# Patient Record
Sex: FEMALE | Race: WHITE | Employment: UNEMPLOYED | URBAN - METROPOLITAN AREA
[De-identification: names, ages, dates, MRNs, and addresses within clinical notes are randomized per-mention and may not be internally consistent; named-entity substitution may affect disease eponyms.]

---

## 2017-01-03 ENCOUNTER — HOSPITAL ENCOUNTER (OUTPATIENT)
Dept: PERINATAL CARE | Age: 22
Discharge: HOME | End: 2017-01-03
Attending: OBSTETRICS & GYNECOLOGY
Payer: MEDICAID

## 2017-01-03 PROCEDURE — 76816 OB US FOLLOW-UP PER FETUS: CPT | Performed by: OBSTETRICS & GYNECOLOGY

## 2017-01-10 ENCOUNTER — OFFICE VISIT (OUTPATIENT)
Dept: ENDOCRINOLOGY | Age: 22
End: 2017-01-10

## 2017-01-10 VITALS
OXYGEN SATURATION: 99 % | SYSTOLIC BLOOD PRESSURE: 104 MMHG | BODY MASS INDEX: 41.82 KG/M2 | WEIGHT: 213 LBS | TEMPERATURE: 97 F | HEIGHT: 60 IN | RESPIRATION RATE: 16 BRPM | DIASTOLIC BLOOD PRESSURE: 59 MMHG | HEART RATE: 108 BPM

## 2017-01-10 DIAGNOSIS — E10.65 HYPERGLYCEMIA DUE TO TYPE 1 DIABETES MELLITUS (HCC): ICD-10-CM

## 2017-01-10 DIAGNOSIS — Z79.4 ENCOUNTER FOR LONG-TERM (CURRENT) USE OF INSULIN (HCC): ICD-10-CM

## 2017-01-10 DIAGNOSIS — Z46.81 INSULIN PUMP FITTING OR ADJUSTMENT: Primary | ICD-10-CM

## 2017-01-10 DIAGNOSIS — O24.012 PRE-EXISTING TYPE 1 DIABETES MELLITUS DURING PREGNANCY IN SECOND TRIMESTER: ICD-10-CM

## 2017-01-10 NOTE — PROGRESS NOTES
[facility]               Anastacia Ponce MD        [address] 78 444 81 66 Fax [phone]           Patient Information  Date:1/10/2017  Name : Lyle Esteban 24 y.o.     YOB: 1995         Referred by: None     OB Dr Yoselyn LozanoAr[place], [place] [phone]    Chief Complaint   Patient presents with    Gestational Diabetes       History of Present Illness: Lyle Esteban is a 24 y.o. female here for follow up       Reviewed the pump download and CGM download  She reports hypoglycemia and is eating more. I reviewed the pump download as well as CGM data. She had once in the last 10 days and then in December - both of which she said she did not have food at home and did not eat much. Her fasting sugars are at reasonable goal but during the daytime blood glucose is increasing. Diet not healthy  - cannot afford healthy food as she has to feed her brother and boyfriend , brother is autistic - working with  now    She did meet with dietician     She also has DEXCOM (continues glucose monitor). She was diagnosed with type 1 diabetes mellitus at age 10. She was seen by Dr. Kyara Vickers, pediatric endocrinologist . Suki Baron took her baby  and she is here with Diabetic Service Dog. She has seen several endocrinologists . 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 Before she had the Diabetic Service Dog, she had multiple hospitalizations.               Wt Readings from Last 3 Encounters:   01/10/17 213 lb (96.6 kg)   12/05/16 204 lb (92.5 kg)   11/04/16 198 lb (89.8 kg)       BP Readings from Last 3 Encounters:   01/10/17 104/59   12/05/16 104/66   11/04/16 101/58           Past Medical History   Diagnosis Date    Asperger's syndrome     Asthma     B[facility]     Depression     Diabetes ([facility]     E-coli UTI     Endocrine disease      diabetes    FHx: allergies  Manic depression ([facility]     Vision decreased      Current Outpatient Prescriptions   Medication Sig    GLUCAGON EMERGENCY KIT, HUMAN, 1 mg injection INJECT 1 ML INTRAMUSCULARLY ONCE FOR 1 DOSE.  CHILD ASPIRIN 81 mg chewable tablet CHEW ONE TABLET BY MOUTH EVERY DAY FOR 30 DAYS    PROMETHAZINE HCL (PHENERGAN PO) Take  by mouth as needed.  folic acid (FOLVITE) 1 mg tablet Take  by mouth four (4) times daily.  glucose blood VI test strips (CONTOUR NEXT STRIPS) strip Test blood glucose 6x daily Dx Code: O24.414    Lancets misc Test blood glucose 6x daily Dx Code: O24.414    insulin aspart (NOVOLOG) 100 unit/mL injection Use with insulin pump Max units daily: 100    busPIRone (BUSPAR) 10 mg tablet Take 10 mg by mouth two (2) times a day.  Blood-Glucose Meter (CONTOUR NEXT METER) misc Test blood glucose 4x daily    insulin syringe-needle U-100 (BD INSULIN SYRINGE ULTRA-FINE) 1/2 mL 31 x 15/64\" syrg 1 Syringe by SubCUTAneous route Before breakfast, lunch, dinner and at bedtime.  omeprazole (PRILOSEC) 20 mg capsule Take 10 mg by mouth daily. Not sure of dose    loratadine (CLARITIN) 10 mg tablet Take 10 mg by mouth daily.  albuterol (PROAIR HFA) 90 mcg/Actuation inhaler Take 1 Puff by inhalation every four (4) hours as needed.  THALIA 250 mg/mL (1 mL) oil every seven (7) days.  NOVOLOG 100 unit/mL injection INJECT 8-15 UNITS BEFORE EACH MEAL W/ SSI    traZODone (DESYREL) 50 mg tablet Take 25 mg by mouth as needed for Sleep.  insulin glargine (LANTUS) 100 unit/mL injection Inject 30 units in AM    diclofenac potassium (CATAFLAM) 50 mg tablet Take 50 mg by mouth two (2) times a day.  methocarbamol (ROBAXIN) 500 mg tablet Take 500 mg by mouth every six (6) hours as needed.  MEDROXYPROGESTERONE ACETATE (DEPO-PROVERA IM) by IntraMUSCular route.  tizanidine (ZANAFLEX) 2 mg tablet Take 1 tablet by mouth three (3) times daily as needed.     sertraline (ZOLOFT) 100 mg tablet Take 1 Tab by mouth daily. Indications: DEPRESSION    pseudoephedrine (SUDAFED) 30 mg tablet Take 60 mg by mouth every morning.  DESOGESTREL-ETHINYL ESTRADIOL (APRI PO) Take  by mouth. No current facility-administered medications for this visit. Allergies   Allergen Reactions    Latex Rash    Other Food Itching     Califlower    Banana Itching and Nausea and Vomiting    Benadryl [Diphenhydramine Hcl] Rash    Children's Tylenol [Acetaminophen] Rash    Honey Itching     Itchy tounge    Kiwi Itching and Nausea and Vomiting    Lactose Nausea and Vomiting     Only milk is the issue    Peach (Prunus Persica) Itching and Nausea and Vomiting     Allergic reactions to raw peaches    Pineapple Itching and Nausea and Vomiting    Strawberry Itching and Nausea and Vomiting     Allergies to raw strawberries         Review of Systems:  -   - Eyes: no blurry vision no double vision  - Cardiovascular: no chest pain ,no palpitations  - Respiratory: no cough no shortness of breath  - Gastrointestinal: no dysphagia no  abdominal pain  - Musculoskeletal: no joint pains no  weakness  - Integumentary: no rashes  -   -     Physical Examination:   Blood pressure 104/59, pulse (!) 108, temperature 97 °F (36.1 °C), temperature source Oral, resp. rate 16, height 5' (1.524 m), weight 213 lb (96.6 kg), SpO2 99 %. Estimated body mass index is 41.6 kg/(m^2) as calculated from the following:    Height as of this encounter: 5' (1.524 m). -   Weight as of this encounter: 213 lb (96.6 kg).   - General: pleasant, no distress, good eye contact  - HEENT: no pallor, no periorbital edema, EOMI  - Neck: supple, no thyromegaly,   - Cardiovascular: regular, normal rate, normal S1 and S2  - Respiratory: clear to auscultation bilaterally  - Gastrointestinal: soft, gravid abdomen  - Musculoskeletal:  no edema  - Neurological: alert and oriented  - Psychiatric: normal mood and affect  - Skin: color, texture, turgor normal.       Data Reviewed: [] Glucose records reviewed. [] See glucose records for details (to be scanned). [] A1C  [] Reviewed labs    Lab Results   Component Value Date/Time    Hemoglobin A1c 9.1 05/03/2016 03:52 PM    Hemoglobin A1c CANCELED 01/06/2016 12:06 PM    Hemoglobin A1c 8.8 07/27/2015 12:00 AM    Glucose 160 05/03/2016 03:52 PM    Glucose 290 12/15/2013 06:00 AM    Glucose (POC) 215 01/05/2014 07:30 PM    Glucose  12/05/2016 03:10 PM    Microalb/Creat ratio (ug/mg creat.) 2.9 05/03/2016 03:52 PM    LDL,Direct 129 01/05/2015 02:18 PM    LDL, calculated 81 05/03/2016 03:52 PM    Creatinine (POC) 0.8 11/04/2014 03:12 PM    Creatinine 0.59 05/03/2016 03:52 PM      Lab Results   Component Value Date/Time    Cholesterol, total 169 05/03/2016 03:52 PM    HDL Cholesterol 74 05/03/2016 03:52 PM    LDL,Direct 129 01/05/2015 02:18 PM    LDL, calculated 81 05/03/2016 03:52 PM    Triglyceride 68 05/03/2016 03:52 PM    CHOL/HDL Ratio 2.6 12/15/2013 06:00 AM     Lab Results   Component Value Date/Time    ALT 12 05/03/2016 03:52 PM    AST 14 05/03/2016 03:52 PM    Alk. phosphatase 101 05/03/2016 03:52 PM    Bilirubin, total 0.3 05/03/2016 03:52 PM     Lab Results   Component Value Date/Time    GFR est  05/03/2016 03:52 PM    GFR est non- 05/03/2016 03:52 PM    Creatinine (POC) 0.8 11/04/2014 03:12 PM    Creatinine 0.59 05/03/2016 03:52 PM    BUN 12 05/03/2016 03:52 PM    Sodium 139 05/03/2016 03:52 PM    Potassium 4.4 05/03/2016 03:52 PM    Chloride 99 05/03/2016 03:52 PM    CO2 23 05/03/2016 03:52 PM      Lab Results   Component Value Date/Time    TSH 0.642 05/03/2016 03:52 PM    Triiodothyronine (T3), free 2.3 04/02/2013 12:00 AM    T4, Free 1.33 01/28/2014 03:46 PM        Assessment/Plan:     No diagnosis found.     1. Type 1 Diabetes Mellitus ,Uncontrolled  Lab Results   Component Value Date/Time    Hemoglobin A1c 9.1 05/03/2016 03:52 PM    Hemoglobin A1c (POC) 6.7 12/05/2016 03:10 PM        On insulin pump   CGM data reviewed   Increased basal rate at 8 AM , CHO changed   Need to input correct carbs   Eye exam in DEcember  Control is difficult and challenging   Advised to check glucose 6 times daily  FLU annually ,Pneumovax ,aspirin daily,annual eye exam,microalbumin    2 Obesity:Body mass index is 41.6 kg/(m^2). She seems to be on a high carbohydrate diet due to family situation ,[facility] and carb counting will definitely help her limit.     3 depression - stable    > 50 % time spent counseling     She gets Progesterone injections - repost BG increases then for 24 hrs - showed how to use Temp basal         There are no Patient Instructions on file for this visit. Follow-up Disposition: Not on File    Thank you for allowing me to participate in the care of this patient.     Char Burk MD

## 2017-01-10 NOTE — MR AVS SNAPSHOT
Visit Information Date & Time Provider Department Dept. Phone Encounter #  
 1/10/2017  3:30 PM Derek Clay MD [facility] Diabetes & Endocrinology [phone] [id number] Follow-up Instructions Return in about 3 weeks (around 1/31/2017). Upcoming Health Maintenance Date Due  
 FOOT EXAM Q1 5/15/2005 HPV AGE 9Y-26Y (1 of 3 - Female 3 Dose Series) 5/15/2006 Pneumococcal 19-64 Medium Risk (1 of 1 - PPSV23) 5/15/2014 DTaP/Tdap/Td series (1 - Tdap) 5/15/2016 PAP AKA CERVICAL CYTOLOGY 5/15/2016 INFLUENZA AGE 9 TO ADULT 8/1/2016 MICROALBUMIN Q1 5/3/2017 LIPID PANEL Q1 5/3/2017 HEMOGLOBIN A1C Q6M 6/5/2017 EYE EXAM RETINAL OR DILATED Q1 12/22/2017 Allergies as of 1/10/2017  Review Complete On: 1/10/2017 By: Derek Clay MD  
  
 Severity Noted Reaction Type Reactions Latex  12/20/2011    Rash Other Food  01/19/2015    Itching C[place] Roses Banana  12/20/2011   Topical Itching, Nausea and Vomiting Benadryl [Diphenhydramine Hcl]  12/20/2011    Rash Children's Tylenol [Acetaminophen]  12/20/2011    Rash Honey  12/20/2011   Topical Itching Itchy tounge Kiwi  12/20/2011   Topical Itching, Nausea and Vomiting Lactose  12/20/2011   Intolerance Nausea and Vomiting Only milk is the issue Peach (Prunus Persica)  12/20/2011   Topical Itching, Nausea and Vomiting Allergic reactions to raw peaches Pineapple  12/20/2011   Topical Itching, Nausea and Vomiting Strawberry  12/20/2011   Topical Itching, Nausea and Vomiting Allergies to raw strawberries Current Immunizations  Never Reviewed No immunizations on file. Not reviewed this visit Vitals BP Pulse Temp Resp Height(growth percentile) Weight(growth percentile) 104/59 (BP 1 Location: Left arm, BP Patient Position: Sitting) (!) 108 97 °F (36.1 °C) (Oral) 16 5' (1.524 m) 213 lb (96.6 kg) SpO2 BMI OB Status Smoking Status 99% 41.6 kg/m2 Pregnant Current Some Day Smoker Vitals History BMI and BSA Data Body Mass Index Body Surface Area  
 41.6 kg/m 2 2.02 m 2 Preferred Pharmacy Pharmacy Name Phone [facility] #[id number] Vasquez [facility] Lizeth [place] [phone] Your Updated Medication List  
  
   
This list is accurate as of: 1/10/17  5:13 PM.  Always use your most recent med list.  
  
  
  
  
 APRI PO Take  by mouth. Blood-Glucose Meter Misc Commonly known as:  CONTOUR NEXT METER Test blood glucose 4x daily  
  
 busPIRone 10 mg tablet Commonly known as:  BUSPAR Take 10 mg by mouth two (2) times a day. CHILD ASPIRIN 81 mg chewable tablet Generic drug:  aspirin CHEW ONE TABLET BY MOUTH EVERY DAY FOR 30 DAYS  
  
 DEPO-PROVERA IM  
by IntraMUSCular route. diclofenac potassium 50 mg tablet Commonly known as:  CATAFLAM  
Take 50 mg by mouth two (2) times a day. folic acid 1 mg tablet Commonly known as:  Google Take  by mouth four (4) times daily. GLUCAGON EMERGENCY KIT (HUMAN) 1 mg injection Generic drug:  glucagon INJECT 1 ML INTRAMUSCULARLY ONCE FOR 1 DOSE.  
  
 glucose blood VI test strips strip Commonly known as:  CONTOUR NEXT STRIPS Test blood glucose 6x daily Dx Code: O24.414  
  
 * insulin aspart 100 unit/mL injection Commonly known as:  Autumn Jerilyn Use with insulin pump Max units daily: 100 * NovoLOG 100 unit/mL injection Generic drug:  insulin aspart INJECT 8-15 UNITS BEFORE EACH MEAL W/ SSI  
  
 insulin glargine 100 unit/mL injection Commonly known as:  LANTUS Inject 30 units in AM  
  
 insulin syringe-needle U-100 1/2 mL 31 gauge x 15/64\" Syrg Commonly known as:  BD INSULIN SYRINGE ULTRA-FINE  
1 Syringe by SubCUTAneous route Before breakfast, lunch, dinner and at bedtime. Lancets Misc Test blood glucose 6x daily Dx Code: O24.414  
  
 loratadine 10 mg tablet Commonly known as:  Tru Draper  
 Take 10 mg by mouth daily. THALIA 250 mg/mL (1 mL) Oil Generic drug:  HYDROXYprogesterone cap(ppres)  
every seven (7) days. methocarbamol 500 mg tablet Commonly known as:  ROBAXIN Take 500 mg by mouth every six (6) hours as needed. omeprazole 20 mg capsule Commonly known as:  PRILOSEC Take 10 mg by mouth daily. Not sure of dose PHENERGAN PO Take  by mouth as needed. PROAIR HFA 90 mcg/actuation inhaler Generic drug:  albuterol Take 1 Puff by inhalation every four (4) hours as needed. sertraline 100 mg tablet Commonly known as:  ZOLOFT Take 1 Tab by mouth daily. Indications: DEPRESSION  
  
 SUDAFED 30 mg tablet Generic drug:  pseudoephedrine Take 60 mg by mouth every morning. tiZANidine 2 mg tablet Commonly known as:  Amee Reagin Take 1 tablet by mouth three (3) times daily as needed. traZODone 50 mg tablet Commonly known as:  Terence Oh Take 25 mg by mouth as needed for Sleep. * Notice: This list has 2 medication(s) that are the same as other medications prescribed for you. Read the directions carefully, and ask your doctor or other care provider to review them with you. Follow-up Instructions Return in about 3 weeks (around 1/31/2017). Introducing [facility] & HEALTH SERVICES! Dear Eulalia Dance: 
Thank you for requesting a [employer] account. Our records indicate that you already have an active [employer] account. You can access your account anytime at [email or web address] Did you know that you can access your hospital and ER discharge instructions at any time in [employer]? You can also review all of your test results from your hospital stay or ER visit. Additional Information If you have questions, please visit the Frequently Asked Questions section of the [employer] website at [email or web address] Remember, [employer] is NOT to be used for urgent needs. For medical emergencies, dial 911. Now available from your iPhone and Android! Please provide this summary of care documentation to your next provider. Your primary care clinician is listed as NONE. If you have any questions after today's visit, please call [phone].

## 2017-01-10 NOTE — Clinical Note
Please send the notes to [employer] Dr Vicki Blakely, [employer]  Office number [phone]  Add the fax number in Care providers

## 2017-01-10 NOTE — PROGRESS NOTES
Eye exam: 12/2016  Foot exam: within last year    Lab Results   Component Value Date/Time    Hemoglobin A1c 9.1 05/03/2016 03:52 PM    Hemoglobin A1c (POC) 6.7 12/05/2016 03:10 PM     Wt Readings from Last 3 Encounters:   01/10/17 213 lb (96.6 kg)   12/05/16 204 lb (92.5 kg)   11/04/16 198 lb (89.8 kg)     Temp Readings from Last 3 Encounters:   01/10/17 97 °F (36.1 °C) (Oral)   12/05/16 97.6 °F (36.4 °C) (Oral)   11/04/16 98.2 °F (36.8 °C) (Oral)     BP Readings from Last 3 Encounters:   01/10/17 104/59   12/05/16 104/66   11/04/16 101/58     Pulse Readings from Last 3 Encounters:   01/10/17 (!) 108   12/05/16 (!) 103   11/04/16 83

## 2017-02-01 ENCOUNTER — OFFICE VISIT (OUTPATIENT)
Dept: ENDOCRINOLOGY | Age: 22
End: 2017-02-01

## 2017-02-01 VITALS
DIASTOLIC BLOOD PRESSURE: 61 MMHG | BODY MASS INDEX: 42.15 KG/M2 | HEIGHT: 60 IN | HEART RATE: 114 BPM | TEMPERATURE: 97.1 F | RESPIRATION RATE: 18 BRPM | WEIGHT: 214.7 LBS | SYSTOLIC BLOOD PRESSURE: 92 MMHG

## 2017-02-01 DIAGNOSIS — Z46.81 INSULIN PUMP FITTING OR ADJUSTMENT: ICD-10-CM

## 2017-02-01 DIAGNOSIS — O24.012 PRE-EXISTING TYPE 1 DIABETES MELLITUS DURING PREGNANCY IN SECOND TRIMESTER: Primary | ICD-10-CM

## 2017-02-01 DIAGNOSIS — Z79.4 ENCOUNTER FOR LONG-TERM (CURRENT) USE OF INSULIN (HCC): ICD-10-CM

## 2017-02-01 NOTE — MR AVS SNAPSHOT
Visit Information Date & Time Provider Department Dept. Phone Encounter #  
 2/1/2017  3:30 PM Evelyn Shaw MD [facility] [phone] [id number] Follow-up Instructions Return in about 2 weeks (around 2/15/2017). Your Appointments 2/1/2017  3:30 PM  
ROUTINE CARE with Evelyn Shaw MD  
[facility]) Appt Note: 3 week fu; LM to confirm appt [address] [place] [address] [address] [facility] [address]  
[phone]  
  
   
 [address] [place] [address] Upcoming Health Maintenance Date Due  
 FOOT EXAM Q1 5/15/2005 HPV AGE 9Y-26Y (1 of 3 - Female 3 Dose Series) 5/15/2006 Pneumococcal 19-64 Medium Risk (1 of 1 - PPSV23) 5/15/2014 DTaP/Tdap/Td series (1 - Tdap) 5/15/2016 PAP AKA CERVICAL CYTOLOGY 5/15/2016 INFLUENZA AGE 9 TO ADULT 8/1/2016 MICROALBUMIN Q1 5/3/2017 LIPID PANEL Q1 5/3/2017 HEMOGLOBIN A1C Q6M 6/5/2017 EYE EXAM RETINAL OR DILATED Q1 12/22/2017 Allergies as of 2/1/2017  Review Complete On: 2/1/2017 By: Holli Page LPN Severity Noted Reaction Type Reactions Latex  12/20/2011    Rash Other Food  01/19/2015    Itching Glena Paz Banana  12/20/2011   Topical Itching, Nausea and Vomiting Benadryl [Diphenhydramine Hcl]  12/20/2011    Rash Children's Tylenol [Acetaminophen]  12/20/2011    Rash Honey  12/20/2011   Topical Itching Itchy tounge Kiwi  12/20/2011   Topical Itching, Nausea and Vomiting Lactose  12/20/2011   Intolerance Nausea and Vomiting Only milk is the issue Peach (Prunus Persica)  12/20/2011   Topical Itching, Nausea and Vomiting Allergic reactions to raw peaches Pineapple  12/20/2011   Topical Itching, Nausea and Vomiting Strawberry  12/20/2011   Topical Itching, Nausea and Vomiting Allergies to raw strawberries Current Immunizations  Never Reviewed No immunizations on file. Not reviewed this visit You Were Diagnosed With   
  
 Codes Comments Pre-existing type 1 diabetes mellitus during pregnancy in second trimester    -  Primary ICD-10-CM: O24.012 
ICD-9-CM: 648.03, 250.01 Vitals BP Pulse Temp Resp Height(growth percentile) Weight(growth percentile) 92/61 (BP 1 Location: Right arm, BP Patient Position: Sitting) (!) 114 97.1 °F (36.2 °C) (Oral) 18 5' (1.524 m) 214 lb 11.2 oz (97.4 kg) BMI OB Status Smoking Status 41.93 kg/m2 Pregnant Current Some Day Smoker BMI and BSA Data Body Mass Index Body Surface Area 41.93 kg/m 2 2.03 m 2 Preferred Pharmacy Pharmacy Name Phone [id number] [phone] Your Updated Medication List  
  
   
This list is accurate as of: 2/1/17  3:20 PM.  Always use your most recent med list.  
  
  
  
  
 APRI PO Take  by mouth. Blood-Glucose Meter Misc Commonly known as:  CONTOUR NEXT METER Test blood glucose 4x daily  
  
 busPIRone 10 mg tablet Commonly known as:  BUSPAR Take 10 mg by mouth two (2) times a day. CHILD ASPIRIN 81 mg chewable tablet Generic drug:  aspirin CHEW ONE TABLET BY MOUTH EVERY DAY FOR 30 DAYS  
  
 DEPO-PROVERA IM  
by IntraMUSCular route. diclofenac potassium 50 mg tablet Commonly known as:  CATAFLAM  
Take 50 mg by mouth two (2) times a day. folic acid 1 mg tablet Commonly known as:  Google Take  by mouth four (4) times daily. GLUCAGON EMERGENCY KIT (HUMAN) 1 mg injection Generic drug:  glucagon INJECT 1 ML INTRAMUSCULARLY ONCE FOR 1 DOSE.  
  
 glucose blood VI test strips strip Commonly known as:  CONTOUR NEXT STRIPS Test blood glucose 6x daily Dx Code: O24.414  
  
 * insulin aspart 100 unit/mL injection Commonly known as:  Chula Chavis Use with insulin pump Max units daily: 100 * NovoLOG 100 unit/mL injection Generic drug:  insulin aspart INJECT 8-15 UNITS BEFORE EACH MEAL W/ SSI  
  
 insulin glargine 100 unit/mL injection Commonly known as:  LANTUS Inject 30 units in AM  
  
 insulin syringe-needle U-100 1/2 mL 31 gauge x 15/64\" Syrg Commonly known as:  BD INSULIN SYRINGE ULTRA-FINE  
1 Syringe by SubCUTAneous route Before breakfast, lunch, dinner and at bedtime. Lancets Misc Test blood glucose 6x daily Dx Code: O24.414  
  
 loratadine 10 mg tablet Commonly known as:  Shellye Drape Take 10 mg by mouth daily. THALIA 250 mg/mL (1 mL) Oil Generic drug:  hydroxyprogest(PF)(preg presv)  
every seven (7) days. methocarbamol 500 mg tablet Commonly known as:  ROBAXIN Take 500 mg by mouth every six (6) hours as needed. omeprazole 20 mg capsule Commonly known as:  PRILOSEC Take 10 mg by mouth daily. Not sure of dose PHENERGAN PO Take  by mouth as needed. PROAIR HFA 90 mcg/actuation inhaler Generic drug:  albuterol Take 1 Puff by inhalation every four (4) hours as needed. sertraline 100 mg tablet Commonly known as:  ZOLOFT Take 1 Tab by mouth daily. Indications: DEPRESSION  
  
 SUDAFED 30 mg tablet Generic drug:  pseudoephedrine Take 60 mg by mouth every morning. tiZANidine 2 mg tablet Commonly known as:  Tootie Goldberg Take 1 tablet by mouth three (3) times daily as needed. traZODone 50 mg tablet Commonly known as:  Yannick Rios Take 25 mg by mouth as needed for Sleep. * Notice: This list has 2 medication(s) that are the same as other medications prescribed for you. Read the directions carefully, and ask your doctor or other care provider to review them with you. Follow-up Instructions Return in about 2 weeks (around 2/15/2017). To-Do List   
 02/02/2017 1:30 PM  
  Appointment with ULTRASOUND 1 [facility] at [id number] [place] [address] [phone] ([phone]) Introducing [facility] & HEALTH SERVICES! Dear Gary Frank: Thank you for requesting a [employer] account. Our records indicate that you already have an active [employer] account. You can access your account anytime at [email or web address] Did you know that you can access your hospital and ER discharge instructions at any time in [employer]? You can also review all of your test results from your hospital stay or ER visit. Additional Information If you have questions, please visit the Frequently Asked Questions section of the [employer] website at [email or web address] Remember, [employer] is NOT to be used for urgent needs. For medical emergencies, dial 911. Now available from your iPhone and Android! Please provide this summary of care documentation to your next provider. Your primary care clinician is listed as NONE. If you have any questions after today's visit, please call [phone].

## 2017-02-01 NOTE — PROGRESS NOTES
Janny Manzanares is a 24 y.o. female here for   Chief Complaint   Patient presents with    Diabetes     3 week f/u       Lab Results   Component Value Date/Time    Hemoglobin A1c 9.1 05/03/2016 03:52 PM    Hemoglobin A1c (POC) 6.7 12/05/2016 03:10 PM       Wt Readings from Last 3 Encounters:   01/10/17 213 lb (96.6 kg)   12/05/16 204 lb (92.5 kg)   11/04/16 198 lb (89.8 kg)     Temp Readings from Last 3 Encounters:   01/10/17 97 °F (36.1 °C) (Oral)   12/05/16 97.6 °F (36.4 °C) (Oral)   11/04/16 98.2 °F (36.8 °C) (Oral)     BP Readings from Last 3 Encounters:   01/10/17 104/59   12/05/16 104/66   11/04/16 101/58     Pulse Readings from Last 3 Encounters:   01/10/17 (!) 108   12/05/16 (!) 103   11/04/16 83

## 2017-02-01 NOTE — PROGRESS NOTES
[facility]               Pietro Barton MD        [address] 78 [phone] Fax [phone]           Patient Information  Date:2/1/2017  Name : Marylene Binning 24 y.o.     YOB: 1995         Referred by: None     OB Dr Chipper Dakins, [place] [phone]    Chief Complaint   Patient presents with    Diabetes     3 week f/u       History of Present Illness: Marylene Binning is a 24 y.o. female here for follow up       Reviewed the pump download and CGM download    Her fasting sugars are at reasonable goal but during the daytime blood glucose is higher than normal   Depends on food stamps       Diet not healthy  - cannot afford healthy food as she has to feed her brother and boyfriend , brother is autistic      She was diagnosed with type 1 diabetes mellitus at age 10. She was seen by Dr. Elgin Fountain, pediatric endocrinologist . Bjorn Alfredo took her baby  and she is here with Diabetic Service Dog. She has seen several endocrinologists . 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 Before she had the Diabetic Service Dog, she had multiple hospitalizations. Wt Readings from Last 3 Encounters:   02/01/17 214 lb 11.2 oz (97.4 kg)   01/10/17 213 lb (96.6 kg)   12/05/16 204 lb (92.5 kg)       BP Readings from Last 3 Encounters:   02/01/17 92/61   01/10/17 104/59   12/05/16 104/66           Past Medical History   Diagnosis Date    Asperger's syndrome     Asthma     [facility] disorder ([facility] [address] [facility]     Depression     Diabetes ([facility] [address] [facility]     E-coli UTI     Endocrine disease      diabetes    FHx: allergies     Manic depression (HCC)     Vision decreased      Current Outpatient Prescriptions   Medication Sig    THALIA 250 mg/mL (1 mL) oil every seven (7) days.     GLUCAGON EMERGENCY KIT, HUMAN, 1 mg injection INJECT 1 ML INTRAMUSCULARLY ONCE FOR 1 DOSE.    NOVOLOG 100 unit/mL injection INJECT 8-15 UNITS BEFORE EACH MEAL W/ SSI    CHILD ASPIRIN 81 mg chewable tablet CHEW ONE TABLET BY MOUTH EVERY DAY FOR 30 DAYS    PROMETHAZINE HCL (PHENERGAN PO) Take  by mouth as needed.  folic acid (FOLVITE) 1 mg tablet Take  by mouth four (4) times daily.  glucose blood VI test strips (CONTOUR NEXT STRIPS) strip Test blood glucose 6x daily Dx Code: O24.414    Lancets misc Test blood glucose 6x daily Dx Code: O24.414    insulin aspart (NOVOLOG) 100 unit/mL injection Use with insulin pump Max units daily: 100    busPIRone (BUSPAR) 10 mg tablet Take 10 mg by mouth two (2) times a day.  Blood-Glucose Meter (CONTOUR NEXT METER) misc Test blood glucose 4x daily    insulin syringe-needle U-100 (BD INSULIN SYRINGE ULTRA-FINE) 1/2 mL 31 x 15/64\" syrg 1 Syringe by SubCUTAneous route Before breakfast, lunch, dinner and at bedtime.  traZODone (DESYREL) 50 mg tablet Take 25 mg by mouth as needed for Sleep.  sertraline (ZOLOFT) 100 mg tablet Take 1 Tab by mouth daily. Indications: DEPRESSION    omeprazole (PRILOSEC) 20 mg capsule Take 10 mg by mouth daily. Not sure of dose    loratadine (CLARITIN) 10 mg tablet Take 10 mg by mouth daily.  albuterol (PROAIR HFA) 90 mcg/Actuation inhaler Take 1 Puff by inhalation every four (4) hours as needed.  insulin glargine (LANTUS) 100 unit/mL injection Inject 30 units in AM    diclofenac potassium (CATAFLAM) 50 mg tablet Take 50 mg by mouth two (2) times a day.  methocarbamol (ROBAXIN) 500 mg tablet Take 500 mg by mouth every six (6) hours as needed.  MEDROXYPROGESTERONE ACETATE (DEPO-PROVERA IM) by IntraMUSCular route.  tizanidine (ZANAFLEX) 2 mg tablet Take 1 tablet by mouth three (3) times daily as needed.  pseudoephedrine (SUDAFED) 30 mg tablet Take 60 mg by mouth every morning.  DESOGESTREL-ETHINYL ESTRADIOL (APRI PO) Take  by mouth. No current facility-administered medications for this visit.       Allergies   Allergen Reactions    Latex Rash    Other Food Itching     Califlower    Banana Itching and Nausea and Vomiting    Benadryl [Diphenhydramine Hcl] Rash    Children's Tylenol [Acetaminophen] Rash    Honey Itching     Itchy tounge    Kiwi Itching and Nausea and Vomiting    Lactose Nausea and Vomiting     Only milk is the issue    Peach (Prunus Persica) Itching and Nausea and Vomiting     Allergic reactions to raw peaches    Pineapple Itching and Nausea and Vomiting    Strawberry Itching and Nausea and Vomiting     Allergies to raw strawberries         Review of Systems:  -   - Eyes: no blurry vision no double vision  - Cardiovascular: no chest pain ,no palpitations  - Respiratory: no cough no shortness of breath  - Gastrointestinal: no dysphagia no  abdominal pain  - Musculoskeletal: no joint pains no  weakness  - Integumentary: no rashes  -   -     Physical Examination:   Blood pressure 92/61, pulse (!) 114, temperature 97.1 °F (36.2 °C), temperature source Oral, resp. rate 18, height 5' (1.524 m), weight 214 lb 11.2 oz (97.4 kg). Estimated body mass index is 41.93 kg/(m^2) as calculated from the following:    Height as of this encounter: 5' (1.524 m). -   Weight as of this encounter: 214 lb 11.2 oz (97.4 kg). - General: pleasant, no distress, good eye contact  - HEENT: no pallor, no periorbital edema, EOMI  - Neck: supple, no thyromegaly,   - Cardiovascular: regular, normal rate, normal S1 and S2  - Respiratory: clear to auscultation bilaterally  - Gastrointestinal: soft, gravid abdomen  - Musculoskeletal:  no edema  - Neurological: alert and oriented  - Psychiatric: normal mood and affect  - Skin: color, texture, turgor normal.       Data Reviewed:     [] Glucose records reviewed. [] See glucose records for details (to be scanned).   [] A1C  [] Reviewed labs    Lab Results   Component Value Date/Time    Hemoglobin A1c 9.1 05/03/2016 03:52 PM    Hemoglobin A1c CANCELED 01/06/2016 12:06 PM    Hemoglobin A1c 8.8 07/27/2015 12:00 AM    Glucose 160 05/03/2016 03:52 PM    Glucose 290 12/15/2013 06:00 AM    Glucose (POC) 215 01/05/2014 07:30 PM    Glucose  12/05/2016 03:10 PM    Microalb/Creat ratio (ug/mg creat.) 2.9 05/03/2016 03:52 PM    LDL,Direct 129 01/05/2015 02:18 PM    LDL, calculated 81 05/03/2016 03:52 PM    Creatinine (POC) 0.8 11/04/2014 03:12 PM    Creatinine 0.59 05/03/2016 03:52 PM      Lab Results   Component Value Date/Time    Cholesterol, total 169 05/03/2016 03:52 PM    HDL Cholesterol 74 05/03/2016 03:52 PM    LDL,Direct 129 01/05/2015 02:18 PM    LDL, calculated 81 05/03/2016 03:52 PM    Triglyceride 68 05/03/2016 03:52 PM    CHOL/HDL Ratio 2.6 12/15/2013 06:00 AM     Lab Results   Component Value Date/Time    ALT (SGPT) 12 05/03/2016 03:52 PM    AST (SGOT) 14 05/03/2016 03:52 PM    Alk. phosphatase 101 05/03/2016 03:52 PM    Bilirubin, total 0.3 05/03/2016 03:52 PM     Lab Results   Component Value Date/Time    GFR est  05/03/2016 03:52 PM    GFR est non- 05/03/2016 03:52 PM    Creatinine (POC) 0.8 11/04/2014 03:12 PM    Creatinine 0.59 05/03/2016 03:52 PM    BUN 12 05/03/2016 03:52 PM    Sodium 139 05/03/2016 03:52 PM    Potassium 4.4 05/03/2016 03:52 PM    Chloride 99 05/03/2016 03:52 PM    CO2 23 05/03/2016 03:52 PM      Lab Results   Component Value Date/Time    TSH 0.642 05/03/2016 03:52 PM    Triiodothyronine (T3), free 2.3 04/02/2013 12:00 AM    T4, Free 1.33 01/28/2014 03:46 PM        Assessment/Plan:     1. Pre-existing type 1 diabetes mellitus during pregnancy in second trimester        1.  Type 1 Diabetes Mellitus ,Uncontrolled  Lab Results   Component Value Date/Time    Hemoglobin A1c 9.1 05/03/2016 03:52 PM    Hemoglobin A1c (POC) 6.7 12/05/2016 03:10 PM    28 weeks      On insulin pump   CGM data reviewed   Increased basal rate at 8 AM , CHO changed   Need to input correct carbs   Eye exam in DEcember  Control is difficult and challenging   Advised to check glucose 6 times daily  FLU annually ,Pneumovax ,aspirin daily,annual eye exam,microalbumin    2 Obesity:Body mass index is 41.93 kg/(m^2). She seems to be on a high carbohydrate diet due to family situation ,getting help from [facility] and carb counting will definitely help her limit.     3 depression - stable    > 50 % time spent counseling     She gets Progesterone injections - repost BG increases then for 24 hrs - showed how to use Temp basal         There are no Patient Instructions on file for this visit. Follow-up Disposition: Not on File    Thank you for allowing me to participate in the care of this patient.     Renie Hatchet, MD

## 2017-02-02 ENCOUNTER — HOSPITAL ENCOUNTER (OUTPATIENT)
Dept: PERINATAL CARE | Age: 22
Discharge: HOME | End: 2017-02-02
Payer: MEDICAID

## 2017-02-02 LAB — TSH SERPL DL<=0.005 MIU/L-ACNC: 0.42 UIU/ML (ref 0.45–4.5)

## 2017-02-02 PROCEDURE — 76816 OB US FOLLOW-UP PER FETUS: CPT | Performed by: OBSTETRICS & GYNECOLOGY

## 2017-03-07 ENCOUNTER — OFFICE VISIT (OUTPATIENT)
Dept: ENDOCRINOLOGY | Age: 22
End: 2017-03-07

## 2017-03-07 ENCOUNTER — HOSPITAL ENCOUNTER (OUTPATIENT)
Dept: PERINATAL CARE | Age: 22
Discharge: HOME | End: 2017-03-07
Admitting: OBSTETRICS & GYNECOLOGY
Payer: MEDICAID

## 2017-03-07 VITALS
HEIGHT: 60 IN | TEMPERATURE: 96.4 F | BODY MASS INDEX: 44.92 KG/M2 | DIASTOLIC BLOOD PRESSURE: 72 MMHG | HEART RATE: 97 BPM | WEIGHT: 228.8 LBS | RESPIRATION RATE: 18 BRPM | SYSTOLIC BLOOD PRESSURE: 116 MMHG

## 2017-03-07 DIAGNOSIS — O24.313 PREEXISTING DIABETES COMPLICATING PREGNANCY IN THIRD TRIMESTER, ANTEPARTUM: Primary | ICD-10-CM

## 2017-03-07 DIAGNOSIS — Z46.81 INSULIN PUMP FITTING OR ADJUSTMENT: ICD-10-CM

## 2017-03-07 DIAGNOSIS — E10.65 HYPERGLYCEMIA DUE TO TYPE 1 DIABETES MELLITUS (HCC): ICD-10-CM

## 2017-03-07 PROCEDURE — 76818 FETAL BIOPHYS PROFILE W/NST: CPT | Performed by: OBSTETRICS & GYNECOLOGY

## 2017-03-07 PROCEDURE — 76816 OB US FOLLOW-UP PER FETUS: CPT | Performed by: OBSTETRICS & GYNECOLOGY

## 2017-03-07 NOTE — PROGRESS NOTES
Destiny Egan is a 24 y.o. female here for   Chief Complaint   Patient presents with    Diabetes     3/7/17 - A1C 6.7%    Functional glucose monitor and record keeping system? - yes    Lab Results   Component Value Date/Time    Hemoglobin A1c 9.1 05/03/2016 03:52 PM    Hemoglobin A1c (POC) 6.7 12/05/2016 03:10 PM       Wt Readings from Last 3 Encounters:   02/01/17 214 lb 11.2 oz (97.4 kg)   01/10/17 213 lb (96.6 kg)   12/05/16 204 lb (92.5 kg)     Temp Readings from Last 3 Encounters:   02/01/17 97.1 °F (36.2 °C) (Oral)   01/10/17 97 °F (36.1 °C) (Oral)   12/05/16 97.6 °F (36.4 °C) (Oral)     BP Readings from Last 3 Encounters:   02/01/17 92/61   01/10/17 104/59   12/05/16 104/66     Pulse Readings from Last 3 Encounters:   02/01/17 (!) 114   01/10/17 (!) 108   12/05/16 (!) 103

## 2017-03-07 NOTE — MR AVS SNAPSHOT
Visit Information Date & Time Provider Department Dept. Phone Encounter #  
 3/7/2017 11:15 AM Alona Chang MD [facility] [phone] [id number] Follow-up Instructions Return in about 3 weeks (around 3/28/2017). Upcoming Health Maintenance Date Due  
 FOOT EXAM Q1 5/15/2005 HPV AGE 9Y-26Y (1 of 3 - Female 3 Dose Series) 5/15/2006 Pneumococcal 19-64 Medium Risk (1 of 1 - PPSV23) 5/15/2014 DTaP/Tdap/Td series (1 - Tdap) 5/15/2016 PAP AKA CERVICAL CYTOLOGY 5/15/2016 INFLUENZA AGE 9 TO ADULT 8/1/2016 MICROALBUMIN Q1 5/3/2017 LIPID PANEL Q1 5/3/2017 HEMOGLOBIN A1C Q6M 6/5/2017 EYE EXAM RETINAL OR DILATED Q1 12/22/2017 Allergies as of 3/7/2017  Review Complete On: 3/7/2017 By: Kalpesh Wahl LPN Severity Noted Reaction Type Reactions Latex  12/20/2011    Rash Other Food  01/19/2015    Itching [place] Frizzle Banana  12/20/2011   Topical Itching, Nausea and Vomiting Benadryl [Diphenhydramine Hcl]  12/20/2011    Rash Children's Tylenol [Acetaminophen]  12/20/2011    Rash Honey  12/20/2011   Topical Itching Itchy tounge Kiwi  12/20/2011   Topical Itching, Nausea and Vomiting Lactose  12/20/2011   Intolerance Nausea and Vomiting Only milk is the issue Peach (Prunus Persica)  12/20/2011   Topical Itching, Nausea and Vomiting Allergic reactions to raw peaches Pineapple  12/20/2011   Topical Itching, Nausea and Vomiting Strawberry  12/20/2011   Topical Itching, Nausea and Vomiting Allergies to raw strawberries Current Immunizations  Never Reviewed No immunizations on file. Not reviewed this visit Vitals BP Pulse Temp Resp Height(growth percentile) Weight(growth percentile) 116/72 (BP 1 Location: Right arm, BP Patient Position: Sitting) 97 96.4 °F (35.8 °C) (Oral) 18 5' (1.524 m) 228 lb 12.8 oz (103.8 kg) BMI OB Status Smoking Status 44.68 kg/m2 Pregnant Current Some Day Smoker BMI and BSA Data Body Mass Index Body Surface Area 44.68 kg/m 2 2.1 m 2 Preferred Pharmacy Pharmacy Name Phone [id number] [phone] Your Updated Medication List  
  
   
This list is accurate as of: 3/7/17 12:03 PM.  Always use your most recent med list.  
  
  
  
  
 APRI PO Take  by mouth. Blood-Glucose Meter Misc Commonly known as:  CONTOUR NEXT METER Test blood glucose 4x daily  
  
 busPIRone 10 mg tablet Commonly known as:  BUSPAR Take 10 mg by mouth two (2) times a day. CHILD ASPIRIN 81 mg chewable tablet Generic drug:  aspirin CHEW ONE TABLET BY MOUTH EVERY DAY FOR 30 DAYS  
  
 DEPO-PROVERA IM  
by IntraMUSCular route. diclofenac potassium 50 mg tablet Commonly known as:  CATAFLAM  
Take 50 mg by mouth two (2) times a day. folic acid 1 mg tablet Commonly known as:  Google Take  by mouth four (4) times daily. GLUCAGON EMERGENCY KIT (HUMAN) 1 mg injection Generic drug:  glucagon INJECT 1 ML INTRAMUSCULARLY ONCE FOR 1 DOSE.  
  
 glucose blood VI test strips strip Commonly known as:  CONTOUR NEXT STRIPS Test blood glucose 6x daily Dx Code: O24.414  
  
 * insulin aspart 100 unit/mL injection Commonly known as:  Kwaku Minium Use with insulin pump Max units daily: 100 * NovoLOG 100 unit/mL injection Generic drug:  insulin aspart INJECT 8-15 UNITS BEFORE EACH MEAL W/ SSI  
  
 insulin glargine 100 unit/mL injection Commonly known as:  LANTUS Inject 30 units in AM  
  
 insulin syringe-needle U-100 1/2 mL 31 gauge x 15/64\" Syrg Commonly known as:  BD INSULIN SYRINGE ULTRA-FINE  
1 Syringe by SubCUTAneous route Before breakfast, lunch, dinner and at bedtime. Lancets Misc Test blood glucose 6x daily Dx Code: O24.414  
  
 loratadine 10 mg tablet Commonly known as:  [place]  
 Take 10 mg by mouth daily. THALIA 250 mg/mL (1 mL) Oil Generic drug:  hydroxyprogest(PF)(preg presv)  
every seven (7) days. methocarbamol 500 mg tablet Commonly known as:  ROBAXIN Take 500 mg by mouth every six (6) hours as needed. omeprazole 20 mg capsule Commonly known as:  PRILOSEC Take 10 mg by mouth daily. Not sure of dose PHENERGAN PO Take  by mouth as needed. PROAIR HFA 90 mcg/actuation inhaler Generic drug:  albuterol Take 1 Puff by inhalation every four (4) hours as needed. sertraline 100 mg tablet Commonly known as:  ZOLOFT Take 1 Tab by mouth daily. Indications: DEPRESSION  
  
 tiZANidine 2 mg tablet Commonly known as:  [place] Take 1 tablet by mouth three (3) times daily as needed. traZODone 50 mg tablet Commonly known as:  Tyshawn Handy Take 25 mg by mouth as needed for Sleep. * Notice: This list has 2 medication(s) that are the same as other medications prescribed for you. Read the directions carefully, and ask your doctor or other care provider to review them with you. Follow-up Instructions Return in about 3 weeks (around 3/28/2017). To-Do List   
 03/07/2017 1:30 PM  
  Appointment with ULTRASOUND 1 MOB [address] [place] [address] at [address] [place] [address] Pob [phone] ([phone]) Introducing [facility]! Dear Darryle Ingle: 
Thank you for requesting a [employer] account. Our records indicate that you already have an active [employer] account. You can access your account anytime at [email or web address] Did you know that you can access your hospital and ER discharge instructions at any time in [employer]? You can also review all of your test results from your hospital stay or ER visit. Additional Information If you have questions, please visit the Frequently Asked Questions section of the [employer] website at [email or web address] Remember, [employer] is NOT to be used for urgent needs.  For medical emergencies, dial 911. Now available from your iPhone and Android! Please provide this summary of care documentation to your next provider. Your primary care clinician is listed as NONE. If you have any questions after today's visit, please call [phone].

## 2017-03-08 NOTE — PROGRESS NOTES
[facility]               Kelby Conroy MD        [address] 78 [phone] Fax [phone]           Patient Information  Date:3/7/2017  Name : Ginger Medrano 24 y.o.     YOB: 1995         Referred by: None     OB Dr Inocente Crespo, [place] [phone]    Chief Complaint   Patient presents with    Diabetes       History of Present Illness: Ginger Medrano is a 24 y.o. female here for follow up       Reviewed the pump download and CGM download    Her fasting sugars are increasing , post dinner BG is high -  Depends on food stamps , diet not healthy     Cannot afford healthy food as she has to feed her brother and boyfriend , brother is autistic      She was diagnosed with type 1 diabetes mellitus at age 10. Prior hx    She was seen by Dr. Geo Davis, pediatric endocrinologist . Choco Jean took her baby  and she is here with Diabetic Service Dog. She has seen several endocrinologists . 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 Before she had the Diabetic Service Dog, she had multiple hospitalizations. Wt Readings from Last 3 Encounters:   03/07/17 228 lb 12.8 oz (103.8 kg)   02/01/17 214 lb 11.2 oz (97.4 kg)   01/10/17 213 lb (96.6 kg)       BP Readings from Last 3 Encounters:   03/07/17 116/72   02/01/17 92/61   01/10/17 104/59           Past Medical History:   Diagnosis Date    Asperger's syndrome     Asthma     Bipolar 1 disorder (HCC)     Depression     Diabetes ([facility] [address]     E-coli UTI     Endocrine disease     diabetes    FHx: allergies     Manic depression (HCC)     Vision decreased      Current Outpatient Prescriptions   Medication Sig    THALIA 250 mg/mL (1 mL) oil every seven (7) days.     GLUCAGON EMERGENCY KIT, HUMAN, 1 mg injection INJECT 1 ML INTRAMUSCULARLY ONCE FOR 1 DOSE.    NOVOLOG 100 unit/mL injection INJECT 8-15 UNITS BEFORE EACH MEAL W/ SSI    CHILD ASPIRIN 81 mg chewable tablet CHEW ONE TABLET BY MOUTH EVERY DAY FOR 30 DAYS    PROMETHAZINE HCL (PHENERGAN PO) Take  by mouth as needed.  folic acid (FOLVITE) 1 mg tablet Take  by mouth four (4) times daily.  glucose blood VI test strips (CONTOUR NEXT STRIPS) strip Test blood glucose 6x daily Dx Code: O24.414    Lancets misc Test blood glucose 6x daily Dx Code: O24.414    insulin aspart (NOVOLOG) 100 unit/mL injection Use with insulin pump Max units daily: 100    busPIRone (BUSPAR) 10 mg tablet Take 10 mg by mouth two (2) times a day.  Blood-Glucose Meter (CONTOUR NEXT METER) misc Test blood glucose 4x daily    insulin syringe-needle U-100 (BD INSULIN SYRINGE ULTRA-FINE) 1/2 mL 31 x 15/64\" syrg 1 Syringe by SubCUTAneous route Before breakfast, lunch, dinner and at bedtime.  traZODone (DESYREL) 50 mg tablet Take 25 mg by mouth as needed for Sleep.  sertraline (ZOLOFT) 100 mg tablet Take 1 Tab by mouth daily. Indications: DEPRESSION    omeprazole (PRILOSEC) 20 mg capsule Take 10 mg by mouth daily. Not sure of dose    loratadine (CLARITIN) 10 mg tablet Take 10 mg by mouth daily.  albuterol (PROAIR HFA) 90 mcg/Actuation inhaler Take 1 Puff by inhalation every four (4) hours as needed.  insulin glargine (LANTUS) 100 unit/mL injection Inject 30 units in AM    diclofenac potassium (CATAFLAM) 50 mg tablet Take 50 mg by mouth two (2) times a day.  methocarbamol (ROBAXIN) 500 mg tablet Take 500 mg by mouth every six (6) hours as needed.  MEDROXYPROGESTERONE ACETATE (DEPO-PROVERA IM) by IntraMUSCular route.  tizanidine (ZANAFLEX) 2 mg tablet Take 1 tablet by mouth three (3) times daily as needed.  DESOGESTREL-ETHINYL ESTRADIOL (APRI PO) Take  by mouth. No current facility-administered medications for this visit.       Allergies   Allergen Reactions    Latex Rash    Other Food Itching     Califlower    Banana Itching and Nausea and Vomiting    Benadryl [Diphenhydramine Hcl] Rash  Children's Tylenol [Acetaminophen] Rash    Honey Itching     Itchy tounge    Kiwi Itching and Nausea and Vomiting    Lactose Nausea and Vomiting     Only milk is the issue    Peach (Prunus Persica) Itching and Nausea and Vomiting     Allergic reactions to raw peaches    Pineapple Itching and Nausea and Vomiting    Strawberry Itching and Nausea and Vomiting     Allergies to raw strawberries         Review of Systems:  -   - Eyes: no blurry vision no double vision  - Cardiovascular: no chest pain ,no palpitations  - Respiratory: no cough no shortness of breath  - Gastrointestinal: no dysphagia no  abdominal pain  - Musculoskeletal: no joint pains no  weakness  - Integumentary: no rashes  -   -     Physical Examination:   Blood pressure 116/72, pulse 97, temperature 96.4 °F (35.8 °C), temperature source Oral, resp. rate 18, height 5' (1.524 m), weight 228 lb 12.8 oz (103.8 kg). Estimated body mass index is 44.68 kg/(m^2) as calculated from the following:    Height as of this encounter: 5' (1.524 m). -   Weight as of this encounter: 228 lb 12.8 oz (103.8 kg). - General: pleasant, no distress, good eye contact  - HEENT: no pallor, no periorbital edema, EOMI  - Neck: supple, no thyromegaly,   - Cardiovascular: regular, normal rate, normal S1 and S2  - Respiratory: clear to auscultation bilaterally  - Gastrointestinal: soft, gravid abdomen  - Musculoskeletal:  + edema  - Neurological: alert and oriented  - Psychiatric: normal mood and affect  - Skin: color, texture, turgor normal.       Data Reviewed:     [] Glucose records reviewed. [] See glucose records for details (to be scanned).   [] A1C  [] Reviewed labs    Lab Results   Component Value Date/Time    Hemoglobin A1c 9.1 05/03/2016 03:52 PM    Hemoglobin A1c CANCELED 01/06/2016 12:06 PM    Hemoglobin A1c 8.8 07/27/2015 12:00 AM    Glucose 160 05/03/2016 03:52 PM    Glucose 290 12/15/2013 06:00 AM    Glucose (POC) 215 01/05/2014 07:30 PM    Glucose  12/05/2016 03:10 PM    Microalb/Creat ratio (ug/mg creat.) 2.9 05/03/2016 03:52 PM    LDL,Direct 129 01/05/2015 02:18 PM    LDL, calculated 81 05/03/2016 03:52 PM    Creatinine (POC) 0.8 11/04/2014 03:12 PM    Creatinine 0.59 05/03/2016 03:52 PM      Lab Results   Component Value Date/Time    Cholesterol, total 169 05/03/2016 03:52 PM    HDL Cholesterol 74 05/03/2016 03:52 PM    LDL,Direct 129 01/05/2015 02:18 PM    LDL, calculated 81 05/03/2016 03:52 PM    Triglyceride 68 05/03/2016 03:52 PM    CHOL/HDL Ratio 2.6 12/15/2013 06:00 AM     Lab Results   Component Value Date/Time    ALT (SGPT) 12 05/03/2016 03:52 PM    AST (SGOT) 14 05/03/2016 03:52 PM    Alk. phosphatase 101 05/03/2016 03:52 PM    Bilirubin, total 0.3 05/03/2016 03:52 PM     Lab Results   Component Value Date/Time    GFR est  05/03/2016 03:52 PM    GFR est non- 05/03/2016 03:52 PM    Creatinine (POC) 0.8 11/04/2014 03:12 PM    Creatinine 0.59 05/03/2016 03:52 PM    BUN 12 05/03/2016 03:52 PM    Sodium 139 05/03/2016 03:52 PM    Potassium 4.4 05/03/2016 03:52 PM    Chloride 99 05/03/2016 03:52 PM    CO2 23 05/03/2016 03:52 PM      Lab Results   Component Value Date/Time    TSH 0.423 02/01/2017 03:23 PM    Triiodothyronine (T3), free 2.3 04/02/2013 12:00 AM    T4, Free 1.33 01/28/2014 03:46 PM        Assessment/Plan:     No diagnosis found. 1. Type 1 Diabetes Mellitus ,Uncontrolled  Lab Results   Component Value Date/Time    Hemoglobin A1c 9.1 05/03/2016 03:52 PM    Hemoglobin A1c (POC) 6.7 12/05/2016 03:10 PM   33 weeks      On Tandem insulin pump   CGM data reviewed   Increased basal rate at 3 AM , CHO changed   Need to input correct carbs   Eye exam in DEcember 2016  Compared to prepregnancy she is doing lot better - need to put in more effort - food choices are her main issue   Advised to check glucose 6 times daily      2 Obesity:Body mass index is 44.68 kg/(m^2).   She seems to be on a high carbohydrate diet due to family situation ,getting help from county and carb counting will definitely help her limit.     3 depression - stable    She gets Progesterone injections - BG increases then for 24 hrs - using Temp basal        There are no Patient Instructions on file for this visit. Follow-up Disposition:  Return in about 3 weeks (around 3/28/2017). Thank you for allowing me to participate in the care of this patient.     Raysa Padron MD

## 2017-05-03 ENCOUNTER — OFFICE VISIT (OUTPATIENT)
Dept: ENDOCRINOLOGY | Age: 22
End: 2017-05-03

## 2017-05-03 DIAGNOSIS — Z79.4 ENCOUNTER FOR LONG-TERM (CURRENT) USE OF INSULIN (HCC): ICD-10-CM

## 2017-05-03 DIAGNOSIS — Z46.81 INSULIN PUMP FITTING OR ADJUSTMENT: Primary | ICD-10-CM

## 2017-05-03 DIAGNOSIS — E66.9 OBESITY (BMI 30-39.9): ICD-10-CM

## 2017-05-03 DIAGNOSIS — E10.65 HYPERGLYCEMIA DUE TO TYPE 1 DIABETES MELLITUS (HCC): ICD-10-CM

## 2017-05-03 NOTE — PROGRESS NOTES
[facility]               Ting Bowman MD        [address] 78 [phone] Fax [phone]           Patient Information  Date:5/3/2017  Name : Jeferson Finley 24 y.o.     YOB: 1995         Referred by: None     OB Dr Zayda Lizarraga, [facility] [phone]    Chief Complaint   Patient presents with    Diabetes       History of Present Illness: Jeferson Finley is a 24 y.o. female here for follow up       Reviewed the pump download and CGM download  She has delivered baby 2 months ago , 9 lbs , BGis higher now late evenings and some in AM   Depends on food stamps , diet not healthy     Cannot afford healthy food as she has to feed her brother and boyfriend , brother is autistic      She was diagnosed with type 1 diabetes mellitus at age 10. Prior hx    She was seen by Dr. Aaron Torres, pediatric endocrinologist . Catalina Crespo took her baby  and she is here with Diabetic Service Dog. She has seen several endocrinologists . 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 Before she had the Diabetic Service Dog, she had multiple hospitalizations. Wt Readings from Last 3 Encounters:   03/07/17 228 lb 12.8 oz (103.8 kg)   02/01/17 214 lb 11.2 oz (97.4 kg)   01/10/17 213 lb (96.6 kg)       BP Readings from Last 3 Encounters:   03/07/17 116/72   02/01/17 92/61   01/10/17 104/59           Past Medical History:   Diagnosis Date    Asperger's syndrome     Asthma     Bipolar 1 disorder (HCC)     Depression     Diabetes ([facility] [address]     E-coli UTI     Endocrine disease     diabetes    FHx: allergies     Manic depression (HCC)     Vision decreased      Current Outpatient Prescriptions   Medication Sig    THALIA 250 mg/mL (1 mL) oil every seven (7) days.     GLUCAGON EMERGENCY KIT, HUMAN, 1 mg injection INJECT 1 ML INTRAMUSCULARLY ONCE FOR 1 DOSE.    NOVOLOG 100 unit/mL injection INJECT 8-15 UNITS BEFORE EACH MEAL W/ SSI    CHILD ASPIRIN 81 mg chewable tablet CHEW ONE TABLET BY MOUTH EVERY DAY FOR 30 DAYS    PROMETHAZINE HCL (PHENERGAN PO) Take  by mouth as needed.  folic acid (FOLVITE) 1 mg tablet Take  by mouth four (4) times daily.  glucose blood VI test strips (CONTOUR NEXT STRIPS) strip Test blood glucose 6x daily Dx Code: O24.414    Lancets misc Test blood glucose 6x daily Dx Code: O24.414    insulin aspart (NOVOLOG) 100 unit/mL injection Use with insulin pump Max units daily: 100    busPIRone (BUSPAR) 10 mg tablet Take 10 mg by mouth two (2) times a day.  Blood-Glucose Meter (CONTOUR NEXT METER) misc Test blood glucose 4x daily    insulin syringe-needle U-100 (BD INSULIN SYRINGE ULTRA-FINE) 1/2 mL 31 x 15/64\" syrg 1 Syringe by SubCUTAneous route Before breakfast, lunch, dinner and at bedtime.  traZODone (DESYREL) 50 mg tablet Take 25 mg by mouth as needed for Sleep.  insulin glargine (LANTUS) 100 unit/mL injection Inject 30 units in AM    diclofenac potassium (CATAFLAM) 50 mg tablet Take 50 mg by mouth two (2) times a day.  methocarbamol (ROBAXIN) 500 mg tablet Take 500 mg by mouth every six (6) hours as needed.  MEDROXYPROGESTERONE ACETATE (DEPO-PROVERA IM) by IntraMUSCular route.  tizanidine (ZANAFLEX) 2 mg tablet Take 1 tablet by mouth three (3) times daily as needed.  sertraline (ZOLOFT) 100 mg tablet Take 1 Tab by mouth daily. Indications: DEPRESSION    omeprazole (PRILOSEC) 20 mg capsule Take 10 mg by mouth daily. Not sure of dose    DESOGESTREL-ETHINYL ESTRADIOL (APRI PO) Take  by mouth.  loratadine (CLARITIN) 10 mg tablet Take 10 mg by mouth daily.  albuterol (PROAIR HFA) 90 mcg/Actuation inhaler Take 1 Puff by inhalation every four (4) hours as needed. No current facility-administered medications for this visit.       Allergies   Allergen Reactions    Latex Rash    Other Food Itching     Califlower    Banana Itching and Nausea and Vomiting    Benadryl [Diphenhydramine Hcl] Rash    Children's Tylenol [Acetaminophen] Rash    Honey Itching     Itchy tounge    Kiwi Itching and Nausea and Vomiting    Lactose Nausea and Vomiting     Only milk is the issue    Peach (Prunus Persica) Itching and Nausea and Vomiting     Allergic reactions to raw peaches    Pineapple Itching and Nausea and Vomiting    Strawberry Itching and Nausea and Vomiting     Allergies to raw strawberries         Review of Systems:  -   - Eyes: no blurry vision no double vision  - Cardiovascular: no chest pain ,no palpitations  - Respiratory: no cough no shortness of breath  - Gastrointestinal: no dysphagia no  abdominal pain  - Musculoskeletal: no joint pains no  weakness  - Integumentary: no rashes  -   -     Physical Examination:   There were no vitals taken for this visit. Estimated body mass index is 44.68 kg/(m^2) as calculated from the following:    Height as of 3/7/17: 5' (1.524 m). -   Weight as of 3/7/17: 228 lb 12.8 oz (103.8 kg). - General: pleasant, no distress, good eye contact  - HEENT: no pallor, no periorbital edema, EOMI  - Neck: supple, no thyromegaly,   - Cardiovascular: regular, normal rate, normal S1 and S2  - Respiratory: clear to auscultation bilaterally  - Gastrointestinal: soft, NT   - Musculoskeletal:  + edema  - Neurological: alert and oriented  - Psychiatric: normal mood and affect  - Skin: color, texture, turgor normal.       Data Reviewed:     [] Glucose records reviewed. [] See glucose records for details (to be scanned).   [] A1C  [] Reviewed labs    Lab Results   Component Value Date/Time    Hemoglobin A1c 9.1 05/03/2016 03:52 PM    Hemoglobin A1c CANCELED 01/06/2016 12:06 PM    Hemoglobin A1c 8.8 07/27/2015 12:00 AM    Glucose 160 05/03/2016 03:52 PM    Glucose 290 12/15/2013 06:00 AM    Glucose (POC) 215 01/05/2014 07:30 PM    Glucose  12/05/2016 03:10 PM    Microalb/Creat ratio (ug/mg creat.) 2.9 05/03/2016 03:52 PM    LDL,Direct 129 01/05/2015 02:18 PM    LDL, calculated 81 05/03/2016 03:52 PM    Creatinine (POC) 0.8 11/04/2014 03:12 PM    Creatinine 0.59 05/03/2016 03:52 PM      Lab Results   Component Value Date/Time    Cholesterol, total 169 05/03/2016 03:52 PM    HDL Cholesterol 74 05/03/2016 03:52 PM    LDL,Direct 129 01/05/2015 02:18 PM    LDL, calculated 81 05/03/2016 03:52 PM    Triglyceride 68 05/03/2016 03:52 PM    CHOL/HDL Ratio 2.6 12/15/2013 06:00 AM     Lab Results   Component Value Date/Time    ALT (SGPT) 12 05/03/2016 03:52 PM    AST (SGOT) 14 05/03/2016 03:52 PM    Alk. phosphatase 101 05/03/2016 03:52 PM    Bilirubin, total 0.3 05/03/2016 03:52 PM     Lab Results   Component Value Date/Time    GFR est  05/03/2016 03:52 PM    GFR est non- 05/03/2016 03:52 PM    Creatinine (POC) 0.8 11/04/2014 03:12 PM    Creatinine 0.59 05/03/2016 03:52 PM    BUN 12 05/03/2016 03:52 PM    Sodium 139 05/03/2016 03:52 PM    Potassium 4.4 05/03/2016 03:52 PM    Chloride 99 05/03/2016 03:52 PM    CO2 23 05/03/2016 03:52 PM      Lab Results   Component Value Date/Time    TSH 0.423 02/01/2017 03:23 PM    Triiodothyronine (T3), free 2.3 04/02/2013 12:00 AM    T4, Free 1.33 01/28/2014 03:46 PM        Assessment/Plan:     No diagnosis found. 1. Type 1 Diabetes Mellitus ,  Lab Results   Component Value Date/Time    Hemoglobin A1c 9.1 05/03/2016 03:52 PM    Hemoglobin A1c (POC) 6.7 12/05/2016 03:10 PM    Control has been challenging    On Tandem insulin pump   CGM data reviewed   Increased basal rate at 3 AM , CHO changed   Need to input correct carbs   Eye exam in DEcember 2016  Advised to check glucose 4 times daily      2 Obesity:There is no height or weight on file to calculate BMI. She seems to be on a high carbohydrate diet due to family situation ,getting help from [facility] and carb counting will definitely help her limit.     3 depression - stable        There are no Patient Instructions on file for this visit.   Follow-up Disposition:  Return in about 3 months (around 8/3/2017). Thank you for allowing me to participate in the care of this patient.     Nasrin Tracy MD

## 2017-05-03 NOTE — MR AVS SNAPSHOT
Visit Information Date & Time Provider Department Dept. Phone Encounter #  
 5/3/2017 10:30 AM May العلي MD [facility] [phone] [id number] Upcoming Health Maintenance Date Due  
 FOOT EXAM Q1 5/15/2005 HPV AGE 9Y-26Y (1 of 3 - Female 3 Dose Series) 5/15/2006 Pneumococcal 19-64 Medium Risk (1 of 1 - PPSV23) 5/15/2014 DTaP/Tdap/Td series (1 - Tdap) 5/15/2016 PAP AKA CERVICAL CYTOLOGY 5/15/2016 MICROALBUMIN Q1 5/3/2017 LIPID PANEL Q1 5/3/2017 HEMOGLOBIN A1C Q6M 6/5/2017 INFLUENZA AGE 9 TO ADULT 8/1/2017 EYE EXAM RETINAL OR DILATED Q1 12/22/2017 Allergies as of 5/3/2017  Review Complete On: 5/3/2017 By: May العلي MD  
  
 Severity Noted Reaction Type Reactions Latex  12/20/2011    Rash Other Food  01/19/2015    Itching Rhys Sieving Banana  12/20/2011   Topical Itching, Nausea and Vomiting Benadryl [Diphenhydramine Hcl]  12/20/2011    Rash Children's Tylenol [Acetaminophen]  12/20/2011    Rash Honey  12/20/2011   Topical Itching Itchy tounge Kiwi  12/20/2011   Topical Itching, Nausea and Vomiting Lactose  12/20/2011   Intolerance Nausea and Vomiting Only milk is the issue Peach (Prunus Persica)  12/20/2011   Topical Itching, Nausea and Vomiting Allergic reactions to raw peaches Pineapple  12/20/2011   Topical Itching, Nausea and Vomiting Strawberry  12/20/2011   Topical Itching, Nausea and Vomiting Allergies to raw strawberries Current Immunizations  Never Reviewed No immunizations on file. Not reviewed this visit Vitals OB Status Smoking Status Pregnant Current Some Day Smoker Preferred Pharmacy Pharmacy Name Phone [id number] [phone] Your Updated Medication List  
  
   
This list is accurate as of: 5/3/17 11:16 AM.  Always use your most recent med list.  
  
  
  
 APRI PO Take  by mouth. Blood-Glucose Meter Misc Commonly known as:  CONTOUR NEXT METER Test blood glucose 4x daily  
  
 busPIRone 10 mg tablet Commonly known as:  BUSPAR Take 10 mg by mouth two (2) times a day. CHILD ASPIRIN 81 mg chewable tablet Generic drug:  aspirin CHEW ONE TABLET BY MOUTH EVERY DAY FOR 30 DAYS  
  
 DEPO-PROVERA IM  
by IntraMUSCular route. diclofenac potassium 50 mg tablet Commonly known as:  CATAFLAM  
Take 50 mg by mouth two (2) times a day. folic acid 1 mg tablet Commonly known as:  Google Take  by mouth four (4) times daily. GLUCAGON EMERGENCY KIT (HUMAN) 1 mg injection Generic drug:  glucagon INJECT 1 ML INTRAMUSCULARLY ONCE FOR 1 DOSE.  
  
 glucose blood VI test strips strip Commonly known as:  CONTOUR NEXT STRIPS Test blood glucose 6x daily Dx Code: O24.414  
  
 * insulin aspart 100 unit/mL injection Commonly known as:  [place] Peoples Use with insulin pump Max units daily: 100 * NovoLOG 100 unit/mL injection Generic drug:  insulin aspart INJECT 8-15 UNITS BEFORE EACH MEAL W/ SSI  
  
 insulin glargine 100 unit/mL injection Commonly known as:  LANTUS Inject 30 units in AM  
  
 insulin syringe-needle U-100 1/2 mL 31 gauge x 15/64\" Syrg Commonly known as:  BD INSULIN SYRINGE ULTRA-FINE  
1 Syringe by SubCUTAneous route Before breakfast, lunch, dinner and at bedtime. Lancets Misc Test blood glucose 6x daily Dx Code: O24.414  
  
 loratadine 10 mg tablet Commonly known as:  Jearl Bud Take 10 mg by mouth daily. THALIA 250 mg/mL (1 mL) Oil injection Generic drug:  HYDROXYprogesterone (PF)  
every seven (7) days. methocarbamol 500 mg tablet Commonly known as:  ROBAXIN Take 500 mg by mouth every six (6) hours as needed. omeprazole 20 mg capsule Commonly known as:  PRILOSEC Take 10 mg by mouth daily. Not sure of dose PHENERGAN PO Take  by mouth as needed. PROAIR HFA 90 mcg/actuation inhaler Generic drug:  albuterol Take 1 Puff by inhalation every four (4) hours as needed. sertraline 100 mg tablet Commonly known as:  ZOLOFT Take 1 Tab by mouth daily. Indications: DEPRESSION  
  
 tiZANidine 2 mg tablet Commonly known as:  Mao Schwalbe Take 1 tablet by mouth three (3) times daily as needed. traZODone 50 mg tablet Commonly known as:  Randye Agsu Take 25 mg by mouth as needed for Sleep. * Notice: This list has 2 medication(s) that are the same as other medications prescribed for you. Read the directions carefully, and ask your doctor or other care provider to review them with you. Introducing [facility]! Dear Molli Councilman: 
Thank you for requesting a [employer] account. Our records indicate that you already have an active [employer] account. You can access your account anytime at [email or web address] Did you know that you can access your hospital and ER discharge instructions at any time in [employer]? You can also review all of your test results from your hospital stay or ER visit. Additional Information If you have questions, please visit the Frequently Asked Questions section of the [employer] website at [email or web address] Remember, [employer] is NOT to be used for urgent needs. For medical emergencies, dial 911. Now available from your iPhone and Android! Please provide this summary of care documentation to your next provider. Your primary care clinician is listed as NONE. If you have any questions after today's visit, please call [phone].

## 2017-07-20 ENCOUNTER — TELEPHONE (OUTPATIENT)
Dept: ENDOCRINOLOGY | Age: 22
End: 2017-07-20

## 2017-07-20 NOTE — TELEPHONE ENCOUNTER
Received fax for omnipod pump. Attempted to call. Unsuccessful. Left msg for Suyapa Omalley to give us a call back at the office. A callback number was left. Per Dr. Jose Maria Braxton pt and ask why is she switching to omnipod. Would want her to wait until her next visit to discuss.

## 2017-09-05 ENCOUNTER — OFFICE VISIT (OUTPATIENT)
Dept: ENDOCRINOLOGY | Age: 22
End: 2017-09-05

## 2017-09-05 VITALS
HEART RATE: 90 BPM | WEIGHT: 190.8 LBS | RESPIRATION RATE: 18 BRPM | HEIGHT: 60 IN | BODY MASS INDEX: 37.46 KG/M2 | SYSTOLIC BLOOD PRESSURE: 115 MMHG | OXYGEN SATURATION: 98 % | DIASTOLIC BLOOD PRESSURE: 75 MMHG | TEMPERATURE: 97.2 F

## 2017-09-05 DIAGNOSIS — E10.65 HYPERGLYCEMIA DUE TO TYPE 1 DIABETES MELLITUS (HCC): ICD-10-CM

## 2017-09-05 DIAGNOSIS — E10.65 HYPERGLYCEMIA DUE TO TYPE 1 DIABETES MELLITUS (HCC): Primary | ICD-10-CM

## 2017-09-05 DIAGNOSIS — Z79.4 ENCOUNTER FOR LONG-TERM (CURRENT) USE OF INSULIN (HCC): ICD-10-CM

## 2017-09-05 DIAGNOSIS — E10.65 UNCONTROLLED TYPE 1 DIABETES MELLITUS WITH HYPERGLYCEMIA (HCC): ICD-10-CM

## 2017-09-05 DIAGNOSIS — O24.414 INSULIN CONTROLLED GESTATIONAL DIABETES MELLITUS (GDM) IN FIRST TRIMESTER: ICD-10-CM

## 2017-09-05 DIAGNOSIS — Z46.81 INSULIN PUMP FITTING OR ADJUSTMENT: ICD-10-CM

## 2017-09-05 RX ORDER — INSULIN GLARGINE 100 [IU]/ML
INJECTION, SOLUTION SUBCUTANEOUS
Qty: 20 ML | Refills: 3 | Status: SHIPPED | OUTPATIENT
Start: 2017-09-05 | End: 2020-09-02

## 2017-09-05 RX ORDER — INSULIN ASPART 100 [IU]/ML
INJECTION, SOLUTION INTRAVENOUS; SUBCUTANEOUS
Qty: 40 ML | Refills: 11 | Status: SHIPPED | OUTPATIENT
Start: 2017-09-05 | End: 2018-10-10 | Stop reason: SDUPTHER

## 2017-09-05 RX ORDER — FERROUS SULFATE 325(65) MG
TABLET, DELAYED RELEASE (ENTERIC COATED) ORAL
COMMUNITY
Start: 2014-07-28 | End: 2021-09-15

## 2017-09-05 NOTE — PROGRESS NOTES
[facility]               Serge Gabriel MD        [address] 78 [phone] Fax [phone]           Patient Information  Date:9/6/2017  Name : Mihir Ramsey 25 y.o.     YOB: 1995         Referred by: None     OB Dr Griselda Kim, [facility] [phone]    Chief Complaint   Patient presents with    Diabetes       History of Present Illness: Mihir Ramsey is a 25 y.o. female here for follow up       Reviewed the pump download and CGM download    Ran out of supplies today and still waiting for supplies from [facility]   Wants to switch to [address] - discussed capacity hold is 200 units   BG is high     Cannot afford healthy food as she has to feed her brother and boyfriend , brother is autistic      She was diagnosed with type 1 diabetes mellitus at age 10. Prior hx    She was seen by Dr. Scar Brown, pediatric endocrinologist . Tresa Jeff took her baby  and she is here with Diabetic Service Dog. She has seen several endocrinologists . 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 Before she had the Diabetic Service Dog, she had multiple hospitalizations. Wt Readings from Last 3 Encounters:   09/05/17 190 lb 12.8 oz (86.5 kg)   03/07/17 228 lb 12.8 oz (103.8 kg)   02/01/17 214 lb 11.2 oz (97.4 kg)       BP Readings from Last 3 Encounters:   09/05/17 115/75   03/07/17 116/72   02/01/17 92/61           Past Medical History:   Diagnosis Date    Asperger's syndrome     Asthma     Bipolar 1 disorder (HCC)     Depression     Diabetes ([facility] [address]     E-coli UTI     Endocrine disease     diabetes    FHx: allergies     Manic depression (HCC)     Vision decreased      Current Outpatient Prescriptions   Medication Sig    ferrous sulfate (IRON) 325 mg (65 mg iron) EC tablet Take 1 tablet by mouth 3 times daily (with meals),    THALIA 250 mg/mL (1 mL) oil every seven (7) days.     GLUCAGON EMERGENCY KIT, HUMAN, 1 mg injection INJECT 1 ML INTRAMUSCULARLY ONCE FOR 1 DOSE.  glucose blood VI test strips (CONTOUR NEXT STRIPS) strip Test blood glucose 6x daily Dx Code: O24.414    Lancets misc Test blood glucose 6x daily Dx Code: O24.414    insulin aspart (NOVOLOG) 100 unit/mL injection Use with insulin pump Max units daily: 100    busPIRone (BUSPAR) 10 mg tablet Take 10 mg by mouth two (2) times a day.  Blood-Glucose Meter (CONTOUR NEXT METER) misc Test blood glucose 4x daily    insulin syringe-needle U-100 (BD INSULIN SYRINGE ULTRA-FINE) 1/2 mL 31 x 15/64\" syrg 1 Syringe by SubCUTAneous route Before breakfast, lunch, dinner and at bedtime.  traZODone (DESYREL) 50 mg tablet Take 25 mg by mouth as needed for Sleep.  diclofenac potassium (CATAFLAM) 50 mg tablet Take 50 mg by mouth two (2) times a day.  tizanidine (ZANAFLEX) 2 mg tablet Take 1 tablet by mouth three (3) times daily as needed.  sertraline (ZOLOFT) 100 mg tablet Take 1 Tab by mouth daily. Indications: DEPRESSION    omeprazole (PRILOSEC) 20 mg capsule Take 10 mg by mouth daily. Not sure of dose    loratadine (CLARITIN) 10 mg tablet Take 10 mg by mouth daily.  albuterol (PROAIR HFA) 90 mcg/Actuation inhaler Take 1 Puff by inhalation every four (4) hours as needed.  insulin aspart (NOVOLOG) 100 unit/mL injection Inject 1 unit per 10 grams of carbs w/ correction scale max daily dose of 100 units    insulin glargine (LANTUS) 100 unit/mL injection Inject 18 units in QHS    CHILD ASPIRIN 81 mg chewable tablet CHEW ONE TABLET BY MOUTH EVERY DAY FOR 30 DAYS    PROMETHAZINE HCL (PHENERGAN PO) Take  by mouth as needed.  folic acid (FOLVITE) 1 mg tablet Take  by mouth four (4) times daily.  methocarbamol (ROBAXIN) 500 mg tablet Take 500 mg by mouth every six (6) hours as needed.  MEDROXYPROGESTERONE ACETATE (DEPO-PROVERA IM) by IntraMUSCular route.  DESOGESTREL-ETHINYL ESTRADIOL (APRI PO) Take  by mouth.      No current facility-administered medications for this visit. Allergies   Allergen Reactions    Latex Rash    Other Food Itching     Califlower    Banana Itching and Nausea and Vomiting    Benadryl [Diphenhydramine Hcl] Rash    Children's Tylenol [Acetaminophen] Rash    Honey Itching     Itchy tounge    Kiwi Itching and Nausea and Vomiting    Lactose Nausea and Vomiting     Only milk is the issue    Peach (Prunus Persica) Itching and Nausea and Vomiting     Allergic reactions to raw peaches    Pineapple Itching and Nausea and Vomiting    Strawberry Itching and Nausea and Vomiting     Allergies to raw strawberries         Review of Systems:  -   - Eyes: no blurry vision no double vision  - Cardiovascular: no chest pain ,no palpitations  - Respiratory: no cough no shortness of breath  - Gastrointestinal: no dysphagia no  abdominal pain  - Musculoskeletal: no joint pains no  weakness  - Integumentary: no rashes  -   -     Physical Examination:   Blood pressure 115/75, pulse 90, temperature 97.2 °F (36.2 °C), temperature source Oral, resp. rate 18, height 5' (1.524 m), weight 190 lb 12.8 oz (86.5 kg), SpO2 98 %. Estimated body mass index is 37.26 kg/(m^2) as calculated from the following:    Height as of this encounter: 5' (1.524 m). -   Weight as of this encounter: 190 lb 12.8 oz (86.5 kg). - General: pleasant, no distress, good eye contact  - HEENT: no pallor, no periorbital edema, EOMI  - Neck: supple, no thyromegaly,   - Cardiovascular: regular, normal rate, normal S1 and S2  - Respiratory: clear to auscultation bilaterally  - Gastrointestinal: soft, gravid abdomen  - Musculoskeletal:  + edema  - Neurological: alert and oriented  - Psychiatric: normal mood and affect  - Skin: color, texture, turgor normal.       Data Reviewed:     [] Glucose records reviewed. [] See glucose records for details (to be scanned).   [] A1C  [] Reviewed labs    Lab Results   Component Value Date/Time    Hemoglobin A1c 9.1 05/03/2016 03:52 PM    Hemoglobin A1c CANCELED 01/06/2016 12:06 PM    Hemoglobin A1c 8.8 07/27/2015 12:00 AM    Glucose 160 05/03/2016 03:52 PM    Glucose 290 12/15/2013 06:00 AM    Glucose (POC) 215 01/05/2014 07:30 PM    Glucose  12/05/2016 03:10 PM    Microalb/Creat ratio (ug/mg creat.) 2.9 05/03/2016 03:52 PM    LDL,Direct 129 01/05/2015 02:18 PM    LDL, calculated 81 05/03/2016 03:52 PM    Creatinine (POC) 0.8 11/04/2014 03:12 PM    Creatinine 0.59 05/03/2016 03:52 PM      Lab Results   Component Value Date/Time    Cholesterol, total 169 05/03/2016 03:52 PM    HDL Cholesterol 74 05/03/2016 03:52 PM    LDL,Direct 129 01/05/2015 02:18 PM    LDL, calculated 81 05/03/2016 03:52 PM    Triglyceride 68 05/03/2016 03:52 PM    CHOL/HDL Ratio 2.6 12/15/2013 06:00 AM     Lab Results   Component Value Date/Time    ALT (SGPT) 12 05/03/2016 03:52 PM    AST (SGOT) 14 05/03/2016 03:52 PM    Alk. phosphatase 101 05/03/2016 03:52 PM    Bilirubin, total 0.3 05/03/2016 03:52 PM     Lab Results   Component Value Date/Time    GFR est  05/03/2016 03:52 PM    GFR est non- 05/03/2016 03:52 PM    Creatinine (POC) 0.8 11/04/2014 03:12 PM    Creatinine 0.59 05/03/2016 03:52 PM    BUN 12 05/03/2016 03:52 PM    Sodium 139 05/03/2016 03:52 PM    Potassium 4.4 05/03/2016 03:52 PM    Chloride 99 05/03/2016 03:52 PM    CO2 23 05/03/2016 03:52 PM      Lab Results   Component Value Date/Time    TSH 0.423 02/01/2017 03:23 PM    Triiodothyronine (T3), free 2.3 04/02/2013 12:00 AM    T4, Free 1.33 01/28/2014 03:46 PM        Assessment/Plan:     1. Hyperglycemia due to type 1 diabetes mellitus ([address]    2. Insulin pump fitting or adjustment    3. Encounter for long-term (current) use of insulin ([facility] [address]    4. BMI 33.0-33.9,adult        1.  Type 1 Diabetes Mellitus ,Uncontrolled  Lab Results   Component Value Date/Time    Hemoglobin A1c 9.1 05/03/2016 03:52 PM    Hemoglobin A1c (POC) 6.7 12/05/2016 03:10 PM      On Tandem insulin pump CGM data reviewed   Need to input correct carbs   Eye exam in DEcember 2016   - food choices are her main issue   Wishes to switch to Omnipod      2 Obesity:Body mass index is 37.26 kg/(m^2). She seems to be on a high carbohydrate diet due to family situation ,getting help from [facility] and carb counting will definitely help her limit.     3 depression - stable          Patient Instructions       Lantus 18 units at night     Novolog carb ratio 1 for every 10 grams of carbs    Correction 1 unit of Novolog for every 20 points of glucose  over 120 of glucose       Follow-up Disposition:  Return in about 2 months (around 11/5/2017). Thank you for allowing me to participate in the care of this patient.     Dave Bunch MD

## 2017-09-05 NOTE — MR AVS SNAPSHOT
Visit Information Date & Time Provider Department Dept. Phone Encounter #  
 9/5/2017  2:30 PM Anny Chen MD [facility] & Endocrinology [phone] [id number] Follow-up Instructions Return in about 2 months (around 11/5/2017). Upcoming Health Maintenance Date Due  
 FOOT EXAM Q1 5/15/2005 HPV AGE 9Y-26Y (1 of 3 - Female 3 Dose Series) 5/15/2006 Pneumococcal 19-64 Medium Risk (1 of 1 - PPSV23) 5/15/2014 DTaP/Tdap/Td series (1 - Tdap) 5/15/2016 PAP AKA CERVICAL CYTOLOGY 5/15/2016 OB 3RD TRIMESTER TDAP 1/5/2017 MICROALBUMIN Q1 5/3/2017 LIPID PANEL Q1 5/3/2017 HEMOGLOBIN A1C Q6M 6/5/2017 INFLUENZA AGE 9 TO ADULT 8/1/2017 EYE EXAM RETINAL OR DILATED Q1 12/22/2017 Allergies as of 9/5/2017  Review Complete On: 9/5/2017 By: Anny Chen MD  
  
 Severity Noted Reaction Type Reactions Latex  12/20/2011    Rash Other Food  01/19/2015    Itching Nesha Greener Banana  12/20/2011   Topical Itching, Nausea and Vomiting Benadryl [Diphenhydramine Hcl]  12/20/2011    Rash Children's Tylenol [Acetaminophen]  12/20/2011    Rash Honey  12/20/2011   Topical Itching Itchy tounge Kiwi  12/20/2011   Topical Itching, Nausea and Vomiting Lactose  12/20/2011   Intolerance Nausea and Vomiting Only milk is the issue Peach (Prunus Persica)  12/20/2011   Topical Itching, Nausea and Vomiting Allergic reactions to raw peaches Pineapple  12/20/2011   Topical Itching, Nausea and Vomiting Strawberry  12/20/2011   Topical Itching, Nausea and Vomiting Allergies to raw strawberries Current Immunizations  Never Reviewed No immunizations on file. Not reviewed this visit You Were Diagnosed With   
  
 Codes Comments Hyperglycemia due to type 1 diabetes mellitus ([facility] [address]    -  Primary ICD-10-CM: E10.65 ICD-9-CM: 250.01 Vitals BP Pulse Temp Resp Height(growth percentile) Weight(growth percentile) 115/75 (BP 1 Location: Right arm, BP Patient Position: Sitting) 90 97.2 °F (36.2 °C) (Oral) 18 5' (1.524 m) 190 lb 12.8 oz (86.5 kg) SpO2 BMI OB Status Smoking Status 98% 37.26 kg/m2 Pregnant Current Some Day Smoker BMI and BSA Data Body Mass Index Body Surface Area  
 37.26 kg/m 2 1.91 m 2 Preferred Pharmacy Pharmacy Name Phone [phone] Your Updated Medication List  
  
   
This list is accurate as of: 9/5/17  4:00 PM.  Always use your most recent med list.  
  
  
  
  
 APRI PO Take  by mouth. Blood-Glucose Meter Misc Commonly known as:  CONTOUR NEXT METER Test blood glucose 4x daily  
  
 busPIRone 10 mg tablet Commonly known as:  BUSPAR Take 10 mg by mouth two (2) times a day. CHILD ASPIRIN 81 mg chewable tablet Generic drug:  aspirin CHEW ONE TABLET BY MOUTH EVERY DAY FOR 30 DAYS  
  
 DEPO-PROVERA IM  
by IntraMUSCular route. diclofenac potassium 50 mg tablet Commonly known as:  CATAFLAM  
Take 50 mg by mouth two (2) times a day. ferrous sulfate 325 mg (65 mg iron) EC tablet Commonly known as:  IRON Take 1 tablet by mouth 3 times daily (with meals),  
  
 folic acid 1 mg tablet Commonly known as:  Google Take  by mouth four (4) times daily. GLUCAGON EMERGENCY KIT (HUMAN) 1 mg injection Generic drug:  glucagon INJECT 1 ML INTRAMUSCULARLY ONCE FOR 1 DOSE.  
  
 glucose blood VI test strips strip Commonly known as:  CONTOUR NEXT STRIPS Test blood glucose 6x daily Dx Code: O24.414  
  
 * insulin aspart 100 unit/mL injection Commonly known as:  Kang Vick Use with insulin pump Max units daily: 100 * NovoLOG 100 unit/mL injection Generic drug:  insulin aspart INJECT 8-15 UNITS BEFORE EACH MEAL W/ SSI  
  
 insulin glargine 100 unit/mL injection Commonly known as:  LANTUS Inject 30 units in AM  
  
 insulin syringe-needle U-100 1/2 mL 31 gauge x 15/64\" Syrg Commonly known as:  BD INSULIN SYRINGE ULTRA-FINE  
1 Syringe by SubCUTAneous route Before breakfast, lunch, dinner and at bedtime. Lancets Misc Test blood glucose 6x daily Dx Code: O24.414  
  
 loratadine 10 mg tablet Commonly known as:  Katalina Muss Take 10 mg by mouth daily. THALIA 250 mg/mL (1 mL) Oil injection Generic drug:  HYDROXYprogesterone (PF)  
every seven (7) days. methocarbamol 500 mg tablet Commonly known as:  ROBAXIN Take 500 mg by mouth every six (6) hours as needed. omeprazole 20 mg capsule Commonly known as:  PRILOSEC Take 10 mg by mouth daily. Not sure of dose PHENERGAN PO Take  by mouth as needed. PROAIR HFA 90 mcg/actuation inhaler Generic drug:  albuterol Take 1 Puff by inhalation every four (4) hours as needed. sertraline 100 mg tablet Commonly known as:  ZOLOFT Take 1 Tab by mouth daily. Indications: DEPRESSION  
  
 tiZANidine 2 mg tablet Commonly known as:  Rock Mcduffie Take 1 tablet by mouth three (3) times daily as needed. traZODone 50 mg tablet Commonly known as:  Dandre Casa Take 25 mg by mouth as needed for Sleep. * Notice: This list has 2 medication(s) that are the same as other medications prescribed for you. Read the directions carefully, and ask your doctor or other care provider to review them with you. Follow-up Instructions Return in about 2 months (around 11/5/2017). Patient Instructions Lantus 18 units at night Novolog carb ratio 1 for every 10 grams of carbs Correction 1 unit of Novolog for every 20 points of glucose  over 120 of glucose Introducing [facility]! Dear Nell Mayer: 
Thank you for requesting a ROKT account. Our records indicate that you already have an active ROKT account. You can access your account anytime at [email or web address] Did you know that you can access your hospital and ER discharge instructions at any time in DAQRI? You can also review all of your test results from your hospital stay or ER visit. Additional Information If you have questions, please visit the Frequently Asked Questions section of the [employer] website at [email or web address] Remember, [employer]QRI is NOT to be used for urgent needs. For medical emergencies, dial 911. Now available from your iPhone and Android! Please provide this summary of care documentation to your next provider. Your primary care clinician is listed as NONE. If you have any questions after today's visit, please call [phone].

## 2017-09-05 NOTE — PROGRESS NOTES
Wt Readings from Last 3 Encounters:   09/05/17 190 lb 12.8 oz (86.5 kg)   03/07/17 228 lb 12.8 oz (103.8 kg)   02/01/17 214 lb 11.2 oz (97.4 kg)     Temp Readings from Last 3 Encounters:   09/05/17 97.2 °F (36.2 °C) (Oral)   03/07/17 96.4 °F (35.8 °C) (Oral)   02/01/17 97.1 °F (36.2 °C) (Oral)     BP Readings from Last 3 Encounters:   09/05/17 115/75   03/07/17 116/72   02/01/17 92/61     Pulse Readings from Last 3 Encounters:   09/05/17 90   03/07/17 97   02/01/17 (!) 114     Lab Results   Component Value Date/Time    Hemoglobin A1c 9.1 05/03/2016 03:52 PM    Hemoglobin A1c (POC) 6.7 12/05/2016 03:10 PM   Foot exam: No  Eye exam: Jan 2017  1. Have you been to the ER, urgent care clinic since your last visit? Hospitalized since your last visit? No    2. Have you seen or consulted any other health care providers outside of the [facility] since your last visit? Include any pap smears or colon screening. [address] and psychiatrist..

## 2017-09-05 NOTE — PATIENT INSTRUCTIONS
Lantus 18 units at night     Novolog carb ratio 1 for every 10 grams of carbs    Correction 1 unit of Novolog for every 20 points of glucose  over 120 of glucose

## 2017-11-07 ENCOUNTER — OFFICE VISIT (OUTPATIENT)
Dept: ENDOCRINOLOGY | Age: 22
End: 2017-11-07

## 2017-11-07 VITALS
TEMPERATURE: 97.7 F | HEIGHT: 60 IN | OXYGEN SATURATION: 98 % | RESPIRATION RATE: 16 BRPM | HEART RATE: 83 BPM | WEIGHT: 188.9 LBS | BODY MASS INDEX: 37.08 KG/M2 | DIASTOLIC BLOOD PRESSURE: 73 MMHG | SYSTOLIC BLOOD PRESSURE: 108 MMHG

## 2017-11-07 DIAGNOSIS — E10.65 HYPERGLYCEMIA DUE TO TYPE 1 DIABETES MELLITUS (HCC): Primary | ICD-10-CM

## 2017-11-07 DIAGNOSIS — Z46.81 INSULIN PUMP FITTING OR ADJUSTMENT: ICD-10-CM

## 2017-11-07 NOTE — PROGRESS NOTES
[facility]               Mira Kaufman MD        [address] 78 [phone] Fax [phone]           Patient Information  Date:11/8/2017  Name : Ezequiel Rivera 25 y.o.     YOB: 1995         Referred by: None       Chief Complaint   Patient presents with    Diabetes       History of Present Illness: Ezequiel Rivera is a 25 y.o. female here for follow up       Reviewed the pump download , out of supplies and waiting to get supplies form [facility] [place] , [employer] has sent a temporary supply     Wants to switch to [address] afford healthy food as she has to feed her brother and boyfriend , brother is autistic      She was diagnosed with type 1 diabetes mellitus at age 10. Prior hx    She was seen by Dr. Sarita Oswald, pediatric endocrinologist . Jace Palacios took her baby  and she is here with Diabetic Service Dog. She has seen several endocrinologists . 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 Before she had the Diabetic Service Dog, she had multiple hospitalizations. Wt Readings from Last 3 Encounters:   11/07/17 188 lb 14.4 oz (85.7 kg)   09/05/17 190 lb 12.8 oz (86.5 kg)   03/07/17 228 lb 12.8 oz (103.8 kg)       BP Readings from Last 3 Encounters:   11/07/17 108/73   09/05/17 115/75   03/07/17 116/72           Past Medical History:   Diagnosis Date    Asperger's syndrome     Asthma     Bipolar 1 disorder (HCC)     Depression     Diabetes ([facility] [address]     E-coli UTI     Endocrine disease     diabetes    FHx: allergies     Manic depression (HCC)     Vision decreased      Current Outpatient Prescriptions   Medication Sig    zinc 50 mg tab tablet Take  by mouth daily.     CONTOUR NEXT STRIPS strip TEST BLOOD GLUCOSE 6 TIMES DAILY DX CODE: O24.414    ferrous sulfate (IRON) 325 mg (65 mg iron) EC tablet Take 1 tablet by mouthdaily (with meals),    insulin aspart (NOVOLOG) 100 unit/mL injection Inject 1 unit per 10 grams of carbs w/ correction scale max daily dose of 100 units    insulin glargine (LANTUS) 100 unit/mL injection Inject 18 units in QHS    GLUCAGON EMERGENCY KIT, HUMAN, 1 mg injection INJECT 1 ML INTRAMUSCULARLY ONCE FOR 1 DOSE.  Lancets misc Test blood glucose 6x daily Dx Code: O24.414    insulin aspart (NOVOLOG) 100 unit/mL injection Use with insulin pump Max units daily: 100    busPIRone (BUSPAR) 10 mg tablet Take 10 mg by mouth two (2) times a day.  Blood-Glucose Meter (CONTOUR NEXT METER) misc Test blood glucose 4x daily    insulin syringe-needle U-100 (BD INSULIN SYRINGE ULTRA-FINE) 1/2 mL 31 x 15/64\" syrg 1 Syringe by SubCUTAneous route Before breakfast, lunch, dinner and at bedtime.  traZODone (DESYREL) 50 mg tablet Take 25 mg by mouth as needed for Sleep.  sertraline (ZOLOFT) 100 mg tablet Take 1 Tab by mouth daily. Indications: DEPRESSION    omeprazole (PRILOSEC) 20 mg capsule Take 10 mg by mouth daily. Not sure of dose    loratadine (CLARITIN) 10 mg tablet Take 10 mg by mouth daily.  albuterol (PROAIR HFA) 90 mcg/Actuation inhaler Take 1 Puff by inhalation every four (4) hours as needed.  THALIA 250 mg/mL (1 mL) oil every seven (7) days.  CHILD ASPIRIN 81 mg chewable tablet CHEW ONE TABLET BY MOUTH EVERY DAY FOR 30 DAYS    PROMETHAZINE HCL (PHENERGAN PO) Take  by mouth as needed.  folic acid (FOLVITE) 1 mg tablet Take  by mouth four (4) times daily.  diclofenac potassium (CATAFLAM) 50 mg tablet Take 50 mg by mouth two (2) times a day.  methocarbamol (ROBAXIN) 500 mg tablet Take 500 mg by mouth every six (6) hours as needed.  MEDROXYPROGESTERONE ACETATE (DEPO-PROVERA IM) by IntraMUSCular route.  tizanidine (ZANAFLEX) 2 mg tablet Take 1 tablet by mouth three (3) times daily as needed.  DESOGESTREL-ETHINYL ESTRADIOL (APRI PO) Take  by mouth. No current facility-administered medications for this visit. Allergies   Allergen Reactions    Latex Rash    Other Food Itching     Califlower    Banana Itching and Nausea and Vomiting    Benadryl [Diphenhydramine Hcl] Rash    Children's Tylenol [Acetaminophen] Rash    Honey Itching     Itchy tounge    Kiwi Itching and Nausea and Vomiting    Lactose Nausea and Vomiting     Only milk is the issue    Peach (Prunus Persica) Itching and Nausea and Vomiting     Allergic reactions to raw peaches    Pineapple Itching and Nausea and Vomiting    Strawberry Itching and Nausea and Vomiting     Allergies to raw strawberries         Review of Systems:  -   - Eyes: no blurry vision no double vision  - Cardiovascular: no chest pain ,no palpitations  - Respiratory: no cough no shortness of breath  - Gastrointestinal: no dysphagia no  abdominal pain  - Musculoskeletal: no joint pains no  weakness  - Integumentary: no rashes  -   -     Physical Examination:   Blood pressure 108/73, pulse 83, temperature 97.7 °F (36.5 °C), temperature source Oral, resp. rate 16, height 5' (1.524 m), weight 188 lb 14.4 oz (85.7 kg), SpO2 98 %. Estimated body mass index is 36.89 kg/(m^2) as calculated from the following:    Height as of this encounter: 5' (1.524 m). -   Weight as of this encounter: 188 lb 14.4 oz (85.7 kg). - General: pleasant, no distress, good eye contact  - HEENT: no pallor, no periorbital edema, EOMI  - Neck: supple, no thyromegaly,   - Cardiovascular: regular, normal rate, normal S1 and S2  - Respiratory: clear to auscultation bilaterally  - Gastrointestinal: soft, gravid abdomen  - Musculoskeletal:  + edema  - Neurological: alert and oriented  - Psychiatric: normal mood and affect  - Skin: color, texture, turgor normal.       Data Reviewed:     [] Glucose records reviewed. [] See glucose records for details (to be scanned).   [] A1C  [] Reviewed labs    Lab Results   Component Value Date/Time    Hemoglobin A1c 8.0 11/07/2017 02:32 PM    Hemoglobin A1c 9.1 05/03/2016 03:52 PM    Hemoglobin A1c CANCELED 01/06/2016 12:06 PM    Glucose 363 11/07/2017 02:32 PM    Glucose 290 12/15/2013 06:00 AM    Glucose (POC) 215 01/05/2014 07:30 PM    Glucose  12/05/2016 03:10 PM    Microalb/Creat ratio (ug/mg creat.) 2.9 05/03/2016 03:52 PM    LDL,Direct 129 01/05/2015 02:18 PM    LDL, calculated 81 05/03/2016 03:52 PM    Creatinine (POC) 0.8 11/04/2014 03:12 PM    Creatinine 0.68 11/07/2017 02:32 PM      Lab Results   Component Value Date/Time    Cholesterol, total 169 05/03/2016 03:52 PM    HDL Cholesterol 74 05/03/2016 03:52 PM    LDL,Direct 129 01/05/2015 02:18 PM    LDL, calculated 81 05/03/2016 03:52 PM    Triglyceride 68 05/03/2016 03:52 PM    CHOL/HDL Ratio 2.6 12/15/2013 06:00 AM     Lab Results   Component Value Date/Time    ALT (SGPT) 9 11/07/2017 02:32 PM    AST (SGOT) 12 11/07/2017 02:32 PM    Alk. phosphatase 125 11/07/2017 02:32 PM    Bilirubin, total 0.3 11/07/2017 02:32 PM     Lab Results   Component Value Date/Time    GFR est  11/07/2017 02:32 PM    GFR est non- 11/07/2017 02:32 PM    Creatinine (POC) 0.8 11/04/2014 03:12 PM    Creatinine 0.68 11/07/2017 02:32 PM    BUN 10 11/07/2017 02:32 PM    Sodium 139 11/07/2017 02:32 PM    Potassium 4.8 11/07/2017 02:32 PM    Chloride 98 11/07/2017 02:32 PM    CO2 24 11/07/2017 02:32 PM      Lab Results   Component Value Date/Time    TSH 0.815 11/07/2017 02:32 PM    Triiodothyronine (T3), free 2.3 04/02/2013 12:00 AM    T4, Free 1.33 01/28/2014 03:46 PM        Assessment/Plan:     1. Hyperglycemia due to type 1 diabetes mellitus ([facility] [address]    2. Insulin pump fitting or adjustment        1.  Type 1 Diabetes Mellitus   Lab Results   Component Value Date/Time    Hemoglobin A1c 8.0 11/07/2017 02:32 PM    Hemoglobin A1c (POC) 6.7 12/05/2016 03:10 PM      On [employer] insulin pump   Need to put in correct carbs   Eye exam in DEcember 2016   - food choices are her main issue   Wishes to switch to Omnipod      2 Obesity:Body mass index is 36.89 kg/(m^2). She seems to be on a high carbohydrate diet due to family situation ,getting help from [facility] and carb counting will definitely help her limit.     3 depression - stable          Patient Instructions       Lantus 20 units at night     Novolog carb ratio 1 for every 10 grams of carbs    Correction 1 unit of Novolog for every 20 points of glucose  over 120 of glucose       Follow-up Disposition:  Return in about 3 months (around 2/7/2018). Thank you for allowing me to participate in the care of this patient.     Elisabeth Chen MD

## 2017-11-07 NOTE — MR AVS SNAPSHOT
Visit Information Date & Time Provider Department Dept. Phone Encounter #  
 11/7/2017  1:15 PM Desean Bynum MD [facility] [phone] [id number] Follow-up Instructions Return in about 3 months (around 2/7/2018). Upcoming Health Maintenance Date Due  
 FOOT EXAM Q1 5/15/2005 HPV AGE 9Y-26Y (1 of 3 - Female 3 Dose Series) 5/15/2006 Pneumococcal 19-64 Medium Risk (1 of 1 - PPSV23) 5/15/2014 DTaP/Tdap/Td series (1 - Tdap) 5/15/2016 PAP AKA CERVICAL CYTOLOGY 5/15/2016 OB 3RD TRIMESTER TDAP 1/5/2017 MICROALBUMIN Q1 5/3/2017 LIPID PANEL Q1 5/3/2017 HEMOGLOBIN A1C Q6M 6/5/2017 Influenza Age 5 to Adult 8/1/2017 EYE EXAM RETINAL OR DILATED Q1 12/22/2017 Allergies as of 11/7/2017  Review Complete On: 11/7/2017 By: Schuyler Mena LPN Severity Noted Reaction Type Reactions Latex  12/20/2011    Rash Other Food  01/19/2015    Itching Lonita Groom Banana  12/20/2011   Topical Itching, Nausea and Vomiting Benadryl [Diphenhydramine Hcl]  12/20/2011    Rash Children's Tylenol [Acetaminophen]  12/20/2011    Rash Honey  12/20/2011   Topical Itching Itchy tounge Kiwi  12/20/2011   Topical Itching, Nausea and Vomiting Lactose  12/20/2011   Intolerance Nausea and Vomiting Only milk is the issue Peach (Prunus Persica)  12/20/2011   Topical Itching, Nausea and Vomiting Allergic reactions to raw peaches Pineapple  12/20/2011   Topical Itching, Nausea and Vomiting Strawberry  12/20/2011   Topical Itching, Nausea and Vomiting Allergies to raw strawberries Current Immunizations  Never Reviewed No immunizations on file. Not reviewed this visit You Were Diagnosed With   
  
 Codes Comments Pre-existing type 1 diabetes mellitus during pregnancy in second trimester    -  Primary ICD-10-CM: O24.012 
ICD-9-CM: 648.03, 250.01 Vitals BP Pulse Temp Resp Height(growth percentile) Weight(growth percentile) 108/73 (BP 1 Location: Left arm, BP Patient Position: Sitting) 83 97.7 °F (36.5 °C) (Oral) 16 5' (1.524 m) 188 lb 14.4 oz (85.7 kg) SpO2 BMI OB Status Smoking Status 98% 36.89 kg/m2 Pregnant Current Some Day Smoker Vitals History BMI and BSA Data Body Mass Index Body Surface Area  
 36.89 kg/m 2 1.9 m 2 [facility] Pharm[id number] [phone] Your Updated Medication List  
  
   
This list is accurate as of: 11/7/17  2:11 PM.  Always use your most recent med list.  
  
  
  
  
 APRI PO Take  by mouth. Blood-Glucose Meter Misc Commonly known as:  CONTOUR NEXT METER Test blood glucose 4x daily  
  
 busPIRone 10 mg tablet Commonly known as:  BUSPAR Take 10 mg by mouth two (2) times a day. CHILD ASPIRIN 81 mg chewable tablet Generic drug:  aspirin CHEW ONE TABLET BY MOUTH EVERY DAY FOR 30 DAYS CONTOUR NEXT STRIPS strip Generic drug:  glucose blood VI test strips TEST BLOOD GLUCOSE 6 TIMES DAILY DX CODE: O24.414 DEPO-PROVERA IM  
by IntraMUSCular route. diclofenac potassium 50 mg tablet Commonly known as:  CATAFLAM  
Take 50 mg by mouth two (2) times a day. ferrous sulfate 325 mg (65 mg iron) EC tablet Commonly known as:  IRON Take 1 tablet by mouthdaily (with meals),  
  
 folic acid 1 mg tablet Commonly known as:  Google Take  by mouth four (4) times daily. GLUCAGON EMERGENCY KIT (HUMAN) 1 mg injection Generic drug:  glucagon INJECT 1 ML INTRAMUSCULARLY ONCE FOR 1 DOSE. * insulin aspart 100 unit/mL injection Commonly known as:  Autumn Left Use with insulin pump Max units daily: 100  
  
 * insulin aspart 100 unit/mL injection Commonly known as:  Autumn Left Inject 1 unit per 10 grams of carbs w/ correction scale max daily dose of 100 units insulin glargine 100 unit/mL injection Commonly known as:  LANTUS Inject 18 units in QHS  
  
 insulin syringe-needle U-100 1/2 mL 31 gauge x 15/64\" Syrg Commonly known as:  BD INSULIN SYRINGE ULTRA-FINE  
1 Syringe by SubCUTAneous route Before breakfast, lunch, dinner and at bedtime. Lancets Misc Test blood glucose 6x daily Dx Code: O24.414  
  
 loratadine 10 mg tablet Commonly known as:  Michaell Organ Take 10 mg by mouth daily. THALIA 250 mg/mL (1 mL) Oil injection Generic drug:  HYDROXYprogesterone (PF)  
every seven (7) days. methocarbamol 500 mg tablet Commonly known as:  ROBAXIN Take 500 mg by mouth every six (6) hours as needed. omeprazole 20 mg capsule Commonly known as:  PRILOSEC Take 10 mg by mouth daily. Not sure of dose PHENERGAN PO Take  by mouth as needed. PROAIR HFA 90 mcg/actuation inhaler Generic drug:  albuterol Take 1 Puff by inhalation every four (4) hours as needed. sertraline 100 mg tablet Commonly known as:  ZOLOFT Take 1 Tab by mouth daily. Indications: DEPRESSION  
  
 tiZANidine 2 mg tablet Commonly known as:  Rosalva Flattery Take 1 tablet by mouth three (3) times daily as needed. traZODone 50 mg tablet Commonly known as:  Arturo Aviston Take 25 mg by mouth as needed for Sleep.  
  
 zinc 50 mg Tab tablet Take  by mouth daily. * Notice: This list has 2 medication(s) that are the same as other medications prescribed for you. Read the directions carefully, and ask your doctor or other care provider to review them with you. We Performed the Following HEMOGLOBIN A1C WITH EAG [47854 CPT(R)] METABOLIC PANEL, COMPREHENSIVE [19071 CPT(R)] TSH 3RD GENERATION [35987 CPT(R)] Follow-up Instructions Return in about 3 months (around 2/7/2018). Patient Instructions Lantus 20 units at night Novolog carb ratio 1 for every 10 grams of carbs Correction 1 unit of Novolog for every 20 points of glucose  over 120 of glucose Introducing [facility] & HEALTH SERVICES! Dear Rosalva Neff: 
Thank you for requesting a [employer] account. Our records indicate that you already have an active [employer] account. You can access your account anytime at [email or web address] Did you know that you can access your hospital and ER discharge instructions at any time in [employer]? You can also review all of your test results from your hospital stay or ER visit. Additional Information If you have questions, please visit the Frequently Asked Questions section of the [employer] website at [email or web address] Remember, [employer] is NOT to be used for urgent needs. For medical emergencies, dial 911. Now available from your iPhone and Android! Please provide this summary of care documentation to your next provider. Your primary care clinician is listed as NONE. If you have any questions after today's visit, please call [phone].

## 2017-11-07 NOTE — PROGRESS NOTES
Tommy Sheriff is a 25 y.o. female here for   Chief Complaint   Patient presents with    Diabetes       Functional glucose monitor and record keeping system? - yes      1. Have you been to the ER, urgent care clinic since your last visit? Hospitalized since your last visit? -no    2. Have you seen or consulted any other health care providers outside of the [facility] since your last visit?   Include any pap smears or colon screening.-no      Lab Results   Component Value Date/Time    Hemoglobin A1c 9.1 05/03/2016 03:52 PM    Hemoglobin A1c (POC) 6.7 12/05/2016 03:10 PM       Wt Readings from Last 3 Encounters:   09/05/17 190 lb 12.8 oz (86.5 kg)   03/07/17 228 lb 12.8 oz (103.8 kg)   02/01/17 214 lb 11.2 oz (97.4 kg)     Temp Readings from Last 3 Encounters:   09/05/17 97.2 °F (36.2 °C) (Oral)   03/07/17 96.4 °F (35.8 °C) (Oral)   02/01/17 97.1 °F (36.2 °C) (Oral)     BP Readings from Last 3 Encounters:   09/05/17 115/75   03/07/17 116/72   02/01/17 92/61     Pulse Readings from Last 3 Encounters:   09/05/17 90   03/07/17 97   02/01/17 (!) 114

## 2017-11-07 NOTE — PATIENT INSTRUCTIONS
Lantus 20 units at night     Novolog carb ratio 1 for every 10 grams of carbs    Correction 1 unit of Novolog for every 20 points of glucose  over 120 of glucose

## 2017-11-08 PROBLEM — O24.012 PRE-EXISTING TYPE 1 DIABETES MELLITUS DURING PREGNANCY IN SECOND TRIMESTER: Status: RESOLVED | Noted: 2017-01-10 | Resolved: 2017-11-08

## 2017-11-08 LAB
ALBUMIN SERPL-MCNC: 4.4 G/DL (ref 3.5–5.5)
ALBUMIN/GLOB SERPL: 1.7 {RATIO} (ref 1.2–2.2)
ALP SERPL-CCNC: 125 IU/L (ref 39–117)
ALT SERPL-CCNC: 9 IU/L (ref 0–32)
AST SERPL-CCNC: 12 IU/L (ref 0–40)
BILIRUB SERPL-MCNC: 0.3 MG/DL (ref 0–1.2)
BUN SERPL-MCNC: 10 MG/DL (ref 6–20)
BUN/CREAT SERPL: 15 (ref 9–23)
CALCIUM SERPL-MCNC: 8.9 MG/DL (ref 8.7–10.2)
CHLORIDE SERPL-SCNC: 98 MMOL/L (ref 96–106)
CO2 SERPL-SCNC: 24 MMOL/L (ref 18–29)
CREAT SERPL-MCNC: 0.68 MG/DL (ref 0.57–1)
EST. AVERAGE GLUCOSE BLD GHB EST-MCNC: 183 MG/DL
GFR SERPLBLD CREATININE-BSD FMLA CKD-EPI: 125 ML/MIN/1.73
GFR SERPLBLD CREATININE-BSD FMLA CKD-EPI: 144 ML/MIN/1.73
GLOBULIN SER CALC-MCNC: 2.6 G/DL (ref 1.5–4.5)
GLUCOSE SERPL-MCNC: 363 MG/DL (ref 65–99)
HBA1C MFR BLD: 8 % (ref 4.8–5.6)
POTASSIUM SERPL-SCNC: 4.8 MMOL/L (ref 3.5–5.2)
PROT SERPL-MCNC: 7 G/DL (ref 6–8.5)
SODIUM SERPL-SCNC: 139 MMOL/L (ref 134–144)
TSH SERPL DL<=0.005 MIU/L-ACNC: 0.81 UIU/ML (ref 0.45–4.5)

## 2017-11-13 RX ORDER — GLUCAGON 1 MG
VIAL (EA) INJECTION
Qty: 1 KIT | Refills: 2 | Status: SHIPPED | OUTPATIENT
Start: 2017-11-13 | End: 2018-03-17 | Stop reason: SDUPTHER

## 2018-02-19 ENCOUNTER — OFFICE VISIT (OUTPATIENT)
Dept: ENDOCRINOLOGY | Age: 23
End: 2018-02-19

## 2018-02-19 VITALS
HEIGHT: 60 IN | OXYGEN SATURATION: 99 % | BODY MASS INDEX: 36.63 KG/M2 | DIASTOLIC BLOOD PRESSURE: 62 MMHG | RESPIRATION RATE: 16 BRPM | WEIGHT: 186.6 LBS | HEART RATE: 88 BPM | TEMPERATURE: 98.4 F | SYSTOLIC BLOOD PRESSURE: 100 MMHG

## 2018-02-19 DIAGNOSIS — E66.9 OBESITY (BMI 30-39.9): ICD-10-CM

## 2018-02-19 DIAGNOSIS — E10.65 HYPERGLYCEMIA DUE TO TYPE 1 DIABETES MELLITUS (HCC): Primary | ICD-10-CM

## 2018-02-19 DIAGNOSIS — Z46.81 INSULIN PUMP FITTING OR ADJUSTMENT: ICD-10-CM

## 2018-02-19 RX ORDER — ALBUTEROL SULFATE 90 UG/1
AEROSOL, METERED RESPIRATORY (INHALATION)
COMMUNITY
End: 2021-09-15

## 2018-02-19 NOTE — PROGRESS NOTES
[facility]               Gabi Montoya MD        [address] 78 [phone] Fax [phone]           Patient Information  Date:2/19/2018  Name : Author Ferrell 25 y.o.     YOB: 1995         Referred by: None       Chief Complaint   Patient presents with    Diabetes       History of Present Illness: Author Ferrell is a 25 y.o. female here for follow up       Reviewed the pump download -not putting the carbohydrates for the meals as she is afraid of hypoglycemia. she recently started working at [employer] reports hypoglycemia while at work. She is very active during work hours. Schedule fluctuates and there is no particular work schedule    No hospitalization with DKA recently      Cannot afford healthy food as she has to feed her brother and boyfriend , brother is autistic      She was diagnosed with type 1 diabetes mellitus at age 10. Prior hx    She was seen by Dr. Sadi Merino, pediatric endocrinologist . Krish Halls took her baby  and she is here with Diabetic Service Dog. She has seen several endocrinologists . 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 Before she had the Diabetic Service Dog, she had multiple hospitalizations. Wt Readings from Last 3 Encounters:   02/19/18 186 lb 9.6 oz (84.6 kg)   11/07/17 188 lb 14.4 oz (85.7 kg)   09/05/17 190 lb 12.8 oz (86.5 kg)       BP Readings from Last 3 Encounters:   02/19/18 100/62   11/07/17 108/73   09/05/17 115/75           Past Medical History:   Diagnosis Date    Asperger's syndrome     Asthma     Bipolar 1 disorder (HCC)     Depression     Diabetes ([facility]     E-coli UTI     Endocrine disease     diabetes    FHx: allergies     Manic depression ([facility]     Vision decreased      Current Outpatient Prescriptions   Medication Sig    albuterol (VENTOLIN HFA) 90 mcg/actuation inhaler Take  by inhalation.     GLUCAGON EMERGENCY KIT, HUMAN, 1 mg injection INJECT 1 ML INTRAMUSCULARLY ONCE FOR 1 DOSE.  zinc 50 mg tab tablet Take  by mouth daily.  ferrous sulfate (IRON) 325 mg (65 mg iron) EC tablet Take 1 tablet by mouthdaily (with meals),    insulin aspart (NOVOLOG) 100 unit/mL injection Use with insulin pump Max units daily: 100    busPIRone (BUSPAR) 10 mg tablet Take 10 mg by mouth two (2) times a day.  Blood-Glucose Meter (CONTOUR NEXT METER) misc Test blood glucose 4x daily    sertraline (ZOLOFT) 100 mg tablet Take 1 Tab by mouth daily. Indications: DEPRESSION    omeprazole (PRILOSEC) 20 mg capsule Take 10 mg by mouth daily. Not sure of dose    loratadine (CLARITIN) 10 mg tablet Take 10 mg by mouth daily.  CONTOUR NEXT STRIPS strip TEST BLOOD GLUCOSE 6 TIMES DAILY DX CODE: O24.414    insulin aspart (NOVOLOG) 100 unit/mL injection Inject 1 unit per 10 grams of carbs w/ correction scale max daily dose of 100 units    insulin glargine (LANTUS) 100 unit/mL injection Inject 18 units in QHS    THALIA 250 mg/mL (1 mL) oil every seven (7) days.  CHILD ASPIRIN 81 mg chewable tablet CHEW ONE TABLET BY MOUTH EVERY DAY FOR 30 DAYS    PROMETHAZINE HCL (PHENERGAN PO) Take  by mouth as needed.  folic acid (FOLVITE) 1 mg tablet Take  by mouth four (4) times daily.  Lancets misc Test blood glucose 6x daily Dx Code: O24.414    insulin syringe-needle U-100 (BD INSULIN SYRINGE ULTRA-FINE) 1/2 mL 31 x 15/64\" syrg 1 Syringe by SubCUTAneous route Before breakfast, lunch, dinner and at bedtime.  traZODone (DESYREL) 50 mg tablet Take 25 mg by mouth as needed for Sleep.  diclofenac potassium (CATAFLAM) 50 mg tablet Take 50 mg by mouth two (2) times a day.  methocarbamol (ROBAXIN) 500 mg tablet Take 500 mg by mouth every six (6) hours as needed.  MEDROXYPROGESTERONE ACETATE (DEPO-PROVERA IM) by IntraMUSCular route.     tizanidine (ZANAFLEX) 2 mg tablet Take 1 tablet by mouth three (3) times daily as needed.  DESOGESTREL-ETHINYL ESTRADIOL (APRI PO) Take  by mouth.  albuterol (PROAIR HFA) 90 mcg/Actuation inhaler Take 1 Puff by inhalation every four (4) hours as needed. No current facility-administered medications for this visit. Allergies   Allergen Reactions    Latex Rash    Other Food Itching     Califlower    Banana Itching and Nausea and Vomiting    Benadryl [Diphenhydramine Hcl] Rash    Children's Tylenol [Acetaminophen] Rash    Honey Itching     Itchy tounge    Kiwi Itching and Nausea and Vomiting    Lactose Nausea and Vomiting     Only milk is the issue    Peach (Prunus Persica) Itching and Nausea and Vomiting     Allergic reactions to raw peaches    Pineapple Itching and Nausea and Vomiting    Strawberry Itching and Nausea and Vomiting     Allergies to raw strawberries         Review of Systems:  -   - Eyes: no blurry vision no double vision  - Cardiovascular: no chest pain ,no palpitations  - Respiratory: no cough no shortness of breath  - Gastrointestinal: no dysphagia no  abdominal pain  - Musculoskeletal: no joint pains no  weakness  - Integumentary: no rashes  -   -     Physical Examination:   Blood pressure 100/62, pulse 88, temperature 98.4 °F (36.9 °C), temperature source Oral, resp. rate 16, height 5' (1.524 m), weight 186 lb 9.6 oz (84.6 kg), last menstrual period 01/22/2018, SpO2 99 %, unknown if currently breastfeeding. Estimated body mass index is 36.44 kg/(m^2) as calculated from the following:    Height as of this encounter: 5' (1.524 m). -   Weight as of this encounter: 186 lb 9.6 oz (84.6 kg).   - General: pleasant, no distress, good eye contact  - HEENT: no pallor, no periorbital edema, EOMI  - Neck: supple, no thyromegaly,   - Cardiovascular: regular, normal rate, normal S1 and S2  - Respiratory: clear to auscultation bilaterally  - Gastrointestinal: soft, gravid abdomen  - Musculoskeletal:  + edema  - Neurological: alert and oriented  - Psychiatric: normal mood and affect  - Skin: color, texture, turgor normal.     Diabetic foot exam: February 2018    Left:     Vibratory sensation normal    Filament test normal sensation with micro filament   Pulse DP: 1+    Deformities: None  Right:    Vibratory sensation normal   Filament test normal sensation with micro filament   Pulse DP: 1+   Deformities: None    Data Reviewed:     [] Glucose records reviewed. [] See glucose records for details (to be scanned). [] A1C  [] Reviewed labs    Lab Results   Component Value Date/Time    Hemoglobin A1c 8.0 (H) 11/07/2017 02:32 PM    Hemoglobin A1c 9.1 (H) 05/03/2016 03:52 PM    Hemoglobin A1c CANCELED 01/06/2016 12:06 PM    Glucose 363 (H) 11/07/2017 02:32 PM    Glucose 290 (H) 12/15/2013 06:00 AM    Glucose (POC) 215 (H) 01/05/2014 07:30 PM    Glucose  12/05/2016 03:10 PM    Microalb/Creat ratio (ug/mg creat.) 2.9 05/03/2016 03:52 PM    LDL,Direct 129 (H) 01/05/2015 02:18 PM    LDL, calculated 81 05/03/2016 03:52 PM    Creatinine (POC) 0.8 11/04/2014 03:12 PM    Creatinine 0.68 11/07/2017 02:32 PM      Lab Results   Component Value Date/Time    Cholesterol, total 169 05/03/2016 03:52 PM    HDL Cholesterol 74 05/03/2016 03:52 PM    LDL,Direct 129 (H) 01/05/2015 02:18 PM    LDL, calculated 81 05/03/2016 03:52 PM    Triglyceride 68 05/03/2016 03:52 PM    CHOL/HDL Ratio 2.6 12/15/2013 06:00 AM     Lab Results   Component Value Date/Time    ALT (SGPT) 9 11/07/2017 02:32 PM    AST (SGOT) 12 11/07/2017 02:32 PM    Alk.  phosphatase 125 (H) 11/07/2017 02:32 PM    Bilirubin, total 0.3 11/07/2017 02:32 PM     Lab Results   Component Value Date/Time    GFR est  11/07/2017 02:32 PM    GFR est non- 11/07/2017 02:32 PM    Creatinine (POC) 0.8 11/04/2014 03:12 PM    Creatinine 0.68 11/07/2017 02:32 PM    BUN 10 11/07/2017 02:32 PM    Sodium 139 11/07/2017 02:32 PM    Potassium 4.8 11/07/2017 02:32 PM    Chloride 98 11/07/2017 02:32 PM    CO2 24 11/07/2017 02:32 PM      Lab Results   Component Value Date/Time    TSH 0.815 11/07/2017 02:32 PM    Triiodothyronine (T3), free 2.3 04/02/2013 12:00 AM    T4, Free 1.33 01/28/2014 03:46 PM        Assessment/Plan:     No diagnosis found. 1. Type 1 Diabetes Mellitus   Lab Results   Component Value Date/Time    Hemoglobin A1c 8.0 (H) 11/07/2017 02:32 PM    Hemoglobin A1c (POC) 6.7 12/05/2016 03:10 PM   Not at goal  Discussed the basis for putting the carbs and taking insulin for the meals. Decrease basal rate to 50% while at work   On [employer] insulin pump   Could not get the continuous glucose monitor  Eye exam needed   - food choices are her main issue   May have to start her on metformin for insulin resistance given obesity       2 Obesity:Body mass index is 36.44 kg/(m^2). She seems to be on a high carbohydrate diet due to family situation ,[facility] and carb counting will definitely help her limit.     3 depression - stable          There are no Patient Instructions on file for this visit. Follow-up Disposition: Not on File    Thank you for allowing me to participate in the care of this patient.     Madelin Weeks MD

## 2018-02-19 NOTE — MR AVS SNAPSHOT
[address] [facility] [address] 
[phone] Patient: Yaima Vallecillo MRN: [id number] VCP:2/33/[id number] Visit Information Date & Time Provider Department Dept. Phone Encounter #  
 2/19/2018  2:45 PM Nena Alicia MD [facility] [phone] [id number] Follow-up Instructions Return in about 4 months (around 6/19/2018). Upcoming Health Maintenance Date Due  
 FOOT EXAM Q1 5/15/2005 HPV AGE 9Y-26Y (1 of 3 - Female 3 Dose Series) 5/15/2006 Pneumococcal 19-64 Medium Risk (1 of 1 - PPSV23) 5/15/2014 DTaP/Tdap/Td series (1 - Tdap) 5/15/2016 PAP AKA CERVICAL CYTOLOGY 5/15/2016 MICROALBUMIN Q1 5/3/2017 LIPID PANEL Q1 5/3/2017 Influenza Age 5 to Adult 8/1/2017 EYE EXAM RETINAL OR DILATED Q1 12/22/2017 HEMOGLOBIN A1C Q6M 5/7/2018 Allergies as of 2/19/2018  Review Complete On: 2/19/2018 By: Nena Alicia MD  
  
 Severity Noted Reaction Type Reactions Latex  12/20/2011    Rash Other Food  01/19/2015    Itching Shira Pretzel Banana  12/20/2011   Topical Itching, Nausea and Vomiting Benadryl [Diphenhydramine Hcl]  12/20/2011    Rash Children's Tylenol [Acetaminophen]  12/20/2011    Rash Honey  12/20/2011   Topical Itching Itchy tounge Kiwi  12/20/2011   Topical Itching, Nausea and Vomiting Lactose  12/20/2011   Intolerance Nausea and Vomiting Only milk is the issue Peach (Prunus Persica)  12/20/2011   Topical Itching, Nausea and Vomiting Allergic reactions to raw peaches Pineapple  12/20/2011   Topical Itching, Nausea and Vomiting Strawberry  12/20/2011   Topical Itching, Nausea and Vomiting Allergies to raw strawberries Current Immunizations  Never Reviewed No immunizations on file. Not reviewed this visit You Were Diagnosed With   
  
 Codes Comments Hyperglycemia due to type 1 diabetes mellitus ([facility]    -  Primary ICD-10-CM: E10.65 ICD-9-CM: 250.01 Vitals BP Pulse Temp Resp Height(growth percentile) Weight(growth percentile) 100/62 (BP 1 Location: Left arm, BP Patient Position: Sitting) 88 98.4 °F (36.9 °C) (Oral) 16 5' (1.524 m) 186 lb 9.6 oz (84.6 kg) LMP SpO2 Breastfeeding? BMI OB Status Smoking Status 01/22/2018 99% Unknown 36.44 kg/m2 Having regular periods Current Some Day Smoker Vitals History BMI and BSA Data Body Mass Index Body Surface Area  
 36.44 kg/m 2 1.89 m 2 Preferred Pharmacy Pharmacy Name Phone [id number] [phone] Your Updated Medication List  
  
   
This list is accurate as of: 2/19/18  3:36 PM.  Always use your most recent med list.  
  
  
  
  
 APRI PO Take  by mouth. Blood-Glucose Meter Misc Commonly known as:  CONTOUR NEXT METER Test blood glucose 4x daily  
  
 busPIRone 10 mg tablet Commonly known as:  BUSPAR Take 10 mg by mouth two (2) times a day. CHILD ASPIRIN 81 mg chewable tablet Generic drug:  aspirin CHEW ONE TABLET BY MOUTH EVERY DAY FOR 30 DAYS CONTOUR NEXT STRIPS strip Generic drug:  glucose blood VI test strips TEST BLOOD GLUCOSE 6 TIMES DAILY DX CODE: O24.414 DEPO-PROVERA IM  
by IntraMUSCular route. diclofenac potassium 50 mg tablet Commonly known as:  CATAFLAM  
Take 50 mg by mouth two (2) times a day. ferrous sulfate 325 mg (65 mg iron) EC tablet Commonly known as:  IRON Take 1 tablet by mouthdaily (with meals),  
  
 folic acid 1 mg tablet Commonly known as:  Google Take  by mouth four (4) times daily. GLUCAGON EMERGENCY KIT (HUMAN) 1 mg injection Generic drug:  glucagon INJECT 1 ML INTRAMUSCULARLY ONCE FOR 1 DOSE. * insulin aspart U-100 100 unit/mL injection Commonly known as:  NovoLOG U-100 Insulin aspart Use with insulin pump Max units daily: 100  
  
 * insulin aspart U-100 100 unit/mL injection Commonly known as:  NovoLOG U-100 Insulin aspart Inject 1 unit per 10 grams of carbs w/ correction scale max daily dose of 100 units  
  
 insulin glargine 100 unit/mL injection Commonly known as:  LANTUS U-100 INSULIN Inject 18 units in QHS  
  
 insulin syringe-needle U-100 1/2 mL 31 gauge x 15/64\" Syrg Commonly known as:  BD INSULIN SYRINGE ULTRA-FINE  
1 Syringe by SubCUTAneous route Before breakfast, lunch, dinner and at bedtime. Lancets Misc Test blood glucose 6x daily Dx Code: O24.414  
  
 loratadine 10 mg tablet Commonly known as:  Rosalva Bones Take 10 mg by mouth daily. THALIA 250 mg/mL (1 mL) Oil injection Generic drug:  HYDROXYprogesterone (PF)  
every seven (7) days. methocarbamol 500 mg tablet Commonly known as:  ROBAXIN Take 500 mg by mouth every six (6) hours as needed. omeprazole 20 mg capsule Commonly known as:  PRILOSEC Take 10 mg by mouth daily. Not sure of dose PHENERGAN PO Take  by mouth as needed. * VENTOLIN HFA 90 mcg/actuation inhaler Generic drug:  albuterol Take  by inhalation. * PROAIR HFA 90 mcg/actuation inhaler Generic drug:  albuterol Take 1 Puff by inhalation every four (4) hours as needed. sertraline 100 mg tablet Commonly known as:  ZOLOFT Take 1 Tab by mouth daily. Indications: DEPRESSION  
  
 tiZANidine 2 mg tablet Commonly known as:  Salo Hargrove Take 1 tablet by mouth three (3) times daily as needed. traZODone 50 mg tablet Commonly known as:  Belia Gandhiman Take 25 mg by mouth as needed for Sleep.  
  
 zinc 50 mg Tab tablet Take  by mouth daily. * Notice: This list has 4 medication(s) that are the same as other medications prescribed for you. Read the directions carefully, and ask your doctor or other care provider to review them with you. We Performed the Following MICROALBUMIN, UR, RAND W/ MICROALBUMIN/CREA RATIO [id number] CPT(R)] Follow-up Instructions Return in about 4 months (around 6/19/2018). Introducing [facility]! Dear Svetlana Parson: 
Thank you for requesting a [employer] account. Our records indicate that you already have an active Snapdeal account. You can access your account anytime at [email or web address] Did you know that you can access your hospital and ER discharge instructions at any time in Snapdeal? You can also review all of your test results from your hospital stay or ER visit. Additional Information If you have questions, please visit the Frequently Asked Questions section of the Snapdeal website at [email or web address] Remember, Snapdeal is NOT to be used for urgent needs. For medical emergencies, dial 911. Now available from your iPhone and Android! Please provide this summary of care documentation to your next provider. Your primary care clinician is listed as NONE. If you have any questions after today's visit, please call [phone].

## 2018-02-19 NOTE — PROGRESS NOTES
Chelsea Olivera is a 25 y.o. female here for   Chief Complaint   Patient presents with    Diabetes       Functional glucose monitor and record keeping system? -   Eye exam within last year? -   Foot exam within last year? -     1. Have you been to the ER, urgent care clinic since your last visit? Hospitalized since your last visit? -    2. Have you seen or consulted any other health care providers outside of the [facility] since your last visit?   Include any pap smears or colon screening.-      Lab Results   Component Value Date/Time    Hemoglobin A1c 8.0 (H) 11/07/2017 02:32 PM    Hemoglobin A1c (POC) 6.7 12/05/2016 03:10 PM       Wt Readings from Last 3 Encounters:   02/19/18 186 lb 9.6 oz (84.6 kg)   11/07/17 188 lb 14.4 oz (85.7 kg)   09/05/17 190 lb 12.8 oz (86.5 kg)     Temp Readings from Last 3 Encounters:   02/19/18 98.4 °F (36.9 °C) (Oral)   11/07/17 97.7 °F (36.5 °C) (Oral)   09/05/17 97.2 °F (36.2 °C) (Oral)     BP Readings from Last 3 Encounters:   02/19/18 100/62   11/07/17 108/73   09/05/17 115/75     Pulse Readings from Last 3 Encounters:   02/19/18 88   11/07/17 83   09/05/17 90     Order placed for pt,  per Verbal Order with read back from Dr Jamal Suarez 2/19/2018

## 2018-02-20 LAB
ALBUMIN/CREAT UR: <4.4 (ref 0–30)
CREAT UR-MCNC: 68 MG/DL
MICROALBUMIN UR-MCNC: <3 UG/ML

## 2018-03-18 RX ORDER — GLUCAGON 1 MG
VIAL (EA) INJECTION
Qty: 1 KIT | Refills: 2 | Status: SHIPPED | OUTPATIENT
Start: 2018-03-18 | End: 2018-09-08 | Stop reason: SDUPTHER

## 2018-10-10 DIAGNOSIS — E10.65 HYPERGLYCEMIA DUE TO TYPE 1 DIABETES MELLITUS (HCC): ICD-10-CM

## 2018-10-10 RX ORDER — INSULIN ASPART 100 [IU]/ML
INJECTION, SOLUTION INTRAVENOUS; SUBCUTANEOUS
Qty: 40 ML | Refills: 6 | Status: SHIPPED | OUTPATIENT
Start: 2018-10-10 | End: 2019-07-15 | Stop reason: SDUPTHER

## 2018-12-28 RX ORDER — BLOOD SUGAR DIAGNOSTIC
STRIP MISCELLANEOUS
Qty: 200 STRIP | Refills: 9 | Status: SHIPPED | OUTPATIENT
Start: 2018-12-28 | End: 2019-02-19 | Stop reason: SDUPTHER

## 2019-02-19 ENCOUNTER — TELEPHONE (OUTPATIENT)
Dept: ENDOCRINOLOGY | Age: 24
End: 2019-02-19

## 2019-02-19 DIAGNOSIS — E10.65 HYPERGLYCEMIA DUE TO TYPE 1 DIABETES MELLITUS (HCC): Primary | ICD-10-CM

## 2019-02-19 NOTE — TELEPHONE ENCOUNTER
----- Message from Soniya Hutchinson sent at 2/19/2019  9:41 AM EST -----  Regarding: Dr Grajeda/rx refill  Pt (p) [phone], requesting rx refill for her test strips, for her [facility] pharmacy ,[phone]., pt said she does not have any more left.

## 2019-03-29 ENCOUNTER — OFFICE VISIT (OUTPATIENT)
Dept: ENDOCRINOLOGY | Age: 24
End: 2019-03-29

## 2019-03-29 ENCOUNTER — OFFICE VISIT (OUTPATIENT)
Dept: OBGYN CLINIC | Age: 24
End: 2019-03-29

## 2019-03-29 VITALS
HEIGHT: 60 IN | DIASTOLIC BLOOD PRESSURE: 60 MMHG | WEIGHT: 187.4 LBS | BODY MASS INDEX: 36.79 KG/M2 | SYSTOLIC BLOOD PRESSURE: 100 MMHG

## 2019-03-29 VITALS
DIASTOLIC BLOOD PRESSURE: 64 MMHG | RESPIRATION RATE: 14 BRPM | HEIGHT: 60 IN | WEIGHT: 184 LBS | BODY MASS INDEX: 36.12 KG/M2 | OXYGEN SATURATION: 100 % | SYSTOLIC BLOOD PRESSURE: 89 MMHG | HEART RATE: 90 BPM | TEMPERATURE: 97.7 F

## 2019-03-29 DIAGNOSIS — Z87.51 HISTORY OF PRETERM DELIVERY: ICD-10-CM

## 2019-03-29 DIAGNOSIS — O24.911 DIABETES MELLITUS COMPLICATING PREGNANCY, ANTEPARTUM, FIRST TRIMESTER: Primary | ICD-10-CM

## 2019-03-29 DIAGNOSIS — O09.91 SUPERVISION OF HIGH RISK PREGNANCY IN FIRST TRIMESTER: Primary | ICD-10-CM

## 2019-03-29 DIAGNOSIS — Z3A.09 9 WEEKS GESTATION OF PREGNANCY: ICD-10-CM

## 2019-03-29 PROBLEM — E66.01 SEVERE OBESITY (HCC): Status: ACTIVE | Noted: 2019-03-29

## 2019-03-29 LAB
ANTIBODY SCREEN, EXTERNAL: NORMAL
CHLAMYDIA, EXTERNAL: NORMAL
GLUCOSE POC: 180 MG/DL
HBA1C MFR BLD HPLC: 8 %
HBSAG, EXTERNAL: NORMAL
HCT, EXTERNAL: 39.4
HGB, EXTERNAL: 12.8
HIV, EXTERNAL: NORMAL
N. GONORRHEA, EXTERNAL: NORMAL
PAP SMEAR, EXTERNAL: NORMAL
PLATELET CNT,   EXTERNAL: 229
RUBELLA, EXTERNAL: NORMAL
T. PALLIDUM, EXTERNAL: NORMAL
TSH SERPL-ACNC: 0.19 M[IU]/L
TYPE, ABO & RH, EXTERNAL: NORMAL
URINALYSIS, EXTERNAL: NORMAL

## 2019-03-29 NOTE — PATIENT INSTRUCTIONS

## 2019-03-29 NOTE — PROGRESS NOTES
Current pregnancy history:    Danie Tobias is a 21 y.o. female who presents for the evaluation of pregnancy. Patient's last menstrual period was 2019 (approximate). First child adopted away. Son lives with his father    LMP history:  The date of her LMP is uncertain. Her last menstrual period was normal and lasted for 4 to 5 days. A urine pregnancy test was positive weeks ago. She was not on the pill at conception. Based on her LMP, her EDC is 19 and her EGA is 8 weeks,0 days. Her menstrual cycles are regular and occur approximately every 28 days  and range from 3 to 5 days. The last menses lasted the usual number of days. Ultrasound data:  She had an ultrasound done by the ultrasound tech today which revealed a viable flor pregnancy with a gestational age of 10 weeks and 3 days giving an [address] of 10/29/189. Pregnancy symptoms:    Since her LMP she has experienced  urinary frequency, breast tenderness, and nausea. She has not been vomiting over the last few weeks. Associated signs and symptoms which she denies: dysuria, discharge, vaginal bleeding. She states she has gained weight:  Approximately 5 pounds over the last few weeks. Relevant past pregnancy history:   She has the followiing pregnancy history: Her last pregnancy was complicated by delivering 6 wks early and per patient delivered 9 weeks early with G1. She has no history of  delivery. Relevant past medical history:(relevant to this pregnancy): Manic depression, Asbergers, Bipolar, Type 1 DM---sees Dr. Lilibeth Pedroza   Pap/Occupational history:  Last pap smear:  Patient does not remember Results: Normal      Her occupation is: retail work    Substance history: negative for alcohol and street drugs. Positive for tobacco.  Exposure history: There is/are no indoor cat/s in the home. The patient was instructed to not change the cat litter.    She admits close contact with children on a regular basis.   She has had chicken pox or the vaccine in the past.   Patient denies issues with domestic violence. Genetic Screening/Teratology Counseling: (Includes patient, baby's father, or anyone in either family with:)  3.  Patient's age >/= [address] at [facility]?-- no  .   2. Thalassemia ([facility], [place], [address], or  background): MCV<80?--no.     3.  Neural tube defect (meningomyelocele, spina bifida, anencephaly)?--no.   4.  Congenital heart defect?--no.  5.  Down syndrome?--no.   6.  Demond-Sachs ([place], [place])?--no.   7.  Canavan's Disease?--no.   8.  Familial Dysautonomia?--no.   9.  Sickle cell disease or trait ()? --no   The patient has not been tested for sickle trait  10. Hemophilia or other blood disorders?--no. 11.  Muscular dystrophy?--no. 12.  Cystic fibrosis?--no. 13.  Hoxie's Chorea?--no. 14.  Mental retardation/autism (if yes was person tested for Fragile X)?--no. 15.  Other inherited genetic or chromosomal disorder?--no. 12.  Maternal metabolic disorder (DM, PKU, etc)?--no. 17.  Patient or FOB with a child with a birth defect not listed above?--no.  17a. Patient or FOB with a birth defect themselves?--no. 18.  Recurrent pregnancy loss, or stillbirth?--no. 19.  Any medications since LMP other than prenatal vitamins (include vitamins, supplements, OTC meds, drugs, alcohol)? --Buspar, Sertraline, Novolog. 20.  Any other genetic/environmental exposure to discuss?--no. Infection History:  1. Lives with someone with TB or TB exposed?--no.   2.  Patient or partner has history of genital herpes?--no.  3.  Rash or viral illness since LMP?--no.    4.  History of STD (GC, CT, HPV, syphilis, HIV)? --no   5.  Other: OTHER?    OB History    Para Term  AB Living   3 2 0 2 0 1   SAB TAB Ectopic Molar Multiple Live Births   0 0 0 0 0 1      # Outcome Date GA Lbr Bryan/2nd Weight Sex Delivery Anes PTL Lv   3 Current            2  17 34w0d  9 lb (4.082 kg) M CS-Unspec  N    1  14 31w0d  6 lb (2.722 kg) F Vag-Spont   ERIC      Birth Comments: System Generated. Please review and update pregnancy details. Past Medical History:   Diagnosis Date    Asperger's syndrome     Asthma     Bipolar 1 disorder (HCC)     Depression     Diabetes (HCC)     E-coli UTI     Endocrine disease     diabetes    FHx: allergies     Manic depression (HCC)     Vision decreased      Past Surgical History:   Procedure Laterality Date    HX ANKLE FRACTURE TX Right 2018     Social History     Occupational History    Not on file   Tobacco Use    Smoking status: Current Some Day Smoker    Smokeless tobacco: Never Used   Substance and Sexual Activity    Alcohol use: No    Drug use: No    Sexual activity: Yes     Partners: Male     Birth control/protection: None     Family History   Problem Relation Age of Onset    Asthma Mother     Diabetes Mother     Asthma Brother     Other Brother         autistic    Hypertension Maternal Grandmother     Hypertension Maternal Grandfather        Allergies   Allergen Reactions    Latex Rash    Other Food Itching     Califlower    Banana Itching and Nausea and Vomiting    Benadryl [Diphenhydramine Hcl] Rash    Children's Tylenol [Acetaminophen] Rash    Honey Itching     Itchy tounge    Kiwi Itching and Nausea and Vomiting    Lactose Nausea and Vomiting     Only milk is the issue    Peach (Prunus Persica) Itching and Nausea and Vomiting     Allergic reactions to raw peaches    Pineapple Itching and Nausea and Vomiting    Strawberry Itching and Nausea and Vomiting     Allergies to raw strawberries     Prior to Admission medications    Medication Sig Start Date End Date Taking? Authorizing Provider   PNV No12-Iron-FA-DSS-OM-3 29 mg iron-1 mg -50 mg CPKD Take  by mouth.    Yes Provider, Historical   glucose blood VI test strips (CONTOUR NEXT TEST STRIPS) strip TEST BLOOD GLUCOSE 6 TIMES DAILY DX CODE: E10.65 2/19/19  Yes León Michael MD   NOVOLOG U-100 INSULIN ASPART 100 unit/mL injection INJECT 1 UNIT PER 10 GRAMS OF CARBS W/ CORRECTION SCALE MAX DAILY DOSE  UNITS 10/10/18  Yes León Michael MD   GLUCAGON EMERGENCY KIT, HUMAN, 1 mg injection INJECT 1 ML INTRAMUSCULARLY ONCE FOR 1 DOSE. 9/9/18  Yes Justin Hays MD   albuterol (VENTOLIN HFA) 90 mcg/actuation inhaler Take  by inhalation. Yes Provider, Historical   ferrous sulfate (IRON) 325 mg (65 mg iron) EC tablet Take 1 tablet by mouthdaily (with meals), 7/28/14  Yes Provider, Historical   insulin glargine (LANTUS) 100 unit/mL injection Inject 18 units in QHS 9/5/17  Yes León Michael MD   insulin aspart (NOVOLOG) 100 unit/mL injection Use with insulin pump Max units daily: 100 9/6/16  Yes León Michael MD   busPIRone (BUSPAR) 10 mg tablet Take 10 mg by mouth two (2) times a day. Yes Provider, Historical   Blood-Glucose Meter (CONTOUR NEXT METER) misc Test blood glucose 4x daily 3/10/16  Yes León Michael MD   sertraline (ZOLOFT) 100 mg tablet Take 1 Tab by mouth daily. Indications: DEPRESSION 12/16/13  Yes Stacy Patrick, NP   loratadine (CLARITIN) 10 mg tablet Take 10 mg by mouth daily. Yes Provider, Historical   zinc 50 mg tab tablet Take  by mouth daily. Provider, Historical   omeprazole (PRILOSEC) 20 mg capsule Take 10 mg by mouth daily.  Not sure of dose    Provider, Historical        Review of Systems: History obtained from the patient  Constitutional: negative for weight loss, fever, night sweats  HEENT: negative for hearing loss, earache, congestion, snoring, sorethroat  CV: negative for chest pain, palpitations, edema  Resp: negative for cough, shortness of breath, wheezing  Breast: negative for breast lumps, nipple discharge, galactorrhea  GI: negative for change in bowel habits, abdominal pain, black or bloody stools  : negative for frequency, dysuria, hematuria, vaginal discharge  MSK: negative for back pain, joint pain, muscle pain  Skin: negative for itching, rash, hives  Neuro: negative for dizziness, headache, confusion, weakness  Psych: negative for anxiety, depression, change in mood  Heme/lymph: negative for bleeding, bruising, pallor    Objective:  Visit Vitals  /60 (BP 1 Location: Right arm, BP Patient Position: Sitting)   Ht 5' (1.524 m)   Wt 187 lb 6.4 oz (85 kg)   LMP 02/01/2019 (Approximate)   Breastfeeding?  No   BMI 36.60 kg/m²       Physical Exam:   PHYSICAL EXAMINATION    Constitutional  · Appearance: well-nourished, well developed, alert, in no acute distress    HENT  · Head  · Face: appears normal  · Eyes: appear normal  · Ears: normal  · Mouth: normal  · Lips: no lesions    Neck  · Inspection/Palpation: normal appearance, no masses or tenderness  · Lymph Nodes: no lymphadenopathy present  · Thyroid: gland size normal, nontender, no nodules or masses present on palpation    Chest  · Respiratory Effort: breathing unlabored  · Auscultation: normal breath sounds    Cardiovascular  · Heart:  · Auscultation: regular rate and rhythm without murmur    Breasts  · Inspection of Breasts: breasts symmetrical, no skin changes, no discharge present, nipple appearance normal, no skin retraction present  · Palpation of Breasts and Axillae: no masses present on palpation, no breast tenderness  · Axillary Lymph Nodes: no lymphadenopathy present    Gastrointestinal  · Abdominal Examination: abdomen non-tender to palpation, normal bowel sounds, no masses present  · Liver and spleen: no hepatomegaly present, spleen not palpable  · Hernias: no hernias identified    Genitourinary  · External Genitalia: normal appearance for age, no discharge present, no tenderness present, no inflammatory lesions present, no masses present, no atrophy present  · Vagina: normal vaginal vault without central or paravaginal defects, no discharge present, no inflammatory lesions present, no masses present  · Bladder: non-tender to palpation  · Urethra: appears normal  · Cervix: normal   · Uterus: enlarged, normal shape, soft  · Adnexa: no adnexal tenderness present, no adnexal masses present  · Perineum: perineum within normal limits, no evidence of trauma, no rashes or skin lesions present  · Anus: anus within normal limits, no hemorrhoids present  · Inguinal Lymph Nodes: no lymphadenopathy present    Skin  · General Inspection: no rash, no lesions identified    Neurologic/Psychiatric  · Mental Status:  · Orientation: grossly oriented to person, place and time  · Mood and Affect: mood normal, affect appropriate    Assessment:   Intrauterine pregnancy with the following problems identified:   10/29/2019 by [address] today - unsure LMP  Hx of LTCS - need records  G1  32 weeks - [facility] PPROM 2014 - induced at 34 weeks  G2 34 weeks - 9# PTL/distress -  [facility] [place]  Last DKA age 16  Asbergers  Declines flu vaccine - because gets DKA  Bipolar - doctor in [place]  [place] candidate - did last pregnancy  Baby ASA at 12 weeks  24 hr urine  NIPTS/[facility] next visit  MFM  [facility]        Plan:     Offered CF testing, CVS, Nuchal Translucency, MSAFP, amnio, and discussed NIPT  Course of pregnancy discussed including visit schedule, routine U/S, glucola testing, etc.  Avoid alcoholic beverages and illicit/recreational drugs use  Take prenatal vitamins or folic acid daily. Hospital and practice style discussed with coverage system. Discussed nutrition, toxoplasmosis precautions, sexual activity, exercise, need for influenza vaccine, environmental and work hazards, travel advice, screen for domestic violence, need for seat belts. Discussed seafood, unpasteurized dairy products, deli meat, artificial sweeteners, and caffeine. Information on prenatal classes/breastfeeding given. Information on circumcision given  Patient encouraged not to smoke. Discussed current prescription drug use.  Given medication list.  Discussed the use of over the counter medications and chemicals. Route of delivery discussed, including risks, benefits, and alternatives of  versus repeat LTCS. Pt understands risk of hemorrhage during pregnancy and post delivery and would accept blood products if necessary in life-threatening emergencies      Handouts given to pt.

## 2019-03-29 NOTE — PROGRESS NOTES
Krishan Herrera is a 21 y.o. female here for   Chief Complaint   Patient presents with    Diabetes    Diabetic Foot Exam       Functional glucose monitor and record keeping system? - yes  Eye exam within last year? - on file   Foot exam within last year? - due    1. Have you been to the ER, urgent care clinic since your last visit? Hospitalized since your last visit? -8/8/18 for [facility]    2. Have you seen or consulted any other health care providers outside of the [address] [place] [address] since your last visit? Include any pap smears or colon screening. -PCP

## 2019-03-29 NOTE — PROGRESS NOTES
[facility]               Juan Miguel Fenton MD        [address] 78 [phone] Fax [phone]           Patient Information  Date:4/1/2019  Name : Lacy Chapman 21 y.o.     YOB: 1995         Referred by: None       Chief Complaint   Patient presents with    Diabetes    Diabetic Foot Exam       History of Present Illness: Lacy Chapman is a 21 y.o. female here for follow up         She is  8 weeks pregnant, she has 2 children, the first child was taken away by , second child is with the baby's father  She lives with her boyfriend now  No hospitalization with DKA recently  She works at [employer]  She has changed the pump settings due to hyperglycemia as a result of pregnancy, reports hypoglycemia early in the morning, I do not have documentation of that      Cannot afford healthy food as she has to feed her brother and boyfriend , brother is autistic      She was diagnosed with type 1 diabetes mellitus at age 10. Prior hx    She was seen by Dr. Madelaine Arzola, pediatric endocrinologist . Meg Spann took her baby  and she is here with Diabetic Service Dog. She has seen several endocrinologists . 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 Before she had the Diabetic Service Dog, she had multiple hospitalizations.               Wt Readings from Last 3 Encounters:   03/29/19 187 lb 6.4 oz (85 kg)   03/29/19 184 lb (83.5 kg)   02/19/18 186 lb 9.6 oz (84.6 kg)       BP Readings from Last 3 Encounters:   03/29/19 100/60   03/29/19 (!) 89/64   02/19/18 100/62           Past Medical History:   Diagnosis Date    Asperger's syndrome     Asthma     Bipolar 1 disorder ([address]     Depression     Diabetes [facility]     E-coli UTI     Endocrine disease     diabetes    FHx: allergies     Manic depression ([address]     Vision decreased      Current Outpatient Medications Medication Sig    PNV No12-Iron-FA-DSS-OM-3 29 mg iron-1 mg -50 mg CPKD Take  by mouth.  glucose blood VI test strips (CONTOUR NEXT TEST STRIPS) strip TEST BLOOD GLUCOSE 6 TIMES DAILY DX CODE: E10.65    GLUCAGON EMERGENCY KIT, HUMAN, 1 mg injection INJECT 1 ML INTRAMUSCULARLY ONCE FOR 1 DOSE.  albuterol (VENTOLIN HFA) 90 mcg/actuation inhaler Take  by inhalation.  ferrous sulfate (IRON) 325 mg (65 mg iron) EC tablet Take 1 tablet by mouthdaily (with meals),    insulin aspart (NOVOLOG) 100 unit/mL injection Use with insulin pump Max units daily: 100    busPIRone (BUSPAR) 10 mg tablet Take 10 mg by mouth two (2) times a day.  Blood-Glucose Meter (CONTOUR NEXT METER) misc Test blood glucose 4x daily    sertraline (ZOLOFT) 100 mg tablet Take 1 Tab by mouth daily. Indications: DEPRESSION    omeprazole (PRILOSEC) 20 mg capsule Take 10 mg by mouth daily. Not sure of dose    loratadine (CLARITIN) 10 mg tablet Take 10 mg by mouth daily.  NOVOLOG U-100 INSULIN ASPART 100 unit/mL injection INJECT 1 UNIT PER 10 GRAMS OF CARBS W/ CORRECTION SCALE MAX DAILY DOSE  UNITS    zinc 50 mg tab tablet Take  by mouth daily.  insulin glargine (LANTUS) 100 unit/mL injection Inject 18 units in QHS     No current facility-administered medications for this visit.       Allergies   Allergen Reactions    Latex Rash    Other Food Itching     Califlower    Banana Itching and Nausea and Vomiting    Benadryl [Diphenhydramine Hcl] Rash    Children's Tylenol [Acetaminophen] Rash    Honey Itching     Itchy tounge    Kiwi Itching and Nausea and Vomiting    Lactose Nausea and Vomiting     Only milk is the issue    Peach (Prunus Persica) Itching and Nausea and Vomiting     Allergic reactions to raw peaches    Pineapple Itching and Nausea and Vomiting    Strawberry Itching and Nausea and Vomiting     Allergies to raw strawberries         Review of Systems:  -   - Eyes: no blurry vision no double vision  - Cardiovascular: no chest pain ,no palpitations  - Respiratory: no cough no shortness of breath  - Gastrointestinal: no dysphagia no  abdominal pain  - Musculoskeletal: no joint pains no  weakness  - Integumentary: no rashes  -   -     Physical Examination:   Blood pressure (!) 89/64, pulse 90, temperature 97.7 °F (36.5 °C), temperature source Oral, resp. rate 14, height 5' (1.524 m), weight 184 lb (83.5 kg), last menstrual period 02/01/2019, SpO2 100 %, not currently breastfeeding. Estimated body mass index is 35.94 kg/m² as calculated from the following:    Height as of this encounter: 5' (1.524 m). -   Weight as of this encounter: 184 lb (83.5 kg).   - General: pleasant, no distress, good eye contact  - HEENT: no pallor, no periorbital edema, EOMI  - Neck: supple, no thyromegaly,   - Cardiovascular: regular, normal rate, normal S1 and S2  - Respiratory: clear to auscultation bilaterally  - Gastrointestinal: soft, gravid abdomen  - Musculoskeletal:  + edema  - Neurological: alert and oriented  - Psychiatric: normal mood and affect  - Skin: color, texture, turgor normal.     Diabetic foot exam: March 2019    Left:     Vibratory sensation normal    Filament test normal sensation with micro filament   Pulse DP: 1+    Deformities: None  Right:    Vibratory sensation normal   Filament test normal sensation with micro filament   Pulse DP: 1+   Deformities: None    Data Reviewed:       Lab Results   Component Value Date/Time    Hemoglobin A1c 8.1 (H) 03/29/2019 04:23 PM    Hemoglobin A1c 8.0 (H) 11/07/2017 02:32 PM    Hemoglobin A1c 9.1 (H) 05/03/2016 03:52 PM    Glucose 363 (H) 11/07/2017 02:32 PM    Glucose 290 (H) 12/15/2013 06:00 AM    Glucose (POC) 215 (H) 01/05/2014 07:30 PM    Glucose  03/29/2019 11:45 AM    Microalb/Creat ratio (ug/mg creat.) <4.4 02/19/2018 04:11 PM    LDL,Direct 129 (H) 01/05/2015 02:18 PM    LDL, calculated 81 05/03/2016 03:52 PM    Creatinine (POC) 0.8 11/04/2014 03:12 PM Creatinine 0.68 11/07/2017 02:32 PM      Lab Results   Component Value Date/Time    Cholesterol, total 169 05/03/2016 03:52 PM    HDL Cholesterol 74 05/03/2016 03:52 PM    LDL,Direct 129 (H) 01/05/2015 02:18 PM    LDL, calculated 81 05/03/2016 03:52 PM    Triglyceride 68 05/03/2016 03:52 PM    CHOL/HDL Ratio 2.6 12/15/2013 06:00 AM     Lab Results   Component Value Date/Time    ALT (SGPT) 9 11/07/2017 02:32 PM    AST (SGOT) 12 11/07/2017 02:32 PM    Alk. phosphatase 125 (H) 11/07/2017 02:32 PM    Bilirubin, total 0.3 11/07/2017 02:32 PM     Lab Results   Component Value Date/Time    GFR est  11/07/2017 02:32 PM    GFR est non- 11/07/2017 02:32 PM    Creatinine (POC) 0.8 11/04/2014 03:12 PM    Creatinine 0.68 11/07/2017 02:32 PM    BUN 10 11/07/2017 02:32 PM    Sodium 139 11/07/2017 02:32 PM    Potassium 4.8 11/07/2017 02:32 PM    Chloride 98 11/07/2017 02:32 PM    CO2 24 11/07/2017 02:32 PM      Lab Results   Component Value Date/Time    TSH 0.187 (L) 03/29/2019 04:23 PM    Triiodothyronine (T3), free 2.3 04/02/2013 12:00 AM    T4, Free 1.33 01/28/2014 03:46 PM        Assessment/Plan:     1. Diabetes mellitus complicating pregnancy, antepartum, first trimester        1. Type 1 Diabetes Mellitus   Lab Results   Component Value Date/Time    Hemoglobin A1c 8.1 (H) 03/29/2019 04:23 PM    Hemoglobin A1c (POC) 8 03/29/2019 11:45 AM     8 weeks pregnant  Discussed the basis for putting the carbs and taking insulin for the meals. Decrease the basal rate at 3 AM based on her history, I do not see a hypoglycemia on the meter download   On Tandem insulin pump   Could not get the continuous glucose monitor  Eye exam needed   - food choices are her main issue   To meet with the dietitian, check blood glucose before and 2 hours after meal.  Carbohydrates less than 40 g per meal which is the hardest part       2 Obesity:Body mass index is 35.94 kg/m².   She seems to be on a high carbohydrate diet due to family situation ,getting help from county and carb counting will definitely help her limit.     3 depression - stable    Blood pressure low, improved with hydration      There are no Patient Instructions on file for this visit. Follow-up and Dispositions    · Return in about 2 weeks (around 4/12/2019). Thank you for allowing me to participate in the care of this patient. Miriam Penn MD    Patient verbalized understanding    Spent more than 40 minutes face-to-face with patient of which greater than 50% of the visit was spent in counseling, importance of the diet, activity, long-term complications and the importance of checking the blood glucose to see the progress.

## 2019-03-29 NOTE — Clinical Note
4/1/19 Patient: Elisabeth Trimble YOB: 1995 Date of Visit: 3/29/2019 None None ([phone]) Patient Stated That They Have No Pcp 
VIA Dear None, Thank you for referring Ms. Diane Rivas to [address] [place] [address] for evaluation. My notes for this consultation are attached. If you have questions, please do not hesitate to call me. I look forward to following your patient along with you. Sincerely, Gerhard Mata MD

## 2019-03-31 LAB
ABO GROUP BLD: NORMAL
BLD GP AB SCN SERPL QL: NEGATIVE
ERYTHROCYTE [DISTWIDTH] IN BLOOD BY AUTOMATED COUNT: 13.4 % (ref 12.3–15.4)
HBA1C MFR BLD: 8.1 % (ref 4.8–5.6)
HBV SURFACE AG SERPL QL IA: NEGATIVE
HCT VFR BLD AUTO: 39.4 % (ref 34–46.6)
HGB BLD-MCNC: 12.8 G/DL (ref 11.1–15.9)
HIV 1+2 AB+HIV1 P24 AG SERPL QL IA: NON REACTIVE
MCH RBC QN AUTO: 29.9 PG (ref 26.6–33)
MCHC RBC AUTO-ENTMCNC: 32.5 G/DL (ref 31.5–35.7)
MCV RBC AUTO: 92 FL (ref 79–97)
PLATELET # BLD AUTO: 229 X10E3/UL (ref 150–379)
RBC # BLD AUTO: 4.28 X10E6/UL (ref 3.77–5.28)
RH BLD: POSITIVE
RUBV IGG SERPL IA-ACNC: 1.2 INDEX
T PALLIDUM AB SER QL IA: NEGATIVE
TSH SERPL DL<=0.005 MIU/L-ACNC: 0.19 UIU/ML (ref 0.45–4.5)
WBC # BLD AUTO: 6.1 X10E3/UL (ref 3.4–10.8)

## 2019-04-02 LAB
BACTERIA UR CULT: NORMAL
C TRACH RRNA CVX QL NAA+PROBE: NEGATIVE
CYTOLOGIST CVX/VAG CYTO: NORMAL
CYTOLOGY CVX/VAG DOC THIN PREP: NORMAL
DX ICD CODE: NORMAL
LABCORP, 190119: NORMAL
Lab: NORMAL
N GONORRHOEA RRNA CVX QL NAA+PROBE: NEGATIVE
OTHER STN SPEC: NORMAL
PATH REPORT.FINAL DX SPEC: NORMAL
STAT OF ADQ CVX/VAG CYTO-IMP: NORMAL

## 2019-04-08 DIAGNOSIS — Z34.80 SUPERVISION OF OTHER NORMAL PREGNANCY: ICD-10-CM

## 2019-04-08 NOTE — PROGRESS NOTES
Created OB episode, recorded PN labs, added to prob list.  Pt seeing Dr. Hart Favors 4/26-sent mychart to see if she can come for just labs for [facility]. Then she is scheduled to see M 5/1.

## 2019-04-09 ENCOUNTER — TELEPHONE (OUTPATIENT)
Dept: OBGYN CLINIC | Age: 24
End: 2019-04-09

## 2019-04-09 NOTE — TELEPHONE ENCOUNTER
Patient is calling to clarify about her  appointment. She was told at the last visit it would be 3 weeks from that date. Then she got word that the visit with  would be coordinated with our appointment too. She is confused because the appointment was told to her 19 at 3 pm and then she got a mychart message for 9:30 am      She cannot do any morning appointments, she lives 2 hours out.       Please call her before 3 pm to clarify

## 2019-04-11 PROBLEM — K21.9 GERD (GASTROESOPHAGEAL REFLUX DISEASE): Status: ACTIVE | Noted: 2019-04-11

## 2019-04-11 PROBLEM — F84.0 AUTISM SPECTRUM DISORDER: Status: ACTIVE | Noted: 2019-04-11

## 2019-04-11 PROBLEM — R56.9 SEIZURES (HCC): Status: ACTIVE | Noted: 2019-04-11

## 2019-04-11 PROBLEM — F41.9 ANXIETY: Status: ACTIVE | Noted: 2019-04-11

## 2019-05-02 NOTE — PROGRESS NOTES
Ratna Henry is a 21 y.o. female here for   Chief Complaint   Patient presents with    Diabetes     pregnant       Functional glucose monitor and record keeping system? - yes  Eye exam within last year? - on file  Foot exam within last year? - on file    1. Have you been to the ER, urgent care clinic since your last visit? Hospitalized since your last visit? -no    2. Have you seen or consulted any other health care providers outside of the [address] [place] [address] since your last visit?   Include any pap smears or colon screening.-no

## 2019-05-03 ENCOUNTER — OFFICE VISIT (OUTPATIENT)
Dept: ENDOCRINOLOGY | Age: 24
End: 2019-05-03

## 2019-05-03 ENCOUNTER — ROUTINE PRENATAL (OUTPATIENT)
Dept: OBGYN CLINIC | Age: 24
End: 2019-05-03

## 2019-05-03 ENCOUNTER — HOSPITAL ENCOUNTER (OUTPATIENT)
Dept: PERINATAL CARE | Age: 24
Discharge: HOME | End: 2019-05-03
Attending: OBSTETRICS & GYNECOLOGY
Payer: MEDICAID

## 2019-05-03 VITALS
OXYGEN SATURATION: 99 % | HEIGHT: 60 IN | WEIGHT: 188 LBS | HEART RATE: 85 BPM | BODY MASS INDEX: 36.91 KG/M2 | TEMPERATURE: 97.8 F | RESPIRATION RATE: 16 BRPM | SYSTOLIC BLOOD PRESSURE: 106 MMHG | DIASTOLIC BLOOD PRESSURE: 65 MMHG

## 2019-05-03 VITALS — SYSTOLIC BLOOD PRESSURE: 118 MMHG | DIASTOLIC BLOOD PRESSURE: 70 MMHG | BODY MASS INDEX: 36.72 KG/M2 | WEIGHT: 188 LBS

## 2019-05-03 DIAGNOSIS — O24.019 TYPE 1 DIABETES MELLITUS COMPLICATING PREGNANCY, ANTEPARTUM: Primary | ICD-10-CM

## 2019-05-03 DIAGNOSIS — O09.90 SUPERVISION OF HIGH RISK PREGNANCY, ANTEPARTUM: Primary | ICD-10-CM

## 2019-05-03 DIAGNOSIS — Z46.81 INSULIN PUMP FITTING OR ADJUSTMENT: ICD-10-CM

## 2019-05-03 DIAGNOSIS — Z86.39 HISTORY OF THYROID DISEASE: ICD-10-CM

## 2019-05-03 DIAGNOSIS — Z3A.14 14 WEEKS GESTATION OF PREGNANCY: ICD-10-CM

## 2019-05-03 LAB
GLUCOSE POC: 232 MG/DL
HBA1C MFR BLD HPLC: 7.1 %

## 2019-05-03 PROCEDURE — 76815 OB US LIMITED FETUS(S): CPT | Performed by: OBSTETRICS & GYNECOLOGY

## 2019-05-03 PROCEDURE — 76817 TRANSVAGINAL US OBSTETRIC: CPT | Performed by: OBSTETRICS & GYNECOLOGY

## 2019-05-03 NOTE — PROGRESS NOTES
Saw MFM - nuchal mass possibly encephalocele  Will see MFM again in 2 weeks - plan to start Floweree  Has not been checking BS except fasting which are low.    Will see MFM in 2 weeks  NIPTS/[facility]/TSH today

## 2019-05-03 NOTE — PROGRESS NOTES
Problem List  Date Reviewed: 5/3/2019          Codes Class Noted    Anxiety ICD-10-CM: F41.9  ICD-9-CM: 300.00  4/11/2019        Autism spectrum disorder ICD-10-CM: F84.0  ICD-9-CM: 299.00  4/11/2019        GERD (gastroesophageal reflux disease) ICD-10-CM: K21.9  ICD-9-CM: 530.81  4/11/2019        Seizures ([facility] ICD-10-CM: R56.9  ICD-9-CM: 780.39  4/11/2019        Supervision of other normal pregnancy ICD-10-CM: Z34.80  ICD-9-CM: V22.1  4/8/2019    Overview Addendum 4/25/2019 10:37 AM by Mary Jo Madsen, April     Intrauterine pregnancy with the following problems identified:   10/29/2019 by [address] today - unsure LMP  Hx of LTCS - need records  G1  32 weeks - [facility] PPROM 2014 - induced at 34 weeks  G2 34 weeks - 9# PTL/distress -  [address]  Last DKA age 16  [place]  Declines flu vaccine - because gets DKA  Bipolar - doctor in [place]  [place] candidate - did last pregnancy  Baby ASA at 12 weeks  24 hr urine  NIPTS/[facility] next visit  MFM  [facility] from [facility] and [facility]---4/25/19 records rcv'd from [place] scanned in under media tab. [address] 87 of records shows pt was 5cm/60% effaced on 7/25/14 @ 31w4d, PPROM  7/14/14 and delivered 7/26/14--Pg[address] 270 of records  TSH 0.187 3/29/19 repeat at next visit  HgA1C 8.1 3/29/19             Severe obesity ([facility] ICD-10-CM: E66.01  ICD-9-CM: 278.01  3/29/2019        Insulin pump fitting or adjustment ICD-10-CM: Z46.81  ICD-9-CM: V53.91  12/5/2016        Encounter for long-term (current) use of insulin ([facility] ICD-10-CM: Z79.4  ICD-9-CM: V58.67  9/6/2016        BMI 33.0-33.9,adult ICD-10-CM: I00.28  ICD-9-CM: V85.33  10/29/2015        Obesity (BMI 30-39. 9) ICD-10-CM: E66.9  ICD-9-CM: 278.00  8/1/2015        Asperger's disorder ICD-10-CM: F84.5  ICD-9-CM: 299.80  4/17/2014        Asthma ICD-10-CM: J61.834  ICD-9-CM: 493.90  4/17/2014        Type 1 diabetes mellitus complicating pregnancy, antepartum ICD-10-CM: O24.019  ICD-9-CM: 648.03, 250.01  4/17/2014    Overview Signed 4/11/2019  3:11 PM by Helio Coburn LPN     Overview:    Insulin regimen 7-3-14  Insulin glargine: 33 units daily (no change)   Insulin aspart: 6-10 units with breakfast and lunch (no change); 15-20 units with dinner (increased)             Hyperglycemia due to type 1 diabetes mellitus ([facility] ICD-10-CM: E10.65  ICD-9-CM: 250.01  12/29/2012        Dehydration ICD-10-CM: E86.0  ICD-9-CM: 276.51  12/29/2012        DKA (diabetic ketoacidoses) ([facility] ICD-10-CM: E13.10  ICD-9-CM: 250.10  12/4/2012        DKA (diabetic ketoacidoses) ([facility] ICD-10-CM: E13.10  ICD-9-CM: 250.10  12/21/2011        PCOS (polycystic ovarian syndrome) ICD-10-CM: E28.2  ICD-9-CM: 256.4  12/21/2011        Depression ICD-10-CM: F32.9  ICD-9-CM: 737  12/21/2011

## 2019-05-03 NOTE — PROGRESS NOTES
[facility]               Scarlet Mckenna MD        [address] 78 [phone] Fax [phone]           Patient Information  Date:5/3/2019  Name : Natasha Del Cid 21 y.o.     YOB: 1995         Referred by: None       Chief Complaint   Patient presents with    Diabetes     pregnant       History of Present Illness: Natasha Del Cid is a 21 y.o. female here for follow up         She is currently pregnant, she has 2 children, the first child was taken away by , second child is with the baby's father  She lives with her boyfriend now  She is having hypoglycemia, no syncope  She works at [employer]  She has not met with the dietitian for reeducation      Cannot afford healthy food as she has to feed her brother and boyfriend , brother is autistic      She was diagnosed with type 1 diabetes mellitus at age 10. Prior hx    She was seen by Dr. Ramy Harrison, pediatric endocrinologist . Opal Norton took her baby  and she is here with Diabetic Service Dog. She has seen several endocrinologists . 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 Before she had the Diabetic Service Dog, she had multiple hospitalizations. Wt Readings from Last 3 Encounters:   05/03/19 188 lb (85.3 kg)   03/29/19 187 lb 6.4 oz (85 kg)   03/29/19 184 lb (83.5 kg)       BP Readings from Last 3 Encounters:   05/03/19 106/65   03/29/19 100/60   03/29/19 (!) 89/64           Past Medical History:   Diagnosis Date    Asperger's syndrome     Asthma     Bipolar 1 disorder ([facility]     Depression     Diabetes ([facility]     E-coli UTI     Endocrine disease     diabetes    FHx: allergies     Manic depression ([facility]     Vision decreased      Current Outpatient Medications   Medication Sig    PNV No12-Iron-FA-DSS-OM-3 29 mg iron-1 mg -50 mg CPKD Take  by mouth.     glucose blood VI test strips (CONTOUR NEXT TEST STRIPS) strip TEST BLOOD GLUCOSE 6 TIMES DAILY DX CODE: E10.65    GLUCAGON EMERGENCY KIT, HUMAN, 1 mg injection INJECT 1 ML INTRAMUSCULARLY ONCE FOR 1 DOSE.  albuterol (VENTOLIN HFA) 90 mcg/actuation inhaler Take  by inhalation.  ferrous sulfate (IRON) 325 mg (65 mg iron) EC tablet Take 1 tablet by mouthdaily (with meals),    insulin aspart (NOVOLOG) 100 unit/mL injection Use with insulin pump Max units daily: 100    busPIRone (BUSPAR) 10 mg tablet Take 10 mg by mouth two (2) times a day.  Blood-Glucose Meter (CONTOUR NEXT METER) misc Test blood glucose 4x daily    sertraline (ZOLOFT) 100 mg tablet Take 1 Tab by mouth daily. Indications: DEPRESSION    omeprazole (PRILOSEC) 20 mg capsule Take 10 mg by mouth daily. Not sure of dose    loratadine (CLARITIN) 10 mg tablet Take 10 mg by mouth daily.  NOVOLOG U-100 INSULIN ASPART 100 unit/mL injection INJECT 1 UNIT PER 10 GRAMS OF CARBS W/ CORRECTION SCALE MAX DAILY DOSE  UNITS    zinc 50 mg tab tablet Take  by mouth daily.  insulin glargine (LANTUS) 100 unit/mL injection Inject 18 units in QHS     No current facility-administered medications for this visit.       Allergies   Allergen Reactions    Latex Rash    Other Food Itching     Califlower    Banana Itching and Nausea and Vomiting    Benadryl [Diphenhydramine Hcl] Rash    Children's Tylenol [Acetaminophen] Rash    Honey Itching     Itchy tounge    Kiwi Itching and Nausea and Vomiting    Lactose Nausea and Vomiting     Only milk is the issue    Peach (Prunus Persica) Itching and Nausea and Vomiting     Allergic reactions to raw peaches    Pineapple Itching and Nausea and Vomiting    Strawberry Itching and Nausea and Vomiting     Allergies to raw strawberries         Review of Systems:  -   - Eyes: no blurry vision no double vision  - Cardiovascular: no chest pain ,no palpitations  - Respiratory: no cough no shortness of breath  - Gastrointestinal: no dysphagia no  abdominal pain  - Musculoskeletal: no joint pains no  weakness  - Integumentary: no rashes  -   -     Physical Examination:   Blood pressure 106/65, pulse 85, temperature 97.8 °F (36.6 °C), temperature source Oral, resp. rate 16, height 5' (1.524 m), weight 188 lb (85.3 kg), last menstrual period 02/01/2019, SpO2 99 %, not currently breastfeeding. Estimated body mass index is 36.72 kg/m² as calculated from the following:    Height as of this encounter: 5' (1.524 m). -   Weight as of this encounter: 188 lb (85.3 kg).   - General: pleasant, no distress, good eye contact  - HEENT: no pallor, no periorbital edema, EOMI  - Neck: supple, no thyromegaly,   - Cardiovascular: regular, normal rate, normal S1 and S2  - Respiratory: clear to auscultation bilaterally  - Gastrointestinal: soft, gravid abdomen  - Musculoskeletal:  + edema  - Neurological: alert and oriented  - Psychiatric: normal mood and affect  - Skin: color, texture, turgor normal.     Diabetic foot exam: March 2019    Left:     Vibratory sensation normal    Filament test normal sensation with micro filament   Pulse DP: 1+    Deformities: None  Right:    Vibratory sensation normal   Filament test normal sensation with micro filament   Pulse DP: 1+   Deformities: None    Data Reviewed:       Lab Results   Component Value Date/Time    Hemoglobin A1c 8.1 (H) 03/29/2019 04:23 PM    Hemoglobin A1c 8.0 (H) 11/07/2017 02:32 PM    Hemoglobin A1c 9.1 (H) 05/03/2016 03:52 PM    Glucose 363 (H) 11/07/2017 02:32 PM    Glucose 290 (H) 12/15/2013 06:00 AM    Glucose (POC) 215 (H) 01/05/2014 07:30 PM    Glucose  05/03/2019 12:06 PM    Microalb/Creat ratio (ug/mg creat.) <4.4 02/19/2018 04:11 PM    LDL,Direct 129 (H) 01/05/2015 02:18 PM    LDL, calculated 81 05/03/2016 03:52 PM    Creatinine (POC) 0.8 11/04/2014 03:12 PM    Creatinine 0.68 11/07/2017 02:32 PM      Lab Results   Component Value Date/Time    Cholesterol, total 169 05/03/2016 03:52 PM    HDL Cholesterol 74 05/03/2016 03:52 PM    LDL,Direct 129 (H) 01/05/2015 02:18 PM    LDL, calculated 81 05/03/2016 03:52 PM    Triglyceride 68 05/03/2016 03:52 PM    CHOL/HDL Ratio 2.6 12/15/2013 06:00 AM     Lab Results   Component Value Date/Time    ALT (SGPT) 9 11/07/2017 02:32 PM    AST (SGOT) 12 11/07/2017 02:32 PM    Alk. phosphatase 125 (H) 11/07/2017 02:32 PM    Bilirubin, total 0.3 11/07/2017 02:32 PM     Lab Results   Component Value Date/Time    GFR est  11/07/2017 02:32 PM    GFR est non- 11/07/2017 02:32 PM    Creatinine (POC) 0.8 11/04/2014 03:12 PM    Creatinine 0.68 11/07/2017 02:32 PM    BUN 10 11/07/2017 02:32 PM    Sodium 139 11/07/2017 02:32 PM    Potassium 4.8 11/07/2017 02:32 PM    Chloride 98 11/07/2017 02:32 PM    CO2 24 11/07/2017 02:32 PM      Lab Results   Component Value Date/Time    TSH 0.187 (L) 03/29/2019 04:23 PM    Triiodothyronine (T3), free 2.3 04/02/2013 12:00 AM    T4, Free 1.33 01/28/2014 03:46 PM    TSH, External 0.187 03/29/2019        Assessment/Plan:     1. Type 1 diabetes mellitus complicating pregnancy, antepartum        1. Type 1 Diabetes Mellitus   Lab Results   Component Value Date/Time    Hemoglobin A1c 8.1 (H) 03/29/2019 04:23 PM    Hemoglobin A1c (POC) 7.1 05/03/2019 12:06 PM     OSCAR 29 Oct 2019   Avoid skipping meals  Decrease the basal rate at 12 AM as well as 3, AM    On Tandem insulin pump   Could not get the continuous glucose monitor  Discussed about the eye exam  Pump download in a week and weekly pump download discussed with the patient so we can titrate the insulin   - food choices are her main issue   To meet with the dietitian, check blood glucose before and 2 hours after meal.  Carbohydrates less than 40 g per meal which is the hardest part       2 Obesity:Body mass index is 36.72 kg/m².   She seems to be on a high carbohydrate diet due to family situation ,getting help from [facility] and carb counting will definitely help her limit.     3 depression - stable          There are no Patient Instructions on file for this visit. Thank you for allowing me to participate in the care of this patient.     Jeryl Buerger, MD    Patient verbalized understanding

## 2019-05-03 NOTE — PATIENT INSTRUCTIONS

## 2019-05-04 LAB — TSH SERPL DL<=0.005 MIU/L-ACNC: 0.38 UIU/ML (ref 0.45–4.5)

## 2019-05-09 NOTE — PROGRESS NOTES
Dr Danisha Ramos,  I think TSH changes are still related to hCG surge and expect to improve over the next few weeks. I would monitor at this point.     Thank you

## 2019-05-17 DIAGNOSIS — O09.90 SUPERVISION OF HIGH RISK PREGNANCY, ANTEPARTUM: Primary | ICD-10-CM

## 2019-05-17 DIAGNOSIS — Z13.79 ENCOUNTER FOR OTHER SCREENING FOR GENETIC AND CHROMOSOMAL ANOMALIES: ICD-10-CM

## 2019-05-22 ENCOUNTER — ROUTINE PRENATAL (OUTPATIENT)
Dept: OBGYN CLINIC | Age: 24
End: 2019-05-22

## 2019-05-22 ENCOUNTER — HOSPITAL ENCOUNTER (OUTPATIENT)
Dept: PERINATAL CARE | Age: 24
Discharge: HOME | End: 2019-05-22
Attending: OBSTETRICS & GYNECOLOGY
Payer: MEDICAID

## 2019-05-22 VITALS — BODY MASS INDEX: 37.34 KG/M2 | WEIGHT: 191.2 LBS | SYSTOLIC BLOOD PRESSURE: 108 MMHG | DIASTOLIC BLOOD PRESSURE: 64 MMHG

## 2019-05-22 DIAGNOSIS — O09.90 SUPERVISION OF HIGH RISK PREGNANCY, ANTEPARTUM: Primary | ICD-10-CM

## 2019-05-22 DIAGNOSIS — N89.8 VAGINAL DISCHARGE: ICD-10-CM

## 2019-05-22 DIAGNOSIS — N89.8 VAGINAL ITCHING: ICD-10-CM

## 2019-05-22 DIAGNOSIS — Z3A.17 17 WEEKS GESTATION OF PREGNANCY: ICD-10-CM

## 2019-05-22 LAB — AFP, MATERNAL, EXTERNAL: POSITIVE

## 2019-05-22 PROCEDURE — 76815 OB US LIMITED FETUS(S): CPT | Performed by: OBSTETRICS & GYNECOLOGY

## 2019-05-22 NOTE — PROGRESS NOTES
Problem List  Date Reviewed: 5/4/2019          Codes Class Noted    Anxiety ICD-10-CM: F41.9  ICD-9-CM: 300.00  4/11/2019        Autism spectrum disorder ICD-10-CM: F84.0  ICD-9-CM: 299.00  4/11/2019        GERD (gastroesophageal reflux disease) ICD-10-CM: K21.9  ICD-9-CM: 530.81  4/11/2019        Seizures ([facility] ICD-10-CM: R56.9  ICD-9-CM: 780.39  4/11/2019        Supervision of other normal pregnancy ICD-10-CM: Z34.80  ICD-9-CM: V22.1  4/8/2019    Overview Addendum 5/20/2019 10:44 AM by Mirna Akins LPN     Intrauterine pregnancy with the following problems identified:   10/29/2019 by [address] today - unsure LMP  Hx of LTCS - need records  G1  32 weeks - [facility] P[facility] 2014 - induced at 34 weeks  G2 34 weeks - 9# PTL/distress -  [address]  Last DKA age 16  [place]  Declines flu vaccine - because gets DKA  Bipolar - doctor in [place]  [place] candidate - did last pregnancy  Baby ASA at 12 weeks  24 hr urine  NIPTS-normal female- Left message for pt to call office  MFM  [facility]---4/25/19 records rcv'd from [email or web address] scanned in under media tab. [address] of records shows pt was 5cm/60% effaced on 7/25/14 @ 31w4d, PPROM  7/14/14 and delivered 7/26/14--Pg[address] 270 of records  TSH 0.187 3/29/19-repeated 5/22/19  HgA1C 8.1 3/29/19               Severe obesity ([facility] ICD-10-CM: E66.01  ICD-9-CM: 278.01  3/29/2019        Insulin pump fitting or adjustment ICD-10-CM: Z46.81  ICD-9-CM: V53.91  12/5/2016        Encounter for long-term (current) use of insulin ([facility] ICD-10-CM: Z79.4  ICD-9-CM: V58.67  9/6/2016        BMI 33.0-33.9,adult ICD-10-CM: M59.65  ICD-9-CM: V85.33  10/29/2015        Obesity (BMI 30-39. 9) ICD-10-CM: E66.9  ICD-9-CM: 278.00  8/1/2015        Asperger's disorder ICD-10-CM: F84.5  ICD-9-CM: 299.80  4/17/2014        Asthma ICD-10-CM: T52.479  ICD-9-CM: 493.90  4/17/2014        Type 1 diabetes mellitus complicating pregnancy, antepartum ICD-10-CM: O24.019  ICD-9-CM: 648.03, 250.01  4/17/2014 Overview Signed 4/11/2019  3:11 PM by Quita Templeton LPN     Overview:    Insulin regimen 7-3-14  Insulin glargine: 33 units daily (no change)   Insulin aspart: 6-10 units with breakfast and lunch (no change); 15-20 units with dinner (increased)             Hyperglycemia due to type 1 diabetes mellitus ([facility] ICD-10-CM: E10.65  ICD-9-CM: 250.01  12/29/2012        Dehydration ICD-10-CM: E86.0  ICD-9-CM: 276.51  12/29/2012        DKA (diabetic ketoacidoses) ([facility] ICD-10-CM: E13.10  ICD-9-CM: 250.10  12/4/2012        DKA (diabetic ketoacidoses) ([facility] ICD-10-CM: E13.10  ICD-9-CM: 250.10  12/21/2011        PCOS (polycystic ovarian syndrome) ICD-10-CM: E28.2  ICD-9-CM: 256.4  12/21/2011        Depression ICD-10-CM: F32.9  ICD-9-CM: 321  12/21/2011

## 2019-05-22 NOTE — PROGRESS NOTES
AFP today. Sees MFM today  FBS today was 130!! NIPTS normal    Call from Dr. Ambika Montgomery. Looks like fetus has encephalocele. Pt obviously noncompliant with DM. Have offered and will rec Dysart. Pt requests to switch physicians. Will have her schedule to see someone closer to her home since she will need weekly injections. HAs MFM fu. She did not stay to see the genetic counselor today as requested. Per Dr. Ambika Montgomery, outcome for anomaly very poor.

## 2019-05-22 NOTE — PATIENT INSTRUCTIONS

## 2019-05-24 DIAGNOSIS — Z34.80 SUPERVISION OF OTHER NORMAL PREGNANCY: ICD-10-CM

## 2019-05-24 LAB
AFP ADJ MOM SERPL: 3.09
AFP INTERP SERPL-IMP: ABNORMAL
AFP INTERP SERPL-IMP: ABNORMAL
AFP SERPL-MCNC: 79.6 NG/ML
AGE AT DELIVERY: 24.4 YR
COMMENT, 018013: ABNORMAL
GA METHOD: ABNORMAL
GA: 17.1 WEEKS
IDDM PATIENT QL: YES
MULTIPLE PREGNANCY: NO
NEURAL TUBE DEFECT RISK FETUS: 30 %
RESULTS, 017004: ABNORMAL

## 2019-05-28 LAB
A VAGINAE DNA VAG QL NAA+PROBE: ABNORMAL SCORE
BVAB2 DNA VAG QL NAA+PROBE: ABNORMAL SCORE
C ALBICANS DNA VAG QL NAA+PROBE: NEGATIVE
C GLABRATA DNA VAG QL NAA+PROBE: POSITIVE
MEGA1 DNA VAG QL NAA+PROBE: ABNORMAL SCORE
T VAGINALIS RRNA SPEC QL NAA+PROBE: NEGATIVE

## 2019-05-28 RX ORDER — TERCONAZOLE 4 MG/G
1 CREAM VAGINAL
Qty: 45 G | Refills: 0 | Status: SHIPPED | OUTPATIENT
Start: 2019-05-28 | End: 2020-09-02

## 2019-06-11 ENCOUNTER — OFFICE VISIT (OUTPATIENT)
Dept: ENDOCRINOLOGY | Age: 24
End: 2019-06-11

## 2019-06-11 ENCOUNTER — HOSPITAL ENCOUNTER (OUTPATIENT)
Dept: PERINATAL CARE | Age: 24
Discharge: HOME | End: 2019-06-11
Attending: OBSTETRICS & GYNECOLOGY
Payer: MEDICAID

## 2019-06-11 VITALS
OXYGEN SATURATION: 98 % | HEIGHT: 60 IN | DIASTOLIC BLOOD PRESSURE: 62 MMHG | WEIGHT: 191 LBS | RESPIRATION RATE: 16 BRPM | SYSTOLIC BLOOD PRESSURE: 99 MMHG | TEMPERATURE: 96.9 F | HEART RATE: 79 BPM | BODY MASS INDEX: 37.5 KG/M2

## 2019-06-11 DIAGNOSIS — O24.019 TYPE 1 DIABETES MELLITUS COMPLICATING PREGNANCY, ANTEPARTUM: Primary | ICD-10-CM

## 2019-06-11 DIAGNOSIS — Z46.81 INSULIN PUMP FITTING OR ADJUSTMENT: ICD-10-CM

## 2019-06-11 LAB — HBA1C MFR BLD HPLC: 7 %

## 2019-06-11 PROCEDURE — 99204 OFFICE O/P NEW MOD 45 MIN: CPT | Performed by: OBSTETRICS & GYNECOLOGY

## 2019-06-11 PROCEDURE — 76811 OB US DETAILED SNGL FETUS: CPT | Performed by: OBSTETRICS & GYNECOLOGY

## 2019-06-11 NOTE — Clinical Note
6/11/19 Patient: Odessa Jimenez YOB: 1995 Date of Visit: 6/11/2019 None None (395) Patient Stated That They Have No Pcp 
VIA Dear None, Thank you for referring Ms. Heidi Burden to [address] [place] [address] for evaluation. My notes for this consultation are attached. If you have questions, please do not hesitate to call me. I look forward to following your patient along with you. Sincerely, Juan Moran MD

## 2019-06-11 NOTE — PROGRESS NOTES
Meeta Olivarez is a 25 y.o. female here for   Chief Complaint   Patient presents with    Diabetes       Functional glucose monitor and record keeping system? - yes  Eye exam within last year? - on file  Foot exam within last year? - on file    1. Have you been to the ER, urgent care clinic since your last visit? Hospitalized since your last visit? -no    2. Have you seen or consulted any other health care providers outside of the [address] [place] [address] since your last visit? Include any pap smears or colon screening. -OBGYN

## 2019-06-11 NOTE — PROGRESS NOTES
[facility] END[facility]               Leticia Laguerre MD        [address] [phone] Fax [phone]           Patient Information  Date:6/11/2019  Name : Lauryn Irwin 25 y. o.     YOB: 1995         Referred by: None       Chief Complaint   Patient presents with    Diabetes       History of Present Illness: Lauryn Irwin is a 25 y.o. female here for follow up     She is currently pregnant, she has 2 children, the first child was taken away by , second child is with the baby's father  She lives with her boyfriend now  25 weeks , she is checking only twice daily, not as recommended, taking bolus insulin for only one meal  Hypoglycemia has decreased after changing the basal last visit  She is not able to get the continuous glucose monitor   she works at [employer]  She has not met with the dietitian for reeducation      Cannot afford healthy food as she has to feed her brother and boyfriend , brother is autistic      She was diagnosed with type 1 diabetes mellitus at age 10. Prior hx    She was seen by Dr. Valdemar Rneee, pediatric endocrinologist . Trinaelydat Cuellar took her baby  and she is here with Diabetic Service Dog. She has seen several endocrinologists . 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 Before she had the Diabetic Service Dog, she had multiple hospitalizations.               Wt Readings from Last 3 Encounters:   06/11/19 191 lb (86.6 kg)   05/22/19 191 lb 3.2 oz (86.7 kg)   05/03/19 188 lb (85.3 kg)       BP Readings from Last 3 Encounters:   06/11/19 99/62   05/22/19 108/64   05/03/19 118/70           Past Medical History:   Diagnosis Date    Asperger's syndrome     Asthma     Bipolar 1 disorder ([address]     Depression     Diabetes ([facility]     E-coli UTI     Endocrine disease     diabetes    FHx: allergies     Manic depression ([facility]     Vision decreased      Current Outpatient Medications   Medication Sig    terconazole (TERAZOL 7) 0.4 % vaginal cream Insert 1 Applicator into vagina nightly.  PNV No12-Iron-FA-DSS-OM-3 29 mg iron-1 mg -50 mg CPKD Take  by mouth.  glucose blood VI test strips (CONTOUR NEXT TEST STRIPS) strip TEST BLOOD GLUCOSE 6 TIMES DAILY DX CODE: E10.65    GLUCAGON EMERGENCY KIT, HUMAN, 1 mg injection INJECT 1 ML INTRAMUSCULARLY ONCE FOR 1 DOSE.  albuterol (VENTOLIN HFA) 90 mcg/actuation inhaler Take  by inhalation.  ferrous sulfate (IRON) 325 mg (65 mg iron) EC tablet Take 1 tablet by mouthdaily (with meals),    insulin aspart (NOVOLOG) 100 unit/mL injection Use with insulin pump Max units daily: 100    busPIRone (BUSPAR) 10 mg tablet Take 10 mg by mouth two (2) times a day.  Blood-Glucose Meter (CONTOUR NEXT METER) misc Test blood glucose 4x daily    sertraline (ZOLOFT) 100 mg tablet Take 1 Tab by mouth daily. Indications: DEPRESSION    omeprazole (PRILOSEC) 20 mg capsule Take 10 mg by mouth daily. Not sure of dose    loratadine (CLARITIN) 10 mg tablet Take 10 mg by mouth daily.  NOVOLOG U-100 INSULIN ASPART 100 unit/mL injection INJECT 1 UNIT PER 10 GRAMS OF CARBS W/ CORRECTION SCALE MAX DAILY DOSE  UNITS    zinc 50 mg tab tablet Take  by mouth daily.  insulin glargine (LANTUS) 100 unit/mL injection Inject 18 units in QHS     No current facility-administered medications for this visit.       Allergies   Allergen Reactions    Latex Rash    Other Food Itching     Califlower    Banana Itching and Nausea and Vomiting    Benadryl [Diphenhydramine Hcl] Rash    Children's Tylenol [Acetaminophen] Rash    Honey Itching     Itchy tounge    Kiwi Itching and Nausea and Vomiting    Lactose Nausea and Vomiting     Only milk is the issue    Peach (Prunus Persica) Itching and Nausea and Vomiting     Allergic reactions to raw peaches    Pineapple Itching and Nausea and Vomiting    Strawberry Itching and Nausea and Vomiting Allergies to raw strawberries         Review of Systems:  -   - Eyes: no blurry vision no double vision  - Cardiovascular: no chest pain ,no palpitations  - Respiratory: no cough no shortness of breath  - Gastrointestinal: no dysphagia no  abdominal pain  - Musculoskeletal: no joint pains no  weakness  - Integumentary: no rashes  -   -     Physical Examination:   Blood pressure 99/62, pulse 79, temperature 96.9 °F (36.1 °C), temperature source Oral, resp. rate 16, height 5' (1.524 m), weight 191 lb (86.6 kg), last menstrual period 02/01/2019, SpO2 98 %, not currently breastfeeding. Estimated body mass index is 37.3 kg/m² as calculated from the following:    Height as of this encounter: 5' (1.524 m). -   Weight as of this encounter: 191 lb (86.6 kg).   - General: pleasant, no distress, good eye contact  - HEENT: no pallor, no periorbital edema, EOMI  - Neck: supple, no thyromegaly,   - Cardiovascular: regular, normal rate, normal S1 and S2  - Respiratory: clear to auscultation bilaterally  -   - Musculoskeletal:  + edema  - Neurological: alert and oriented  - Psychiatric: normal mood and affect  - Skin: color, texture, turgor normal.     Diabetic foot exam: March 2019    Left:     Vibratory sensation normal    Filament test normal sensation with micro filament   Pulse DP: 1+    Deformities: None  Right:    Vibratory sensation normal   Filament test normal sensation with micro filament   Pulse DP: 1+   Deformities: None    Data Reviewed:       Lab Results   Component Value Date/Time    Hemoglobin A1c 8.1 (H) 03/29/2019 04:23 PM    Hemoglobin A1c 8.0 (H) 11/07/2017 02:32 PM    Hemoglobin A1c 9.1 (H) 05/03/2016 03:52 PM    Glucose 363 (H) 11/07/2017 02:32 PM    Glucose 290 (H) 12/15/2013 06:00 AM    Glucose (POC) 215 (H) 01/05/2014 07:30 PM    Glucose  05/03/2019 12:06 PM    Microalb/Creat ratio (ug/mg creat.) <4.4 02/19/2018 04:11 PM    LDL,Direct 129 (H) 01/05/2015 02:18 PM    LDL, calculated 81 05/03/2016 03:52 PM    Creatinine (POC) 0.8 11/04/2014 03:12 PM    Creatinine 0.68 11/07/2017 02:32 PM      Lab Results   Component Value Date/Time    Cholesterol, total 169 05/03/2016 03:52 PM    HDL Cholesterol 74 05/03/2016 03:52 PM    LDL,Direct 129 (H) 01/05/2015 02:18 PM    LDL, calculated 81 05/03/2016 03:52 PM    Triglyceride 68 05/03/2016 03:52 PM    CHOL/HDL Ratio 2.6 12/15/2013 06:00 AM     Lab Results   Component Value Date/Time    ALT (SGPT) 9 11/07/2017 02:32 PM    AST (SGOT) 12 11/07/2017 02:32 PM    Alk. phosphatase 125 (H) 11/07/2017 02:32 PM    Bilirubin, total 0.3 11/07/2017 02:32 PM     Lab Results   Component Value Date/Time    GFR est  11/07/2017 02:32 PM    GFR est non- 11/07/2017 02:32 PM    Creatinine (POC) 0.8 11/04/2014 03:12 PM    Creatinine 0.68 11/07/2017 02:32 PM    BUN 10 11/07/2017 02:32 PM    Sodium 139 11/07/2017 02:32 PM    Potassium 4.8 11/07/2017 02:32 PM    Chloride 98 11/07/2017 02:32 PM    CO2 24 11/07/2017 02:32 PM      Lab Results   Component Value Date/Time    TSH 0.376 (L) 05/03/2019 03:18 PM    Triiodothyronine (T3), free 2.3 04/02/2013 12:00 AM    T4, Free 1.33 01/28/2014 03:46 PM    TSH, External 0.187 03/29/2019        Assessment/Plan:     1. Type 1 diabetes mellitus complicating pregnancy, antepartum    2. Insulin pump fitting or adjustment        1.  Type 1 Diabetes Mellitus   Lab Results   Component Value Date/Time    Hemoglobin A1c 8.1 (H) 03/29/2019 04:23 PM    Hemoglobin A1c (POC) 7 06/11/2019 11:24 AM   A1c 7 June 2019  OSCAR 29 Oct 2019   Avoid skipping meals  Stressed the importance of checking the blood glucose before meals and 2 hours after, bolus with every meal   On Tandem insulin pump ,Could not get the continuous glucose monitor  Discussed about the eye exam  Pump download in a week and weekly pump download discussed with the patient so we can titrate the insulin   - food choices are her main issue     Carbohydrates less than 40 g per meal which is the hardest part       2 Obesity:Body mass index is 37.3 kg/m². She seems to be on a high carbohydrate diet due to family situation ,getting help from [facility] and carb counting will definitely help her limit.     3 depression - stable    Subclinical hyperthyroidism: HCG mediated, it is improving      There are no Patient Instructions on file for this visit. Thank you for allowing me to participate in the care of this patient.     Teri Littlejohn MD    Patient verbalized understanding

## 2019-07-03 ENCOUNTER — TELEPHONE (OUTPATIENT)
Dept: ENDOCRINOLOGY | Age: 24
End: 2019-07-03

## 2019-07-03 NOTE — TELEPHONE ENCOUNTER
Informed pt that Dr Alanna Valenzuela reviewed her pump download and states her sugars are fluctuating. Explained Dr Ce Haines says she it not able to assess trend as there are not enough sugar readings after meals. She says it is very important to check sugars before meals and 2 hrs after eating so settings can be adjusted. Asked pt not to skip meals and she that she write down readings. Pt verbalized understanding and stated she can not bolus for every meals and snack as she works. States she is doing better with testing.

## 2019-07-03 NOTE — TELEPHONE ENCOUNTER
Since she is type 1 it is very important to bolus for every meal and snacks otherwise sugars will go up and affect the baby.

## 2019-07-11 NOTE — PROGRESS NOTES
Dre Lala is a 25 y.o. female here for   Chief Complaint   Patient presents with    Diabetes     pregnant       Functional glucose monitor and record keeping system? - yes  Eye exam within last year? - on file  Foot exam within last year? - on file    1. Have you been to the ER, urgent care clinic since your last visit? Hospitalized since your last visit? -no    2. Have you seen or consulted any other health care providers outside of the [address] [place] [address] since your last visit?   Include any pap smears or colon screening.-new OB GYN who told her she cannot see her

## 2019-07-12 ENCOUNTER — OFFICE VISIT (OUTPATIENT)
Dept: ENDOCRINOLOGY | Age: 24
End: 2019-07-12

## 2019-07-12 ENCOUNTER — HOSPITAL ENCOUNTER (OUTPATIENT)
Dept: PERINATAL CARE | Age: 24
Discharge: HOME | End: 2019-07-12
Attending: OBSTETRICS & GYNECOLOGY
Payer: MEDICAID

## 2019-07-12 VITALS
BODY MASS INDEX: 38.68 KG/M2 | HEART RATE: 94 BPM | HEIGHT: 60 IN | DIASTOLIC BLOOD PRESSURE: 66 MMHG | WEIGHT: 197 LBS | OXYGEN SATURATION: 98 % | SYSTOLIC BLOOD PRESSURE: 98 MMHG | TEMPERATURE: 97.5 F | RESPIRATION RATE: 14 BRPM

## 2019-07-12 DIAGNOSIS — Z79.4 ENCOUNTER FOR LONG-TERM (CURRENT) USE OF INSULIN (HCC): ICD-10-CM

## 2019-07-12 DIAGNOSIS — R79.89 ABNORMAL TSH: ICD-10-CM

## 2019-07-12 DIAGNOSIS — O24.019 TYPE 1 DIABETES MELLITUS COMPLICATING PREGNANCY, ANTEPARTUM: Primary | ICD-10-CM

## 2019-07-12 LAB
GLUCOSE POC: 166 MG/DL
HBA1C MFR BLD HPLC: 7.1 %

## 2019-07-12 PROCEDURE — 76816 OB US FOLLOW-UP PER FETUS: CPT | Performed by: OBSTETRICS & GYNECOLOGY

## 2019-07-12 NOTE — PROGRESS NOTES
[facility]               Amadeo Cespedes MD      Patient Information  Date:7/13/2019  Name : Lizandro Quach 25 y. o.     YOB: 1995         Referred by: None       Chief Complaint   Patient presents with    Diabetes     pregnant       History of Present Illness: Lizandro Quach is a 25 y.o. female here for follow up     She is currently pregnant, she has 2 children, the first child was taken away by , second child is with the baby's father  She lives with her boyfriend now  She is not checking blood glucose  as recommended, taking bolus insulin for only one meal  Missing meals as she is not hungry. There are several days where she has not taken any bolus at all which means she is missing 50% of the insulin. She is not able to get the continuous glucose monitor due to insurance   she works at [employer]      She was diagnosed with type 1 diabetes mellitus at age 10. Prior hx    She was seen by Dr. Jesse Hampton, pediatric endocrinologist . Cassandra Hoffman took her baby  and she is here with Diabetic Service Dog. She has seen several endocrinologists . 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 Before she had the Diabetic Service Dog, she had multiple hospitalizations. Wt Readings from Last 3 Encounters:   07/12/19 197 lb (89.4 kg)   06/11/19 191 lb (86.6 kg)   05/22/19 191 lb 3.2 oz (86.7 kg)       BP Readings from Last 3 Encounters:   07/12/19 98/66   06/11/19 99/62   05/22/19 108/64           Past Medical History:   Diagnosis Date    Asperger's syndrome     Asthma     [facility]     Depression     Diabetes ([facility]     E-coli UTI     Endocrine disease     diabetes    FHx: allergies     Manic depression ([address]     Vision decreased      Current Outpatient Medications   Medication Sig    terconazole (TERAZOL 7) 0.4 % vaginal cream Insert 1 Applicator into vagina nightly.     PNV No12-Iron-FA-DSS-OM-3 29 mg iron-1 mg -50 mg CPKD Take  by mouth.  glucose blood VI test strips (CONTOUR NEXT TEST STRIPS) strip TEST BLOOD GLUCOSE 6 TIMES DAILY DX CODE: E10.65    GLUCAGON EMERGENCY KIT, HUMAN, 1 mg injection INJECT 1 ML INTRAMUSCULARLY ONCE FOR 1 DOSE.  albuterol (VENTOLIN HFA) 90 mcg/actuation inhaler Take  by inhalation.  ferrous sulfate (IRON) 325 mg (65 mg iron) EC tablet Take 1 tablet by mouthdaily (with meals),    insulin aspart (NOVOLOG) 100 unit/mL injection Use with insulin pump Max units daily: 100    busPIRone (BUSPAR) 10 mg tablet Take 10 mg by mouth two (2) times a day.  Blood-Glucose Meter (CONTOUR NEXT METER) misc Test blood glucose 4x daily    sertraline (ZOLOFT) 100 mg tablet Take 1 Tab by mouth daily. Indications: DEPRESSION    omeprazole (PRILOSEC) 20 mg capsule Take 10 mg by mouth daily. Not sure of dose    loratadine (CLARITIN) 10 mg tablet Take 10 mg by mouth daily.  NOVOLOG U-100 INSULIN ASPART 100 unit/mL injection INJECT 1 UNIT PER 10 GRAMS OF CARBS W/ CORRECTION SCALE MAX DAILY DOSE  UNITS    zinc 50 mg tab tablet Take  by mouth daily.  insulin glargine (LANTUS) 100 unit/mL injection Inject 18 units in QHS     No current facility-administered medications for this visit.       Allergies   Allergen Reactions    Latex Rash    Other Food Itching     Califlower    Banana Itching and Nausea and Vomiting    Benadryl [Diphenhydramine Hcl] Rash    Children's Tylenol [Acetaminophen] Rash    Honey Itching     Itchy tounge    Kiwi Itching and Nausea and Vomiting    Lactose Nausea and Vomiting     Only milk is the issue    Peach (Prunus Persica) Itching and Nausea and Vomiting     Allergic reactions to raw peaches    Pineapple Itching and Nausea and Vomiting    Strawberry Itching and Nausea and Vomiting     Allergies to raw strawberries         Review of Systems:  -   - Eyes: no blurry vision no double vision  - Cardiovascular: no chest pain ,no palpitations  - Respiratory: no cough no shortness of breath  - Gastrointestinal: no dysphagia no  abdominal pain  - Musculoskeletal: no joint pains no  weakness  - Integumentary: no rashes  -   -     Physical Examination:   Blood pressure 98/66, pulse 94, temperature 97.5 °F (36.4 °C), temperature source Oral, resp. rate 14, height 5' (1.524 m), weight 197 lb (89.4 kg), last menstrual period 02/01/2019, SpO2 98 %, not currently breastfeeding. Estimated body mass index is 38.47 kg/m² as calculated from the following:    Height as of this encounter: 5' (1.524 m). -   Weight as of this encounter: 197 lb (89.4 kg).   - General: pleasant, no distress, good eye contact  - HEENT: no pallor, no periorbital edema, EOMI  - Neck: supple, no thyromegaly,   - Cardiovascular: regular, normal rate, normal S1 and S2  - Respiratory: clear to auscultation bilaterally  -   - Musculoskeletal:  + edema  - Neurological: alert and oriented  - Psychiatric: normal mood and affect  - Skin: color, texture, turgor normal.     Diabetic foot exam: March 2019    Left:     Vibratory sensation normal    Filament test normal sensation with micro filament   Pulse DP: 1+    Deformities: None  Right:    Vibratory sensation normal   Filament test normal sensation with micro filament   Pulse DP: 1+   Deformities: None    Data Reviewed:       Lab Results   Component Value Date/Time    Hemoglobin A1c 8.1 (H) 03/29/2019 04:23 PM    Hemoglobin A1c 8.0 (H) 11/07/2017 02:32 PM    Hemoglobin A1c 9.1 (H) 05/03/2016 03:52 PM    Glucose 363 (H) 11/07/2017 02:32 PM    Glucose 290 (H) 12/15/2013 06:00 AM    Glucose (POC) 215 (H) 01/05/2014 07:30 PM    Glucose  07/12/2019 11:16 AM    Microalb/Creat ratio (ug/mg creat.) <4.4 02/19/2018 04:11 PM    LDL,Direct 129 (H) 01/05/2015 02:18 PM    LDL, calculated 81 05/03/2016 03:52 PM    Creatinine (POC) 0.8 11/04/2014 03:12 PM    Creatinine 0.68 11/07/2017 02:32 PM      Lab Results   Component Value Date/Time Cholesterol, total 169 05/03/2016 03:52 PM    HDL Cholesterol 74 05/03/2016 03:52 PM    LDL,Direct 129 (H) 01/05/2015 02:18 PM    LDL, calculated 81 05/03/2016 03:52 PM    Triglyceride 68 05/03/2016 03:52 PM    CHOL/HDL Ratio 2.6 12/15/2013 06:00 AM     Lab Results   Component Value Date/Time    ALT (SGPT) 9 11/07/2017 02:32 PM    AST (SGOT) 12 11/07/2017 02:32 PM    Alk. phosphatase 125 (H) 11/07/2017 02:32 PM    Bilirubin, total 0.3 11/07/2017 02:32 PM     Lab Results   Component Value Date/Time    GFR est  11/07/2017 02:32 PM    GFR est non- 11/07/2017 02:32 PM    Creatinine (POC) 0.8 11/04/2014 03:12 PM    Creatinine 0.68 11/07/2017 02:32 PM    BUN 10 11/07/2017 02:32 PM    Sodium 139 11/07/2017 02:32 PM    Potassium 4.8 11/07/2017 02:32 PM    Chloride 98 11/07/2017 02:32 PM    CO2 24 11/07/2017 02:32 PM      Lab Results   Component Value Date/Time    TSH 0.376 (L) 05/03/2019 03:18 PM    Triiodothyronine (T3), free 2.3 04/02/2013 12:00 AM    T4, Free 1.33 01/28/2014 03:46 PM    TSH, External 0.187 03/29/2019        Assessment/Plan:     1. Type 1 diabetes mellitus complicating pregnancy, antepartum    2. Encounter for long-term (current) use of insulin ([address]        1.  Type 1 Diabetes Mellitus   Lab Results   Component Value Date/Time    Hemoglobin A1c 8.1 (H) 03/29/2019 04:23 PM    Hemoglobin A1c (POC) 7.1 07/12/2019 11:16 AM   A1c 7 June 2019  OSCAR 29 Oct 2019   Stressed the importance of checking the blood glucose before meals and 2 hours after, bolus with every meal, discussed and counseled multiple times in the past.   On Tandem insulin pump ,Could not get the continuous glucose monitor  Risks explained  She has met with the nutritionist, understands the concept, but no change in the behavior yet   she is missing 50% of the insulin when she is not bolusing, just getting the basal which is not controlling the sugars   - food choices are her main issue     Carbohydrates less than 40 g per meal which is the hardest part       2 Obesity:Body mass index is 38.47 kg/m². She seems to be on a high carbohydrate diet due to family situation ,getting help from [facility] and carb counting will definitely help her limit.     3 depression - stable    Subclinical hyperthyroidism: HCG mediated,  Gaining weight  Need repeat labs      There are no Patient Instructions on file for this visit. Follow-up and Dispositions    · Return in about 1 month (around 8/9/2019) for labs before next visit and follow up. Thank you for allowing me to participate in the care of this patient.     Franchesca Zhang MD    Patient verbalized understanding

## 2019-07-15 ENCOUNTER — TELEPHONE (OUTPATIENT)
Dept: ENDOCRINOLOGY | Age: 24
End: 2019-07-15

## 2019-07-15 DIAGNOSIS — E10.65 HYPERGLYCEMIA DUE TO TYPE 1 DIABETES MELLITUS (HCC): ICD-10-CM

## 2019-07-15 DIAGNOSIS — R79.89 ABNORMAL TSH: Primary | ICD-10-CM

## 2019-07-15 DIAGNOSIS — O24.019 TYPE 1 DIABETES MELLITUS COMPLICATING PREGNANCY, ANTEPARTUM: ICD-10-CM

## 2019-07-15 RX ORDER — INSULIN ASPART 100 [IU]/ML
INJECTION, SOLUTION INTRAVENOUS; SUBCUTANEOUS
Qty: 30 ML | Refills: 9 | Status: SHIPPED | OUTPATIENT
Start: 2019-07-15 | End: 2020-09-02

## 2019-07-30 ENCOUNTER — TELEPHONE (OUTPATIENT)
Dept: ENDOCRINOLOGY | Age: 24
End: 2019-07-30

## 2019-07-30 NOTE — TELEPHONE ENCOUNTER
----- Message from Beata Milligan sent at 7/30/2019  5:13 PM EDT -----  Regarding: Dr. Zoie Hansen / Sylvester Figueroa with [facility] ([phone]), is calling to f/up on \"90 days of blood glucose logs\".   Fax: [phone]

## 2019-08-03 LAB
ALBUMIN SERPL-MCNC: 3.1 G/DL (ref 3.5–5.5)
ALBUMIN/CREAT UR: <5.1 MG/G CREAT (ref 0–30)
ALBUMIN/GLOB SERPL: 1.2 {RATIO} (ref 1.2–2.2)
ALP SERPL-CCNC: 81 IU/L (ref 39–117)
ALT SERPL-CCNC: 11 IU/L (ref 0–32)
AST SERPL-CCNC: 13 IU/L (ref 0–40)
BILIRUB SERPL-MCNC: 0.2 MG/DL (ref 0–1.2)
BUN SERPL-MCNC: 8 MG/DL (ref 6–20)
BUN/CREAT SERPL: 18 (ref 9–23)
CALCIUM SERPL-MCNC: 8.2 MG/DL (ref 8.7–10.2)
CHLORIDE SERPL-SCNC: 102 MMOL/L (ref 96–106)
CO2 SERPL-SCNC: 20 MMOL/L (ref 20–29)
CREAT SERPL-MCNC: 0.45 MG/DL (ref 0.57–1)
CREAT UR-MCNC: 58.5 MG/DL
EST. AVERAGE GLUCOSE BLD GHB EST-MCNC: 183 MG/DL
GLOBULIN SER CALC-MCNC: 2.5 G/DL (ref 1.5–4.5)
GLUCOSE SERPL-MCNC: 210 MG/DL (ref 65–99)
HBA1C MFR BLD: 8 % (ref 4.8–5.6)
MICROALBUMIN UR-MCNC: <3 UG/ML
POTASSIUM SERPL-SCNC: 4.1 MMOL/L (ref 3.5–5.2)
PROT SERPL-MCNC: 5.6 G/DL (ref 6–8.5)
SODIUM SERPL-SCNC: 135 MMOL/L (ref 134–144)
TSH SERPL DL<=0.005 MIU/L-ACNC: 0.08 UIU/ML (ref 0.45–4.5)

## 2019-08-07 ENCOUNTER — TELEPHONE (OUTPATIENT)
Dept: ENDOCRINOLOGY | Age: 24
End: 2019-08-07

## 2019-08-07 NOTE — TELEPHONE ENCOUNTER
----- Message from Barbra Fitzgerald MD sent at 8/6/2019  5:52 PM EDT -----  Please add FT4 to sample in the lab

## 2019-08-10 LAB
SPECIMEN STATUS REPORT, ROLRST: NORMAL
T4 FREE SERPL-MCNC: 1.47 NG/DL (ref 0.82–1.77)

## 2019-08-14 ENCOUNTER — TELEPHONE (OUTPATIENT)
Dept: ENDOCRINOLOGY | Age: 24
End: 2019-08-14

## 2019-08-14 NOTE — TELEPHONE ENCOUNTER
----- Message from Jean-Pierre Hein MD sent at 8/10/2019 11:59 PM EDT -----  Thyroid levels are off but very minimally.   We will re-check it at her next visit

## 2019-08-14 NOTE — TELEPHONE ENCOUNTER
Attempted to call. Unsuccessful. Left msg for Yesenia Nichole to give us a call back at the office. A callback number was left.

## 2019-08-15 NOTE — TELEPHONE ENCOUNTER
Per Dr. Santo Lua, informed pt of result note, as noted above. Pt verbalized understanding and stated she went back to the settings from before she was pregnant since having her baby. She will call back in a few to schedule f/u. No further questions or concerns at this time.

## 2019-11-18 DIAGNOSIS — E10.65 HYPERGLYCEMIA DUE TO TYPE 1 DIABETES MELLITUS (HCC): ICD-10-CM

## 2019-11-18 RX ORDER — INSULIN ASPART 100 [IU]/ML
INJECTION, SOLUTION INTRAVENOUS; SUBCUTANEOUS
Qty: 30 ML | Refills: 9 | Status: SHIPPED | OUTPATIENT
Start: 2019-11-18 | End: 2020-09-02

## 2019-12-23 ENCOUNTER — TELEPHONE (OUTPATIENT)
Dept: ENDOCRINOLOGY | Age: 24
End: 2019-12-23

## 2019-12-23 NOTE — TELEPHONE ENCOUNTER
Ary Escalera is waiting on reply to detailed documentation to receive diabetic supply  #[phone] ext.  [phone]

## 2019-12-24 ENCOUNTER — TELEPHONE (OUTPATIENT)
Dept: ENDOCRINOLOGY | Age: 24
End: 2019-12-24

## 2019-12-24 NOTE — TELEPHONE ENCOUNTER
Adelfo Wilkerson called stating need additional info about one of questions answers.   [phone] - [facility]#[phone]

## 2019-12-30 NOTE — TELEPHONE ENCOUNTER
Sahara Torres stated they received the paper work from our office with a note stating the doctor will not sign off on pt checking BG 6 x a day. They need to know how many times a day pt is checking. Per Dr. Aayush Mohr, informed them 4x. They will process the order for the pt and reach out to her when it is on the way. It should be 3-5 business days. No further questions or concerns at this time.

## 2020-04-29 ENCOUNTER — TELEPHONE (OUTPATIENT)
Dept: ENDOCRINOLOGY | Age: 25
End: 2020-04-29

## 2020-04-29 NOTE — TELEPHONE ENCOUNTER
----- Message from Nacny Course sent at 4/28/2020  3:52 PM EDT -----  Regarding: /telephone  General Message/Vendor Calls    Caller's first and last name: [facility]. Reason for call: Massiel Sierra stated they would like a call back with the status on requests they have sent over for Progress notes. Callback required yes/no and why:yes      Best contact number(s): [phone] ext [phone] .

## 2020-04-30 NOTE — TELEPHONE ENCOUNTER
Informed Lizzeth from [facility] that the office has not received any forms regarding Tasneem Milligan. Asked that whatever is needed be faxed to [phone].

## 2020-05-05 ENCOUNTER — TELEPHONE (OUTPATIENT)
Dept: ENDOCRINOLOGY | Age: 25
End: 2020-05-05

## 2020-05-05 NOTE — TELEPHONE ENCOUNTER
----- Message from Emily Connors sent at 5/5/2020  2:08 PM EDT -----  Regarding: Dr Mary Wilkes first and last name: [address] [place] [address]      Reason for call: Betzaida Ware is requesting office notes from pt's visits for the last six months      Callback required yes/no and why:yes      Best contact number(s):[phone]      Details to clarify the request:      Emily Connors

## 2020-09-01 ENCOUNTER — VIRTUAL VISIT (OUTPATIENT)
Dept: ENDOCRINOLOGY | Age: 25
End: 2020-09-01
Payer: MEDICAID

## 2020-09-01 DIAGNOSIS — E05.90 SUBCLINICAL HYPERTHYROIDISM: ICD-10-CM

## 2020-09-01 DIAGNOSIS — O24.019 TYPE 1 DIABETES MELLITUS COMPLICATING PREGNANCY, ANTEPARTUM: Primary | ICD-10-CM

## 2020-09-01 DIAGNOSIS — E66.9 OBESITY (BMI 30-39.9): ICD-10-CM

## 2020-09-01 DIAGNOSIS — F32.9 REACTIVE DEPRESSION: ICD-10-CM

## 2020-09-01 PROCEDURE — 99215 OFFICE O/P EST HI 40 MIN: CPT | Performed by: INTERNAL MEDICINE

## 2020-09-01 RX ORDER — LEVETIRACETAM 500 MG/1
TABLET ORAL 2 TIMES DAILY
COMMUNITY
End: 2021-09-15

## 2020-09-01 RX ORDER — ACETAMINOPHEN AND CODEINE PHOSPHATE 120; 12 MG/5ML; MG/5ML
1 SOLUTION ORAL DAILY
COMMUNITY

## 2020-09-01 NOTE — PROGRESS NOTES
Sergey Page is a 22 y.o. female here for Diabetes    1. Have you been to the ER, urgent care clinic since your last visit? Hospitalized since your last visit? - yes blood clot on brain in 06/2020 and seizure the day after being discharged     2. Have you seen or consulted any other health care providers outside of the [address] [place] [address] since your last visit?   Include any pap smears or colon screening.- neurologist   Order placed for pt,  per Verbal Order with read back from Dr Luz Elena Currie 9/2/2020

## 2020-09-02 DIAGNOSIS — O24.414 INSULIN CONTROLLED GESTATIONAL DIABETES MELLITUS (GDM) IN FIRST TRIMESTER: ICD-10-CM

## 2020-09-02 DIAGNOSIS — E10.65 HYPERGLYCEMIA DUE TO TYPE 1 DIABETES MELLITUS (HCC): Primary | ICD-10-CM

## 2020-09-02 RX ORDER — INSULIN ASPART 100 [IU]/ML
INJECTION, SOLUTION INTRAVENOUS; SUBCUTANEOUS
Qty: 30 ML | Refills: 11 | Status: SHIPPED | OUTPATIENT
Start: 2020-09-02 | End: 2021-09-10

## 2020-09-02 NOTE — PROGRESS NOTES
Shae Paez MD      Patient Information  Date:9/3/2020  Name : Dc Rodrigues 22 y.o.     YOB: 1995         Referred by: UNKNOWN       Chief Complaint   Patient presents with    Diabetes     Pursuant to the emergency declaration under the [facility] [address] [facility] authority and the [employer] Act, this Virtual  Visit was conducted, with patient's consent, to reduce the patient's risk of exposure to COVID-19 . Patient  is aware that this is a billable encounter and is responsible for copays/deductibles       Services were provided through a video synchronous discussion virtually to substitute for in-person clinic visit. Place of service: Provider : Office  Patient: Home    History of Present Illness: Dc Rodrigues is a 22 y.o. female here for follow up     She was seen more than a year ago  Delivered third child last year and reportedly she is with the other family, baby has encephalocele. She has 2 other children,the first child was taken away by , second child is with the baby's father  She is working now  Reports fluctuating blood glucose, hypoglycemia  I do not have any readings,  She is checking 4 times daily, interested in CGM  Missing meals when she is not hungry. In the past she was missing bolus and was depending mostly on the basal    No chest pain, shortness of breath    She had low TSH during pregnancy, has not had repeat labs  No weight loss    She was diagnosed with type 1 diabetes mellitus at age 10. Prior hx    She was seen by Dr. Flakito Kraus, pediatric endocrinologist . Andreea Baptiste took her baby  and she is here with Diabetic Service Dog. She has seen several endocrinologists . 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     Before she had the Diabetic Service Dog, she had multiple hospitalizations. Wt Readings from Last 3 Encounters:   07/12/19 197 lb (89.4 kg)   06/11/19 191 lb (86.6 kg)   05/22/19 191 lb 3.2 oz (86.7 kg)       BP Readings from Last 3 Encounters:   07/12/19 98/66   06/11/19 99/62   05/22/19 108/64           Past Medical History:   Diagnosis Date    Asperger's syndrome     Asthma     Bipolar 1 disorder ([facility]     Depression     Diabetes ([facility]     E-coli UTI     Endocrine disease     diabetes    FHx: allergies     Manic depression ([facility])     Vision decreased      Current Outpatient Medications   Medication Sig    apixaban (Eliquis) 5 mg tablet Take 5 mg by mouth two (2) times a day.  norethindrone (Incassia) 0.35 mg tab Take 1 Tab by mouth daily.  levETIRAcetam (Keppra) 500 mg tablet Take  by mouth two (2) times a day.  glucose blood VI test strips (Contour Next Test Strips) strip TEST BLOOD GLUCOSE 6 TIMES DAILY DX CODE: E10.65    albuterol (VENTOLIN HFA) 90 mcg/actuation inhaler Take  by inhalation.  ferrous sulfate (IRON) 325 mg (65 mg iron) EC tablet Take 1 tablet by mouthdaily (with meals),    busPIRone (BUSPAR) 10 mg tablet Take 10 mg by mouth two (2) times a day.  Blood-Glucose Meter (CONTOUR NEXT METER) misc Test blood glucose 4x daily    sertraline (ZOLOFT) 100 mg tablet Take 1 Tab by mouth daily. Indications: DEPRESSION    insulin aspart U-100 (NovoLOG U-100 Insulin aspart) 100 unit/mL injection Use with insulin pump Max units daily: 100    glucagon (Glucagon Emergency Kit, human,) 1 mg injection INJECT 1 ML INTRAMUSCULARLY ONCE FOR 1 DOSE. No current facility-administered medications for this visit.       Allergies   Allergen Reactions    Latex Rash    Other Food Itching     Califlower    Banana Itching and Nausea and Vomiting    Benadryl [Diphenhydramine Hcl] Rash    Children's Tylenol [Acetaminophen] Rash    Honey Itching     Itchy tounge    Kiwi Itching and Nausea and Vomiting    Lactose Nausea and Vomiting     Only milk is the issue    Peach (Prunus Persica) Itching and Nausea and Vomiting     Allergic reactions to raw peaches    Pineapple Itching and Nausea and Vomiting    Strawberry Itching and Nausea and Vomiting     Allergies to raw strawberries         Review of Systems:  - Review of all systems negative other than mentioned in the HPI  -     Physical Examination:   unknown if currently breastfeeding. Estimated body mass index is 38.47 kg/m² as calculated from the following:    Height as of 7/12/19: 5' (1.524 m). -   Weight as of 7/12/19: 197 lb (89.4 kg).   - General: pleasant, no distress, good eye contact  - HEENT: no exophthalmos, no periorbital edema, EOMI  - Neck: No visible thyromegaly  - RS: Normal respiratory effort  - Musculoskeletal: no tremors  - Neurological: alert and oriented  - Psychiatric: normal mood and affect  - Skin: Normal color    Diabetic foot exam: March 2019    Left:     Vibratory sensation normal    Filament test normal sensation with micro filament   Pulse DP: 1+    Deformities: None  Right:    Vibratory sensation normal   Filament test normal sensation with micro filament   Pulse DP: 1+   Deformities: None    Data Reviewed:       Lab Results   Component Value Date/Time    Hemoglobin A1c 8.0 (H) 08/02/2019 12:00 AM    Hemoglobin A1c 8.1 (H) 03/29/2019 04:23 PM    Hemoglobin A1c 8.0 (H) 11/07/2017 02:32 PM    Glucose 210 (H) 08/02/2019 12:00 AM    Glucose 290 (H) 12/15/2013 06:00 AM    Glucose (POC) 215 (H) 01/05/2014 07:30 PM    Glucose  07/12/2019 11:16 AM    Microalb/Creat ratio (ug/mg creat.) <5.1 08/02/2019 12:00 AM    LDL,Direct 129 (H) 01/05/2015 02:18 PM    LDL, calculated 81 05/03/2016 03:52 PM    Creatinine (POC) 0.8 11/04/2014 03:12 PM    Creatinine 0.45 (L) 08/02/2019 12:00 AM      Lab Results   Component Value Date/Time    Cholesterol, total 169 05/03/2016 03:52 PM    HDL Cholesterol 74 05/03/2016 03:52 PM    LDL,Direct 129 (H) 01/05/2015 02:18 PM    LDL, calculated 81 05/03/2016 03:52 PM    Triglyceride 68 05/03/2016 03:52 PM    CHOL/HDL Ratio 2.6 12/15/2013 06:00 AM     Lab Results   Component Value Date/Time    ALT (SGPT) 11 08/02/2019 12:00 AM    Alk. phosphatase 81 08/02/2019 12:00 AM    Bilirubin, total 0.2 08/02/2019 12:00 AM     Lab Results   Component Value Date/Time    GFR est  08/02/2019 12:00 AM    GFR est non- 08/02/2019 12:00 AM    Creatinine (POC) 0.8 11/04/2014 03:12 PM    Creatinine 0.45 (L) 08/02/2019 12:00 AM    BUN 8 08/02/2019 12:00 AM    Sodium 135 08/02/2019 12:00 AM    Potassium 4.1 08/02/2019 12:00 AM    Chloride 102 08/02/2019 12:00 AM    CO2 20 08/02/2019 12:00 AM      Lab Results   Component Value Date/Time    TSH 0.083 (L) 08/02/2019 12:00 AM    Triiodothyronine (T3), free 2.3 04/02/2013 12:00 AM    T4, Free 1.47 08/02/2019 12:00 AM    TSH, External 0.187 03/29/2019        Assessment/Plan:     1. Type 1 diabetes mellitus complicating pregnancy, antepartum    2. Subclinical hyperthyroidism        1. Type 1 Diabetes Mellitus   Lab Results   Component Value Date/Time    Hemoglobin A1c 8.0 (H) 08/02/2019 12:00 AM    Hemoglobin A1c (POC) 7.1 07/12/2019 11:16 AM   A1c 7 June 2019  Delivered baby and baby is not with her  Stressed the importance of checking the blood glucose before meals and 2 hours after, bolus with every meal, discussed and counseled multiple times in the past.  We will try to get tandem control IQ  Patient is checking the blood glucose 4 times a day. Patient is taking insulin 3 or more times per day  Patient is adjusting the insulin based on the blood glucose checks. Patient is at risk for hypoglycemia. I recommend continuous glucose monitor to help manage the diabetes better  Asked to upload tandem    Carbohydrates less than 40 g per meal which is the hardest part       2 Obesity:There is no height or weight on file to calculate BMI.      3 depression - stable    Subclinical hyperthyroidism:  Thought to be  HCG mediated,  Labs      There are no Patient Instructions on file for this visit. Thank you for allowing me to participate in the care of this patient.     Wali Noguera MD    Patient verbalized understanding

## 2020-12-15 DIAGNOSIS — E10.65 HYPERGLYCEMIA DUE TO TYPE 1 DIABETES MELLITUS (HCC): ICD-10-CM

## 2020-12-15 RX ORDER — BLOOD SUGAR DIAGNOSTIC
STRIP MISCELLANEOUS
Qty: 150 STRIP | Refills: 11 | Status: SHIPPED | OUTPATIENT
Start: 2020-12-15

## 2020-12-31 DIAGNOSIS — E10.65 HYPERGLYCEMIA DUE TO TYPE 1 DIABETES MELLITUS (HCC): ICD-10-CM

## 2021-01-06 ENCOUNTER — OFFICE VISIT (OUTPATIENT)
Dept: ENDOCRINOLOGY | Age: 26
End: 2021-01-06
Payer: MEDICAID

## 2021-01-06 VITALS
OXYGEN SATURATION: 98 % | SYSTOLIC BLOOD PRESSURE: 92 MMHG | TEMPERATURE: 98.1 F | WEIGHT: 197 LBS | BODY MASS INDEX: 38.68 KG/M2 | HEART RATE: 100 BPM | DIASTOLIC BLOOD PRESSURE: 70 MMHG | HEIGHT: 60 IN | RESPIRATION RATE: 16 BRPM

## 2021-01-06 DIAGNOSIS — E28.2 PCOS (POLYCYSTIC OVARIAN SYNDROME): ICD-10-CM

## 2021-01-06 DIAGNOSIS — E10.65 HYPERGLYCEMIA DUE TO TYPE 1 DIABETES MELLITUS (HCC): Primary | ICD-10-CM

## 2021-01-06 PROCEDURE — 99215 OFFICE O/P EST HI 40 MIN: CPT | Performed by: INTERNAL MEDICINE

## 2021-01-06 NOTE — LETTER
NOTIFICATION RETURN TO WORK / SCHOOL 
 
1/6/2021 9:12 AM 
 
Ms. Jasper Galvan [address] [id number] To Whom It May Concern: Jasper Galvan is currently under the care of [address] [address] [address] To Whom It May Concern, Jasper Galvan is a patient of mine who has Type 1 diabetes. She is currently managing  diabetes with an insulin pump. She needs to have access to her pump and her testing supplies at all times to control her blood sugar. She needs breaks of 15 minutes each to check sugars, eat snacks and meals every 3 hours. If you have any questions, please do not hesitate to contact my office . Sincerely, Maged Almonte MD

## 2021-01-06 NOTE — PROGRESS NOTES
Kailash Leiva MD      Patient Information  Date:1/6/2021  Name : Abi Perez 22 y.o.     YOB: 1995         Referred by: UNKNOWN       Chief Complaint   Patient presents with    Diabetes       History of Present Illness: Abi Perez is a 22 y.o. female here for follow up     Type 1 diabetes mellitus, on insulin pump  She was on Dexcom while she was pregnant, having difficulty getting Dexcom and to upgrade the tandem pump  She is working now  Reports fluctuating blood glucose, hypoglycemia with increased activity. Blood glucose are ranging from 200-250  Most of the carb intake is later in the evening after work  She is checking 4 times daily,  Sometimes missing meals  Carbohydrates are ranging from  g per meal    No chest pain, shortness of breath        She was diagnosed with type 1 diabetes mellitus at age 10. Prior hx    She was seen by Dr. Omid Rashid, pediatric endocrinologist . Briana [place] took her baby  and she is here with Diabetic Service Dog. She has seen several endocrinologists . 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 Before she had the Diabetic Service Dog, she had multiple hospitalizations. Wt Readings from Last 3 Encounters:   01/06/21 197 lb (89.4 kg)   07/12/19 197 lb (89.4 kg)   06/11/19 191 lb (86.6 kg)       BP Readings from Last 3 Encounters:   01/06/21 92/70   07/12/19 98/66   06/11/19 99/62           Past Medical History:   Diagnosis Date    Asperger's syndrome     Asthma     [facility] disorder ([facility] [address]     Depression     Diabetes ([facility] [address]     E-coli UTI     Endocrine disease     diabetes    FHx: allergies     Hx of blood clots 2020    Manic depression (HCC)     Vision decreased      Current Outpatient Medications   Medication Sig    glucagon (GlucaGen HypoKit) 1 mg injection INJECT 1 ML INTRAMUSCULARLY ONCE FOR 1 DOSE.     glucose blood VI test strips (Contour Next Test Strips) strip TEST BLOOD GLUCOSE 6 TIMES DAILY DX CODE: E10.65    insulin aspart U-100 (NovoLOG U-100 Insulin aspart) 100 unit/mL injection Use with insulin pump Max units daily: 100    apixaban (Eliquis) 5 mg tablet Take 5 mg by mouth two (2) times a day.  norethindrone (Incassia) 0.35 mg tab Take 1 Tab by mouth daily.  levETIRAcetam (Keppra) 500 mg tablet Take  by mouth two (2) times a day.  albuterol (VENTOLIN HFA) 90 mcg/actuation inhaler Take  by inhalation.  ferrous sulfate (IRON) 325 mg (65 mg iron) EC tablet Take 1 tablet by mouthdaily (with meals),    busPIRone (BUSPAR) 10 mg tablet Take 10 mg by mouth two (2) times a day.  Blood-Glucose Meter (CONTOUR NEXT METER) misc Test blood glucose 4x daily    sertraline (ZOLOFT) 100 mg tablet Take 1 Tab by mouth daily. Indications: DEPRESSION     No current facility-administered medications for this visit. Allergies   Allergen Reactions    Latex Rash    Other Food Itching     Califlower    Banana Itching and Nausea and Vomiting    Benadryl [Diphenhydramine Hcl] Rash    Children's Tylenol [Acetaminophen] Rash    Honey Itching     Itchy tounge    Kiwi Itching and Nausea and Vomiting    Lactose Nausea and Vomiting     Only milk is the issue    Peach (Prunus Persica) Itching and Nausea and Vomiting     Allergic reactions to raw peaches    Pineapple Itching and Nausea and Vomiting    Strawberry Itching and Nausea and Vomiting     Allergies to raw strawberries         Review of Systems:  - Per HPI  -     Physical Examination:   Blood pressure 92/70, pulse 100, temperature 98.1 °F (36.7 °C), temperature source Oral, resp. rate 16, height 5' (1.524 m), weight 197 lb (89.4 kg), SpO2 98 %, unknown if currently breastfeeding. Estimated body mass index is 38.47 kg/m² as calculated from the following:    Height as of this encounter: 5' (1.524 m).   -   Weight as of this encounter: 197 lb (89.4 kg).  - General: pleasant, no distress, good eye contact  - HEENT: no exophthalmos, no periorbital edema, EOMI  - Neck: No thyromegaly  - CVS: S1-S2 regular  - RS: Normal respiratory effort  - Musculoskeletal: no tremors  - Neurological: alert and oriented  - Psychiatric: normal mood and affect  - Skin: Normal color    Diabetic foot exam: March 2019    Left:     Vibratory sensation normal    Filament test normal sensation with micro filament   Pulse DP: 1+    Deformities: None  Right:    Vibratory sensation normal   Filament test normal sensation with micro filament   Pulse DP: 1+   Deformities: None    Data Reviewed:       Lab Results   Component Value Date/Time    Hemoglobin A1c 8.0 (H) 08/02/2019 12:00 AM    Hemoglobin A1c 8.1 (H) 03/29/2019 04:23 PM    Hemoglobin A1c 8.0 (H) 11/07/2017 02:32 PM    Glucose 210 (H) 08/02/2019 12:00 AM    Glucose 290 (H) 12/15/2013 06:00 AM    Glucose (POC) 215 (H) 01/05/2014 07:30 PM    Glucose  07/12/2019 11:16 AM    Microalb/Creat ratio (ug/mg creat.) <5.1 08/02/2019 12:00 AM    LDL,Direct 129 (H) 01/05/2015 02:18 PM    LDL, calculated 81 05/03/2016 03:52 PM    Creatinine (POC) 0.8 11/04/2014 03:12 PM    Creatinine 0.45 (L) 08/02/2019 12:00 AM      Lab Results   Component Value Date/Time    Cholesterol, total 169 05/03/2016 03:52 PM    HDL Cholesterol 74 05/03/2016 03:52 PM    LDL,Direct 129 (H) 01/05/2015 02:18 PM    LDL, calculated 81 05/03/2016 03:52 PM    Triglyceride 68 05/03/2016 03:52 PM    CHOL/HDL Ratio 2.6 12/15/2013 06:00 AM     Lab Results   Component Value Date/Time    ALT (SGPT) 11 08/02/2019 12:00 AM    Alk.  phosphatase 81 08/02/2019 12:00 AM    Bilirubin, total 0.2 08/02/2019 12:00 AM     Lab Results   Component Value Date/Time    GFR est  08/02/2019 12:00 AM    GFR est non- 08/02/2019 12:00 AM    Creatinine (POC) 0.8 11/04/2014 03:12 PM    Creatinine 0.45 (L) 08/02/2019 12:00 AM    BUN 8 08/02/2019 12:00 AM    Sodium 135 08/02/2019 12:00 AM Potassium 4.1 08/02/2019 12:00 AM    Chloride 102 08/02/2019 12:00 AM    CO2 20 08/02/2019 12:00 AM      Lab Results   Component Value Date/Time    TSH 0.083 (L) 08/02/2019 12:00 AM    Triiodothyronine (T3), free 2.3 04/02/2013 12:00 AM    T4, Free 1.47 08/02/2019 12:00 AM    TSH, External 0.187 03/29/2019        Assessment/Plan:     1. Hyperglycemia due to type 1 diabetes mellitus ([facility] [address]    2. PCOS (polycystic ovarian syndrome)        1. Type 1 Diabetes Mellitus   Lab Results   Component Value Date/Time    Hemoglobin A1c 8.0 (H) 08/02/2019 12:00 AM    Hemoglobin A1c (POC) 7.1 07/12/2019 11:16 AM     A1c October 2029, uncontrolled,  Having hypoglycemia ,without close monitoring or CGM hypoglycemia risk will be very high  Not able to get tandem control IQ approved by insurance  Patient is checking the blood glucose 4 times a day. Patient is taking insulin 3 or more times per day via insulin pump  Patient is adjusting the insulin based on the blood glucose checks. Patient is at risk for hypoglycemia. I recommend continuous glucose monitor to help manage the diabetes better  Stressed importance of reducing the carbohydrates to less than 50 g per meal, to increase the protein        2 Obesity:Body mass index is 38.47 kg/m². 3 depression - stable    Subclinical hyperthyroidism: Recheck labs      I spent 40 minutes caring for this patient, reviewing labs,  insulin pump download, as well as documentation    There are no Patient Instructions on file for this visit. Thank you for allowing me to participate in the care of this patient.     Ev Rai MD    Patient verbalized understanding

## 2021-01-06 NOTE — PROGRESS NOTES
Diane Salas is a 22 y.o. female here for   Chief Complaint   Patient presents with    Diabetes       1. Have you been to the ER, urgent care clinic since your last visit? Hospitalized since your last visit? -no    2. Have you seen or consulted any other health care providers outside of the [address] [place] [address] since your last visit?   Include any pap smears or colon screening.-no

## 2021-01-06 NOTE — LETTER
NOTIFICATION RETURN TO WORK / SCHOOL 
 
1/6/2021 8:58 AM 
 
Ms. Alex Edwards [address] [id number] To Whom It May Concern: Alex Edwards is currently under the care of [address] [address] [address] To Whom It May Concern, Alex Edwards is a patient of mine who has Type 1 diabetes. she is currently managing  diabetes with an insulin pump.  she needs to have access to her pump and her testing supplies at all times to control her blood sugar. She needs breaks of 15 minutes each to check sugars,eat snacks and meals every 3 hours. If you have any questions, please do not hesitate to contact my office . Sincerely, Tera Martinez MD,[facility] If there are questions or concerns please have the patient contact our office. Sincerely, Mandeep [facility] MD

## 2021-01-07 LAB
ALBUMIN SERPL-MCNC: 4.2 G/DL (ref 3.5–5)
ALBUMIN/GLOB SERPL: 1.1 {RATIO} (ref 1.1–2.2)
ALP SERPL-CCNC: 153 U/L (ref 45–117)
ALT SERPL-CCNC: 18 U/L (ref 12–78)
ANION GAP SERPL CALC-SCNC: 6 MMOL/L (ref 5–15)
AST SERPL-CCNC: 12 U/L (ref 15–37)
BILIRUB SERPL-MCNC: 0.3 MG/DL (ref 0.2–1)
BUN SERPL-MCNC: 13 MG/DL (ref 6–20)
BUN/CREAT SERPL: 18 (ref 12–20)
CALCIUM SERPL-MCNC: 9.5 MG/DL (ref 8.5–10.1)
CHLORIDE SERPL-SCNC: 107 MMOL/L (ref 97–108)
CO2 SERPL-SCNC: 27 MMOL/L (ref 21–32)
CREAT SERPL-MCNC: 0.72 MG/DL (ref 0.55–1.02)
CREAT UR-MCNC: 118 MG/DL
EST. AVERAGE GLUCOSE BLD GHB EST-MCNC: 237 MG/DL
GLOBULIN SER CALC-MCNC: 3.9 G/DL (ref 2–4)
GLUCOSE SERPL-MCNC: 81 MG/DL (ref 65–100)
HBA1C MFR BLD: 9.9 % (ref 4–5.6)
MICROALBUMIN UR-MCNC: 1.19 MG/DL
MICROALBUMIN/CREAT UR-RTO: 10 MG/G (ref 0–30)
POTASSIUM SERPL-SCNC: 4.2 MMOL/L (ref 3.5–5.1)
PROT SERPL-MCNC: 8.1 G/DL (ref 6.4–8.2)
SODIUM SERPL-SCNC: 140 MMOL/L (ref 136–145)
TSH SERPL DL<=0.05 MIU/L-ACNC: 0.37 UIU/ML (ref 0.36–3.74)

## 2021-06-09 ENCOUNTER — OFFICE VISIT (OUTPATIENT)
Dept: ENDOCRINOLOGY | Age: 26
End: 2021-06-09
Payer: MEDICAID

## 2021-06-09 VITALS
SYSTOLIC BLOOD PRESSURE: 103 MMHG | TEMPERATURE: 97.9 F | BODY MASS INDEX: 38.87 KG/M2 | DIASTOLIC BLOOD PRESSURE: 67 MMHG | WEIGHT: 198 LBS | HEIGHT: 60 IN | OXYGEN SATURATION: 97 % | HEART RATE: 97 BPM

## 2021-06-09 DIAGNOSIS — E10.65 HYPERGLYCEMIA DUE TO TYPE 1 DIABETES MELLITUS (HCC): Primary | ICD-10-CM

## 2021-06-09 PROCEDURE — 99214 OFFICE O/P EST MOD 30 MIN: CPT | Performed by: INTERNAL MEDICINE

## 2021-06-10 LAB
ANION GAP SERPL CALC-SCNC: 8 MMOL/L (ref 5–15)
BUN SERPL-MCNC: 12 MG/DL (ref 6–20)
BUN/CREAT SERPL: 21 (ref 12–20)
CALCIUM SERPL-MCNC: 9.6 MG/DL (ref 8.5–10.1)
CHLORIDE SERPL-SCNC: 107 MMOL/L (ref 97–108)
CO2 SERPL-SCNC: 25 MMOL/L (ref 21–32)
CREAT SERPL-MCNC: 0.58 MG/DL (ref 0.55–1.02)
CREAT UR-MCNC: 68.5 MG/DL
EST. AVERAGE GLUCOSE BLD GHB EST-MCNC: 240 MG/DL
GLUCOSE SERPL-MCNC: 137 MG/DL (ref 65–100)
HBA1C MFR BLD: 10 % (ref 4–5.6)
LDLC SERPL DIRECT ASSAY-MCNC: 104 MG/DL (ref 0–100)
MICROALBUMIN UR-MCNC: <0.5 MG/DL
MICROALBUMIN/CREAT UR-RTO: NORMAL MG/G (ref 0–30)
POTASSIUM SERPL-SCNC: 4.1 MMOL/L (ref 3.5–5.1)
SODIUM SERPL-SCNC: 140 MMOL/L (ref 136–145)

## 2021-06-10 NOTE — PROGRESS NOTES
Ban Mann MD      Patient Information  Date:6/9/2021  Name : Marky Nunez 32 y.o.     YOB: 1995         Referred by: Unknown (Inactive)       Chief Complaint   Patient presents with    Diabetes       History of Present Illness: Marky Nunez is a 32 y.o. female here for follow up     Type 1 diabetes mellitus, on insulin pump  She was on Dexcom while she was pregnant, having difficulty getting Dexcom and to upgrade the tandem pump  She is working now  Reports fluctuating blood glucose, hypoglycemia with increased activity. Blood glucose are ranging from 200-250  Most of the carb intake is later in the evening after work  She is checking 4 times daily,  Sometimes missing meals  Carbohydrates are ranging from  g per meal    No chest pain, shortness of breath        She was diagnosed with type 1 diabetes mellitus at age 10. Prior hx    She was seen by Dr. Raul Milligan, pediatric endocrinologist . Steven Antunez took her baby  and she is here with Diabetic Service Dog. She has seen several endocrinologists . 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 Before she had the Diabetic Service Dog, she had multiple hospitalizations. Wt Readings from Last 3 Encounters:   06/09/21 198 lb (89.8 kg)   01/06/21 197 lb (89.4 kg)   07/12/19 197 lb (89.4 kg)       BP Readings from Last 3 Encounters:   06/09/21 103/67   01/06/21 92/70   07/12/19 98/66           Past Medical History:   Diagnosis Date    Asperger's syndrome     Asthma     Bipolar 1 disorder ([facility] [address]     Depression     Diabetes ([facility] [address]     E-coli UTI     Endocrine disease     diabetes    FHx: allergies     Hx of blood clots 2020    Manic depression (HCC)     Vision decreased      Current Outpatient Medications   Medication Sig    glucagon (GlucaGen HypoKit) 1 mg injection INJECT 1 ML INTRAMUSCULARLY ONCE FOR 1 DOSE.     glucose blood VI test strips (Contour Next Test Strips) strip TEST BLOOD GLUCOSE 6 TIMES DAILY DX CODE: E10.65    insulin aspart U-100 (NovoLOG U-100 Insulin aspart) 100 unit/mL injection Use with insulin pump Max units daily: 100    norethindrone (Incassia) 0.35 mg tab Take 1 Tab by mouth daily.  busPIRone (BUSPAR) 10 mg tablet Take 10 mg by mouth two (2) times a day.  Blood-Glucose Meter (CONTOUR NEXT METER) misc Test blood glucose 4x daily    sertraline (ZOLOFT) 100 mg tablet Take 1 Tab by mouth daily. Indications: DEPRESSION    apixaban (Eliquis) 5 mg tablet Take 5 mg by mouth two (2) times a day.  levETIRAcetam (Keppra) 500 mg tablet Take  by mouth two (2) times a day.  albuterol (VENTOLIN HFA) 90 mcg/actuation inhaler Take  by inhalation.  ferrous sulfate (IRON) 325 mg (65 mg iron) EC tablet Take 1 tablet by mouthdaily (with meals),     No current facility-administered medications for this visit. Allergies   Allergen Reactions    Latex Rash    Other Food Itching     Califlower    Banana Itching and Nausea and Vomiting    Benadryl [Diphenhydramine Hcl] Rash    Children's Tylenol [Acetaminophen] Rash    Honey Itching     Itchy tounge    Kiwi Itching and Nausea and Vomiting    Lactose Nausea and Vomiting     Only milk is the issue    Peach (Prunus Persica) Itching and Nausea and Vomiting     Allergic reactions to raw peaches    Pineapple Itching and Nausea and Vomiting    Strawberry Itching and Nausea and Vomiting     Allergies to raw strawberries         Review of Systems:  - Per HPI  -     Physical Examination:   Blood pressure 103/67, pulse 97, temperature 97.9 °F (36.6 °C), height 5' (1.524 m), weight 198 lb (89.8 kg), SpO2 97 %, unknown if currently breastfeeding. Estimated body mass index is 38.67 kg/m² as calculated from the following:    Height as of this encounter: 5' (1.524 m). -   Weight as of this encounter: 198 lb (89.8 kg).   - General: pleasant, no distress, good eye contact  - HEENT: no exophthalmos, no periorbital edema, EOMI  - Neck: No thyromegaly  - CVS: S1-S2 regular  - RS: Normal respiratory effort  - Musculoskeletal: no tremors  - Neurological: alert and oriented  - Psychiatric: normal mood and affect  - Skin: Normal color    Diabetic foot exam: March 2019    Left:     Vibratory sensation normal    Filament test normal sensation with micro filament   Pulse DP: 1+    Deformities: None  Right:    Vibratory sensation normal   Filament test normal sensation with micro filament   Pulse DP: 1+   Deformities: None    Data Reviewed:       Lab Results   Component Value Date/Time    Hemoglobin A1c 9.9 (H) 01/06/2021 09:31 AM    Hemoglobin A1c 8.0 (H) 08/02/2019 12:00 AM    Hemoglobin A1c 8.1 (H) 03/29/2019 04:23 PM    Glucose 81 01/06/2021 09:31 AM    Glucose 290 (H) 12/15/2013 06:00 AM    Glucose (POC) 215 (H) 01/05/2014 07:30 PM    Glucose  07/12/2019 11:16 AM    Microalbumin/Creat ratio (mg/g creat) 10 01/06/2021 09:31 AM    Microalbumin,urine random 1.19 01/06/2021 09:31 AM    LDL,Direct 129 (H) 01/05/2015 02:18 PM    LDL, calculated 81 05/03/2016 03:52 PM    Creatinine (POC) 0.8 11/04/2014 03:12 PM    Creatinine 0.72 01/06/2021 09:31 AM      Lab Results   Component Value Date/Time    Cholesterol, total 169 05/03/2016 03:52 PM    HDL Cholesterol 74 05/03/2016 03:52 PM    LDL,Direct 129 (H) 01/05/2015 02:18 PM    LDL, calculated 81 05/03/2016 03:52 PM    Triglyceride 68 05/03/2016 03:52 PM    CHOL/HDL Ratio 2.6 12/15/2013 06:00 AM     Lab Results   Component Value Date/Time    ALT (SGPT) 18 01/06/2021 09:31 AM    Alk.  phosphatase 153 (H) 01/06/2021 09:31 AM    Bilirubin, total 0.3 01/06/2021 09:31 AM     Lab Results   Component Value Date/Time    GFR est AA >60 01/06/2021 09:31 AM    GFR est non-AA >60 01/06/2021 09:31 AM    Creatinine (POC) 0.8 11/04/2014 03:12 PM    Creatinine 0.72 01/06/2021 09:31 AM    BUN 13 01/06/2021 09:31 AM    Sodium 140 01/06/2021 09:31 AM Potassium 4.2 01/06/2021 09:31 AM    Chloride 107 01/06/2021 09:31 AM    CO2 27 01/06/2021 09:31 AM      Lab Results   Component Value Date/Time    TSH 0.37 01/06/2021 09:31 AM    Triiodothyronine (T3), free 2.3 04/02/2013 12:00 AM    T4, Free 1.47 08/02/2019 12:00 AM    TSH, External 0.187 03/29/2019 12:00 AM        Assessment/Plan:     1. Hyperglycemia due to type 1 diabetes mellitus ([facility] [address]        1. Type 1 Diabetes Mellitus   Lab Results   Component Value Date/Time    Hemoglobin A1c 9.9 (H) 01/06/2021 09:31 AM    Hemoglobin A1c (POC) 7.1 07/12/2019 11:16 AM     Uncontrolled diabetes mellitus, stressed dietary adherence  Bolus for all calorie intake  Not able to get tandem control IQ approved by insurance  Patient is checking the blood glucose 4 times a day. Patient is taking insulin 3 or more times per day via insulin pump  Patient is adjusting the insulin based on the blood glucose checks. Patient is at risk for hypoglycemia. I recommend continuous glucose monitor to help manage the diabetes better  Stressed importance of reducing the carbohydrates to less than 50 g per meal, to increase the protein        2 Obesity:Body mass index is 38.67 kg/m². 3 depression - stable    Subclinical hyperthyroidism: Resolved      I spent 30 minutes caring for this patient, reviewing labs,  insulin pump download, as well as documentation    There are no Patient Instructions on file for this visit. Follow-up and Dispositions    · Return in about 3 months (around 9/9/2021) for labs before next visit and follow up. Thank you for allowing me to participate in the care of this patient.     Hemanth Holman MD    Patient verbalized understanding

## 2021-09-10 LAB
BUN SERPL-MCNC: 14 MG/DL (ref 6–20)
BUN/CREAT SERPL: 20 (ref 9–23)
CALCIUM SERPL-MCNC: 8.8 MG/DL (ref 8.7–10.2)
CHLORIDE SERPL-SCNC: 99 MMOL/L (ref 96–106)
CO2 SERPL-SCNC: 22 MMOL/L (ref 20–29)
CREAT SERPL-MCNC: 0.7 MG/DL (ref 0.57–1)
EST. AVERAGE GLUCOSE BLD GHB EST-MCNC: 209 MG/DL
GLUCOSE SERPL-MCNC: 325 MG/DL (ref 65–99)
HBA1C MFR BLD: 8.9 % (ref 4.8–5.6)
POTASSIUM SERPL-SCNC: 5 MMOL/L (ref 3.5–5.2)
SODIUM SERPL-SCNC: 136 MMOL/L (ref 134–144)

## 2021-09-15 ENCOUNTER — OFFICE VISIT (OUTPATIENT)
Dept: ENDOCRINOLOGY | Age: 26
End: 2021-09-15
Payer: MEDICARE

## 2021-09-15 VITALS
OXYGEN SATURATION: 98 % | HEIGHT: 60 IN | WEIGHT: 207 LBS | HEART RATE: 76 BPM | BODY MASS INDEX: 40.64 KG/M2 | SYSTOLIC BLOOD PRESSURE: 93 MMHG | TEMPERATURE: 96.9 F | RESPIRATION RATE: 18 BRPM | DIASTOLIC BLOOD PRESSURE: 63 MMHG

## 2021-09-15 DIAGNOSIS — E10.65 HYPERGLYCEMIA DUE TO TYPE 1 DIABETES MELLITUS (HCC): Primary | ICD-10-CM

## 2021-09-15 DIAGNOSIS — E28.2 PCOS (POLYCYSTIC OVARIAN SYNDROME): ICD-10-CM

## 2021-09-15 PROCEDURE — 95251 CONT GLUC MNTR ANALYSIS I&R: CPT | Performed by: INTERNAL MEDICINE

## 2021-09-15 PROCEDURE — G9717 DOC PT DX DEP/BP F/U NT REQ: HCPCS | Performed by: INTERNAL MEDICINE

## 2021-09-15 PROCEDURE — 99215 OFFICE O/P EST HI 40 MIN: CPT | Performed by: INTERNAL MEDICINE

## 2021-09-15 PROCEDURE — 2022F DILAT RTA XM EVC RTNOPTHY: CPT | Performed by: INTERNAL MEDICINE

## 2021-09-15 PROCEDURE — G8417 CALC BMI ABV UP PARAM F/U: HCPCS | Performed by: INTERNAL MEDICINE

## 2021-09-15 PROCEDURE — G8427 DOCREV CUR MEDS BY ELIG CLIN: HCPCS | Performed by: INTERNAL MEDICINE

## 2021-09-15 PROCEDURE — 3052F HG A1C>EQUAL 8.0%<EQUAL 9.0%: CPT | Performed by: INTERNAL MEDICINE

## 2021-09-15 NOTE — PATIENT INSTRUCTIONS
In case pump fails switch to daily injections    Lantus 30 units once daily     Novolog carb ratio 1: 10     Correction factor is 20

## 2021-09-15 NOTE — PROGRESS NOTES
Michael Recinos is a 32 y.o. female here for   Chief Complaint   Patient presents with    Diabetes       1. Have you been to the ER, urgent care clinic since your last visit? Hospitalized since your last visit? -no    2. Have you seen or consulted any other health care providers outside of the [address] [place] [address] since your last visit?   Include any pap smears or colon screening.-no

## 2021-09-15 NOTE — PROGRESS NOTES
Hernandez Cantrell MD      Patient Information  Date:9/15/2021  Name : Ernesto Nix 32 y.o.     YOB: 1995         Referred by: Unknown (Inactive)       Chief Complaint   Patient presents with    Diabetes       History of Present Illness: Ernesto Nix is a 32 y.o. female here for follow up     Type 1 diabetes mellitus, on insulin pump  On tandem control IQ,                                                                                                                            Blood glucose are ranging from 200-250  Most of the carb intake is later in the evening after work  She is checking 4 times daily,    Carbohydrates are ranging from  g per meal    No chest pain, shortness of breath        She was diagnosed with type 1 diabetes mellitus at age 10. Prior hx    She was seen by Dr. Jones Willis, pediatric endocrinologist . [employer] took her baby  and she is here with Diabetic Service Dog. She has seen several endocrinologists . 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 Before she had the Diabetic Service Dog, she had multiple hospitalizations.               Wt Readings from Last 3 Encounters:   09/15/21 207 lb (93.9 kg)   06/09/21 198 lb (89.8 kg)   01/06/21 197 lb (89.4 kg)       BP Readings from Last 3 Encounters:   09/15/21 93/63   06/09/21 103/67   01/06/21 92/70           Past Medical History:   Diagnosis Date    Asperger's syndrome     Asthma     Bipolar 1 disorder ([facility] [facility]     Depression     Diabetes ([facility] [address]     E-coli UTI     Endocrine disease     diabetes    FHx: allergies     Hx of blood clots 2020    Manic depression (HCC)     Vision decreased      Current Outpatient Medications   Medication Sig    insulin aspart U-100 (NovoLOG U-100 Insulin aspart) 100 unit/mL injection USE WITH INSULIN PUMP MAX UNITS DAILY: 100    glucagon (GlucaGen HypoKit) 1 mg injection INJECT 1 ML INTRAMUSCULARLY ONCE FOR 1 DOSE.  glucose blood VI test strips (Contour Next Test Strips) strip TEST BLOOD GLUCOSE 6 TIMES DAILY DX CODE: E10.65    norethindrone (Incassia) 0.35 mg tab Take 1 Tab by mouth daily.  busPIRone (BUSPAR) 10 mg tablet Take 10 mg by mouth two (2) times a day.  Blood-Glucose Meter (CONTOUR NEXT METER) misc Test blood glucose 4x daily    sertraline (ZOLOFT) 100 mg tablet Take 1 Tab by mouth daily. Indications: DEPRESSION     No current facility-administered medications for this visit. Allergies   Allergen Reactions    Latex Rash    Other Food Itching     Califlower    Banana Itching and Nausea and Vomiting    Benadryl [Diphenhydramine Hcl] Rash    Children's Tylenol [Acetaminophen] Rash    Honey Itching     Itchy tounge    Kiwi Itching and Nausea and Vomiting    Lactose Nausea and Vomiting     Only milk is the issue    Peach (Prunus Persica) Itching and Nausea and Vomiting     Allergic reactions to raw peaches    Pineapple Itching and Nausea and Vomiting    Strawberry Itching and Nausea and Vomiting     Allergies to raw strawberries         Review of Systems:  - Per HPI  -     Physical Examination:   Blood pressure 93/63, pulse 76, temperature 96.9 °F (36.1 °C), temperature source Temporal, resp. rate 18, height 5' (1.524 m), weight 207 lb (93.9 kg), SpO2 98 %, unknown if currently breastfeeding. Estimated body mass index is 40.43 kg/m² as calculated from the following:    Height as of this encounter: 5' (1.524 m). -   Weight as of this encounter: 207 lb (93.9 kg).   - General: pleasant, no distress, good eye contact  - HEENT: no exophthalmos, no periorbital edema, EOMI  - Neck: No thyromegaly  - CVS: S1-S2 regular  - RS: Normal respiratory effort  - Musculoskeletal: no tremors  - Neurological: alert and oriented  - Psychiatric: normal mood and affect  - Skin: Normal color      Data Reviewed:       Lab Results   Component Value Date/Time    Hemoglobin A1c 8.9 (H) 09/09/2021 12:00 AM    Hemoglobin A1c 10.0 (H) 06/09/2021 10:24 AM    Hemoglobin A1c 9.9 (H) 01/06/2021 09:31 AM    Glucose 325 (H) 09/09/2021 12:00 AM    Glucose 290 (H) 12/15/2013 06:00 AM    Glucose (POC) 215 (H) 01/05/2014 07:30 PM    Glucose  07/12/2019 11:16 AM    Microalbumin/Creat ratio (mg/g creat)  06/09/2021 10:24 AM     Cannot calculate ratio due to microalbumin result outside reportable range. Microalbumin,urine random <0.50 06/09/2021 10:24 AM    LDL,Direct 104 (H) 06/09/2021 10:24 AM    LDL, calculated 81 05/03/2016 03:52 PM    Creatinine (POC) 0.8 11/04/2014 03:12 PM    Creatinine 0.70 09/09/2021 12:00 AM      Lab Results   Component Value Date/Time    Cholesterol, total 169 05/03/2016 03:52 PM    HDL Cholesterol 74 05/03/2016 03:52 PM    LDL,Direct 104 (H) 06/09/2021 10:24 AM    LDL, calculated 81 05/03/2016 03:52 PM    Triglyceride 68 05/03/2016 03:52 PM    CHOL/HDL Ratio 2.6 12/15/2013 06:00 AM     Lab Results   Component Value Date/Time    ALT (SGPT) 18 01/06/2021 09:31 AM    Alk. phosphatase 153 (H) 01/06/2021 09:31 AM    Bilirubin, total 0.3 01/06/2021 09:31 AM     Lab Results   Component Value Date/Time    GFR est  09/09/2021 12:00 AM    GFR est non- 09/09/2021 12:00 AM    Creatinine (POC) 0.8 11/04/2014 03:12 PM    Creatinine 0.70 09/09/2021 12:00 AM    BUN 14 09/09/2021 12:00 AM    Sodium 136 09/09/2021 12:00 AM    Potassium 5.0 09/09/2021 12:00 AM    Chloride 99 09/09/2021 12:00 AM    CO2 22 09/09/2021 12:00 AM      Lab Results   Component Value Date/Time    TSH 0.37 01/06/2021 09:31 AM    Triiodothyronine (T3), free 2.3 04/02/2013 12:00 AM    T4, Free 1.47 08/02/2019 12:00 AM    TSH, External 0.187 03/29/2019 12:00 AM        Assessment/Plan:     1. Hyperglycemia due to type 1 diabetes mellitus ([facility] [address]    2. PCOS (polycystic ovarian syndrome)        1.  Type 1 Diabetes Mellitus   Lab Results   Component Value Date/Time    Hemoglobin A1c 8.9 (H) 09/09/2021 12:00 AM Hemoglobin A1c (POC) 7.1 07/12/2019 11:16 AM     Uncontrolled diabetes mellitus, stressed dietary adherence  Bolus for all calorie intake, she is still not taking bolus for meals    tandem control IQ   Patient is checking the blood glucose 4 times a day. Patient is taking insulin 3 or more times per day via insulin pump  Patient is adjusting the insulin based on the blood glucose checks. Patient is at risk for hypoglycemia. I recommend continuous glucose monitor to help manage the diabetes better  Stressed importance of reducing the carbohydrates to less than 50 g per meal, to increase the protein    Continuous glucose monitor data downloaded for 2 weeks and reviewed with the patient  Noted postprandial hyperglycemia  No severe hypoglycemia      In case pump fails switch to daily injections    Lantus 30 units once daily     Novolog carb ratio is 1: 10     Correction factor is 20 , target 120     2 Obesity:Body mass index is 40.43 kg/m². 3 depression - stable    Subclinical hyperthyroidism: Resolved      I spent >40 minutes caring for this patient, reviewing labs,  insulin pump download, as well as documentation    There are no Patient Instructions on file for this visit. Follow-up and Dispositions    · Return in about 4 months (around 1/15/2022) for labs before next visit and follow up. Thank you for allowing me to participate in the care of this patient.     Storm Patel MD    Patient verbalized understanding

## 2021-12-27 ENCOUNTER — PATIENT MESSAGE (OUTPATIENT)
Dept: ENDOCRINOLOGY | Age: 26
End: 2021-12-27

## 2021-12-27 DIAGNOSIS — E28.2 PCOS (POLYCYSTIC OVARIAN SYNDROME): ICD-10-CM

## 2021-12-27 DIAGNOSIS — E10.65 HYPERGLYCEMIA DUE TO TYPE 1 DIABETES MELLITUS (HCC): Primary | ICD-10-CM

## 2021-12-27 NOTE — TELEPHONE ENCOUNTER
Order placed for pt per verbal order with read back from Dr. Wade Rutherford 12/27/21    Order mailed to pt.

## 2022-01-11 LAB
BUN SERPL-MCNC: 17 MG/DL (ref 6–20)
BUN/CREAT SERPL: 29 (ref 9–23)
CALCIUM SERPL-MCNC: 8.9 MG/DL (ref 8.7–10.2)
CHLORIDE SERPL-SCNC: 102 MMOL/L (ref 96–106)
CO2 SERPL-SCNC: 25 MMOL/L (ref 20–29)
CREAT SERPL-MCNC: 0.59 MG/DL (ref 0.57–1)
EST. AVERAGE GLUCOSE BLD GHB EST-MCNC: 220 MG/DL
GLUCOSE SERPL-MCNC: 175 MG/DL (ref 65–99)
HBA1C MFR BLD: 9.3 % (ref 4.8–5.6)
LDLC SERPL DIRECT ASSAY-MCNC: 95 MG/DL (ref 0–99)
POTASSIUM SERPL-SCNC: 4.3 MMOL/L (ref 3.5–5.2)
SODIUM SERPL-SCNC: 141 MMOL/L (ref 134–144)

## 2022-01-19 ENCOUNTER — OFFICE VISIT (OUTPATIENT)
Dept: ENDOCRINOLOGY | Age: 27
End: 2022-01-19
Payer: MEDICARE

## 2022-01-19 VITALS
TEMPERATURE: 97.1 F | DIASTOLIC BLOOD PRESSURE: 78 MMHG | SYSTOLIC BLOOD PRESSURE: 106 MMHG | BODY MASS INDEX: 42.76 KG/M2 | OXYGEN SATURATION: 99 % | HEART RATE: 78 BPM | RESPIRATION RATE: 20 BRPM | HEIGHT: 60 IN | WEIGHT: 217.8 LBS

## 2022-01-19 DIAGNOSIS — E28.2 PCOS (POLYCYSTIC OVARIAN SYNDROME): ICD-10-CM

## 2022-01-19 DIAGNOSIS — E10.65 HYPERGLYCEMIA DUE TO TYPE 1 DIABETES MELLITUS (HCC): Primary | ICD-10-CM

## 2022-01-19 DIAGNOSIS — E10.65 HYPERGLYCEMIA DUE TO TYPE 1 DIABETES MELLITUS (HCC): ICD-10-CM

## 2022-01-19 DIAGNOSIS — E66.01 MORBID OBESITY (HCC): ICD-10-CM

## 2022-01-19 LAB — GLUCOSE POC: 63 MG/DL

## 2022-01-19 PROCEDURE — 99215 OFFICE O/P EST HI 40 MIN: CPT | Performed by: INTERNAL MEDICINE

## 2022-01-19 PROCEDURE — G8417 CALC BMI ABV UP PARAM F/U: HCPCS | Performed by: INTERNAL MEDICINE

## 2022-01-19 PROCEDURE — G8427 DOCREV CUR MEDS BY ELIG CLIN: HCPCS | Performed by: INTERNAL MEDICINE

## 2022-01-19 PROCEDURE — 3046F HEMOGLOBIN A1C LEVEL >9.0%: CPT | Performed by: INTERNAL MEDICINE

## 2022-01-19 PROCEDURE — G9717 DOC PT DX DEP/BP F/U NT REQ: HCPCS | Performed by: INTERNAL MEDICINE

## 2022-01-19 PROCEDURE — 82962 GLUCOSE BLOOD TEST: CPT | Performed by: INTERNAL MEDICINE

## 2022-01-19 PROCEDURE — 95251 CONT GLUC MNTR ANALYSIS I&R: CPT | Performed by: INTERNAL MEDICINE

## 2022-01-19 PROCEDURE — 2022F DILAT RTA XM EVC RTNOPTHY: CPT | Performed by: INTERNAL MEDICINE

## 2022-01-19 RX ORDER — INSULIN ASPART 100 [IU]/ML
INJECTION, SOLUTION INTRAVENOUS; SUBCUTANEOUS
Qty: 30 ML | Refills: 11 | Status: SHIPPED | OUTPATIENT
Start: 2022-01-19 | End: 2022-07-10

## 2022-01-19 RX ORDER — DICLOFENAC SODIUM 75 MG/1
TABLET, DELAYED RELEASE ORAL
COMMUNITY
End: 2022-09-16

## 2022-01-19 NOTE — PROGRESS NOTES
César Colmenares MD      Patient Information  Date:1/19/2022  Name : Fazal Arita 32 y.o.     YOB: 1995         Referred by: Unknown (Inactive)       Chief Complaint   Patient presents with    Diabetes       History of Present Illness: Fazal Arita is a 32 y.o. female here for follow up     Type 1 diabetes mellitus, on insulin pump  On tandem control IQ,   was having difficulty getting the Dexcom from supplier, did not check the blood glucose then. On food stamps , high carb diet                                                                                     blood glucose was 60 in office, symptomatic, she ate breakfast and did not eat anything till 3 PM.Hypoglycemia treated                                    Blood glucose are ranging from 100-250  She is not working anymore  She now has Dexcom and using the tandem control IQ    Carbohydrates are ranging from  g per meal    No chest pain, shortness of breath    She was diagnosed with type 1 diabetes mellitus at age 10. Prior hx    She was seen by Dr. Brown Govea, pediatric endocrinologist . [employer] took her baby  and she is here with Diabetic Service Dog. She has seen several endocrinologists . 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 Before she had the Diabetic Service Dog, she had multiple hospitalizations.               Wt Readings from Last 3 Encounters:   01/19/22 217 lb 12.8 oz (98.8 kg)   09/15/21 207 lb (93.9 kg)   06/09/21 198 lb (89.8 kg)       BP Readings from Last 3 Encounters:   01/19/22 106/78   09/15/21 93/63   06/09/21 103/67           Past Medical History:   Diagnosis Date    Asperger's syndrome     Asthma     Bipolar 1 disorder ([facility] [address]     Depression     Diabetes ([facility] [address]     E-coli UTI     Endocrine disease     diabetes    FHx: allergies     Hx of blood clots 2020    Manic depression ([address]     Vision decreased      Current Outpatient Medications   Medication Sig    diclofenac EC (VOLTAREN) 75 mg EC tablet diclofenac sodium 75 mg tablet,delayed release    glucagon (GlucaGen HypoKit) 1 mg injection INJECT 1 ML INTRAMUSCULARLY ONCE FOR 1 DOSE.  glucose blood VI test strips (Contour Next Test Strips) strip TEST BLOOD GLUCOSE 6 TIMES DAILY DX CODE: E10.65    norethindrone (Incassia) 0.35 mg tab Take 1 Tab by mouth daily.  busPIRone (BUSPAR) 10 mg tablet Take 10 mg by mouth three (3) times daily.  Blood-Glucose Meter (CONTOUR NEXT METER) misc Test blood glucose 4x daily    sertraline (ZOLOFT) 100 mg tablet Take 1 Tab by mouth daily. Indications: DEPRESSION (Patient taking differently: Take 250 mg by mouth daily. Indications: depression)    insulin aspart U-100 (NovoLOG U-100 Insulin aspart) 100 unit/mL injection USE WITH INSULIN PUMP MAX UNITS DAILY: 100     No current facility-administered medications for this visit. Allergies   Allergen Reactions    Latex Rash    Other Food Itching     Califlower    Banana Itching and Nausea and Vomiting    Benadryl [Diphenhydramine Hcl] Rash    Children's Tylenol [Acetaminophen] Rash    Honey Itching     Itchy tounge    Kiwi Itching and Nausea and Vomiting    Lactose Nausea and Vomiting     Only milk is the issue    Peach (Prunus Persica) Itching and Nausea and Vomiting     Allergic reactions to raw peaches    Pineapple Itching and Nausea and Vomiting    Strawberry Itching and Nausea and Vomiting     Allergies to raw strawberries         Review of Systems:  - Per HPI  -     Physical Examination:   Blood pressure 106/78, pulse 78, temperature 97.1 °F (36.2 °C), temperature source Temporal, resp. rate 20, height 5' (1.524 m), weight 217 lb 12.8 oz (98.8 kg), SpO2 99 %, unknown if currently breastfeeding. Estimated body mass index is 42.54 kg/m² as calculated from the following:    Height as of this encounter: 5' (1.524 m).   -   Weight as of this encounter: 217 lb 12.8 oz (98.8 kg). - General: pleasant, no distress, good eye contact  - HEENT: no exophthalmos, no periorbital edema, EOMI  - Neck: No thyromegaly  - CVS: S1-S2 regular  - RS: Normal respiratory effort  - Musculoskeletal: no tremors  - Neurological: alert and oriented  - Psychiatric: normal mood and affect  - Skin: Normal color      Data Reviewed:       Lab Results   Component Value Date/Time    Hemoglobin A1c 9.3 (H) 01/10/2022 12:57 PM    Hemoglobin A1c 8.9 (H) 09/09/2021 12:00 AM    Hemoglobin A1c 10.0 (H) 06/09/2021 10:24 AM    Glucose 175 (H) 01/10/2022 12:57 PM    Glucose 290 (H) 12/15/2013 06:00 AM    Glucose (POC) 215 (H) 01/05/2014 07:30 PM    Glucose POC 63 01/19/2022 04:15 PM    Microalbumin/Creat ratio (mg/g creat)  06/09/2021 10:24 AM     Cannot calculate ratio due to microalbumin result outside reportable range. Microalbumin,urine random <0.50 06/09/2021 10:24 AM    LDL,Direct 95 01/10/2022 12:57 PM    LDL, calculated 81 05/03/2016 03:52 PM    Creatinine (POC) 0.8 11/04/2014 03:12 PM    Creatinine 0.59 01/10/2022 12:57 PM      Lab Results   Component Value Date/Time    Cholesterol, total 169 05/03/2016 03:52 PM    HDL Cholesterol 74 05/03/2016 03:52 PM    LDL,Direct 95 01/10/2022 12:57 PM    LDL, calculated 81 05/03/2016 03:52 PM    Triglyceride 68 05/03/2016 03:52 PM    CHOL/HDL Ratio 2.6 12/15/2013 06:00 AM     Lab Results   Component Value Date/Time    ALT (SGPT) 18 01/06/2021 09:31 AM    Alk.  phosphatase 153 (H) 01/06/2021 09:31 AM    Bilirubin, total 0.3 01/06/2021 09:31 AM     Lab Results   Component Value Date/Time    GFR est  01/10/2022 12:57 PM    GFR est non- 01/10/2022 12:57 PM    Creatinine (POC) 0.8 11/04/2014 03:12 PM    Creatinine 0.59 01/10/2022 12:57 PM    BUN 17 01/10/2022 12:57 PM    Sodium 141 01/10/2022 12:57 PM    Potassium 4.3 01/10/2022 12:57 PM    Chloride 102 01/10/2022 12:57 PM    CO2 25 01/10/2022 12:57 PM      Lab Results   Component Value Date/Time    TSH 0.37 01/06/2021 09:31 AM    Triiodothyronine (T3), free 2.3 04/02/2013 12:00 AM    T4, Free 1.47 08/02/2019 12:00 AM    TSH, External 0.187 03/29/2019 12:00 AM        Assessment/Plan:     1. Hyperglycemia due to type 1 diabetes mellitus ([facility] [address]    2. PCOS (polycystic ovarian syndrome)    3. Morbid obesity ([facility] [address]        1. Type 1 Diabetes Mellitus   Lab Results   Component Value Date/Time    Hemoglobin A1c 9.3 (H) 01/10/2022 12:57 PM    Hemoglobin A1c (POC) 7.1 07/12/2019 11:16 AM     Worsening control, dietary adherence  Bolus for all calorie intake/meals , wide fluctuation in the blood glucose   tandem control IQ   Patient is checking the blood glucose 4 times a day. Patient is taking insulin 3 or more times per day via insulin pump  Patient is adjusting the insulin based on the blood glucose checks. Patient is at risk for hypoglycemia. I recommend continuous glucose monitor to help manage the diabetes better  Stressed importance of reducing the carbohydrates to less than 50 g per meal, to increase the protein    Continuous glucose monitor data downloaded for 2 weeks and reviewed with the patient  Noted postprandial hyperglycemia  No severe hypoglycemia  High risk for hypoglycemia due to varying meal patterns      In case pump fails switch to daily injections    Lantus 30 units once daily     Novolog carb ratio is 1: 10     Correction factor is 20 , target 120     2 Obesity:Body mass index is 42.54 kg/m². 3 depression - stable        There are no Patient Instructions on file for this visit. Follow-up and Dispositions    · Return in about 4 months (around 5/19/2022) for labs before next visit and follow up. Thank you for allowing me to participate in the care of this patient.     Jamal Gonzales MD    Patient verbalized understanding

## 2022-01-19 NOTE — PROGRESS NOTES
Mary Monte is a 32 y.o. female here for   Chief Complaint   Patient presents with    Diabetes       1. Have you been to the ER, urgent care clinic since your last visit? Hospitalized since your last visit? - no    2. Have you seen or consulted any other health care providers outside of the [address] [place] [address] since your last visit? Include any pap smears or colon screening.- ortho for broken pinky    Pt BG checked at 4:15P 63    Pt asked to eat peanut butter crackers and apple juice per physician.  Pt instructed to wait 20 min then recheck BG

## 2022-03-18 PROBLEM — K21.9 GERD (GASTROESOPHAGEAL REFLUX DISEASE): Status: ACTIVE | Noted: 2019-04-11

## 2022-03-19 PROBLEM — R56.9 SEIZURES (HCC): Status: ACTIVE | Noted: 2019-04-11

## 2022-03-19 PROBLEM — Z34.80 SUPERVISION OF OTHER NORMAL PREGNANCY: Status: ACTIVE | Noted: 2019-04-08

## 2022-03-19 PROBLEM — F41.9 ANXIETY: Status: ACTIVE | Noted: 2019-04-11

## 2022-03-19 PROBLEM — E66.01 SEVERE OBESITY (HCC): Status: ACTIVE | Noted: 2019-03-29

## 2022-03-19 PROBLEM — F84.0 AUTISM SPECTRUM DISORDER: Status: ACTIVE | Noted: 2019-04-11

## 2022-05-16 ENCOUNTER — OFFICE VISIT (OUTPATIENT)
Dept: ENDOCRINOLOGY | Age: 27
End: 2022-05-16
Payer: MEDICAID

## 2022-05-16 VITALS
OXYGEN SATURATION: 99 % | TEMPERATURE: 97.4 F | SYSTOLIC BLOOD PRESSURE: 113 MMHG | RESPIRATION RATE: 16 BRPM | HEIGHT: 60 IN | BODY MASS INDEX: 45.35 KG/M2 | DIASTOLIC BLOOD PRESSURE: 64 MMHG | WEIGHT: 231 LBS | HEART RATE: 90 BPM

## 2022-05-16 DIAGNOSIS — E66.01 MORBID OBESITY (HCC): ICD-10-CM

## 2022-05-16 DIAGNOSIS — O24.019 TYPE 1 DIABETES MELLITUS COMPLICATING PREGNANCY, ANTEPARTUM: ICD-10-CM

## 2022-05-16 DIAGNOSIS — E10.65 HYPERGLYCEMIA DUE TO TYPE 1 DIABETES MELLITUS (HCC): ICD-10-CM

## 2022-05-16 DIAGNOSIS — E10.65 HYPERGLYCEMIA DUE TO TYPE 1 DIABETES MELLITUS (HCC): Primary | ICD-10-CM

## 2022-05-16 LAB — HBA1C MFR BLD HPLC: 7.4 %

## 2022-05-16 PROCEDURE — 83036 HEMOGLOBIN GLYCOSYLATED A1C: CPT | Performed by: INTERNAL MEDICINE

## 2022-05-16 PROCEDURE — 99215 OFFICE O/P EST HI 40 MIN: CPT | Performed by: INTERNAL MEDICINE

## 2022-05-16 PROCEDURE — 3051F HG A1C>EQUAL 7.0%<8.0%: CPT | Performed by: INTERNAL MEDICINE

## 2022-05-16 PROCEDURE — 95251 CONT GLUC MNTR ANALYSIS I&R: CPT | Performed by: INTERNAL MEDICINE

## 2022-05-16 NOTE — PROGRESS NOTES
Ruchi Anton is a 32 y.o. female here for   Chief Complaint   Patient presents with    Diabetes       1. Have you been to the ER, urgent care clinic since your last visit? Hospitalized since your last visit? -no    2. Have you seen or consulted any other health care providers outside of the [address] [place] [address] since your last visit? Include any pap smears or colon screening. -PCP [facility] [address]

## 2022-05-16 NOTE — LETTER
5/16/2022    Patient: Michael Recinos   YOB: 1995   Date of Visit: 5/16/2022     Shweta Mckeon, [address] [place] [id number]  Via Fax: [phone]    Dear Shweta Mckeon MD,      Thank you for referring Ms. Marcella Boyle to [address] [place] [address] for evaluation. My notes for this consultation are attached. If you have questions, please do not hesitate to call me. I look forward to following your patient along with you.       Sincerely,    Magdalena Alexander MD

## 2022-05-16 NOTE — PROGRESS NOTES
Nohemi Carcamo MD      Patient Information  Date:5/16/2022  Name : Ines Gutierrez 32 y.o.     YOB: 1995         Referred by: Unknown (Inactive)       Chief Complaint   Patient presents with    Diabetes       History of Present Illness: Ines Gutierrez is a 32 y.o. female here for follow up     Type 1 diabetes mellitus, on insulin pump  On tandem control IQ,    She is using exercise more than forgetting to turn it off, she is on exercise mode 24 hours  Weight gain  Postmeal hyperglycemia, frequent control IQ auto bolus followed by hypoglycemia  Was supposed to use exercise more while at work      Carbohydrates are ranging from  g per meal    No chest pain, shortness of breath    She was diagnosed with type 1 diabetes mellitus at age 10. Prior hx    She was seen by Dr. Mansoor Vasquez, pediatric endocrinologist . Helena Noe took her baby  and she is here with Diabetic Service Dog. She has seen several endocrinologists . 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 Before she had the Diabetic Service Dog, she had multiple hospitalizations. Wt Readings from Last 3 Encounters:   05/16/22 231 lb (104.8 kg)   01/19/22 217 lb 12.8 oz (98.8 kg)   09/15/21 207 lb (93.9 kg)       BP Readings from Last 3 Encounters:   05/16/22 113/64   01/19/22 106/78   09/15/21 93/63           Past Medical History:   Diagnosis Date    Asperger's syndrome     Asthma     Bipolar 1 disorder ([address]     Depression     Diabetes ([address]     E-coli UTI     Endocrine disease     diabetes    FHx: allergies     Hx of blood clots 2020    Manic depression ([address]     Vision decreased      Current Outpatient Medications   Medication Sig    FLAXSEED PO Take  by mouth daily.  glucosamine HCl/chondroitin heath (GLUCOSAMINE-CHONDROITIN PO) Take  by mouth daily.     insulin aspart U-100 (NovoLOG U-100 Insulin aspart) 100 unit/mL injection USE WITH INSULIN PUMP MAX UNITS DAILY: 100    glucagon (GlucaGen HypoKit) 1 mg injection INJECT 1 ML INTRAMUSCULARLY ONCE FOR 1 DOSE.  glucose blood VI test strips (Contour Next Test Strips) strip TEST BLOOD GLUCOSE 6 TIMES DAILY DX CODE: E10.65    norethindrone (Incassia) 0.35 mg tab Take 1 Tab by mouth daily.  busPIRone (BUSPAR) 10 mg tablet Take 10 mg by mouth three (3) times daily.  Blood-Glucose Meter (CONTOUR NEXT METER) misc Test blood glucose 4x daily    sertraline (ZOLOFT) 100 mg tablet Take 1 Tab by mouth daily. Indications: DEPRESSION (Patient taking differently: Take 250 mg by mouth daily. Indications: depression)    diclofenac EC (VOLTAREN) 75 mg EC tablet diclofenac sodium 75 mg tablet,delayed release     No current facility-administered medications for this visit. Allergies   Allergen Reactions    Latex Rash    Other Food Itching     Califlower    Banana Itching and Nausea and Vomiting    Benadryl [Diphenhydramine Hcl] Rash    Children's Tylenol [Acetaminophen] Rash    Honey Itching     Itchy tounge    Kiwi Itching and Nausea and Vomiting    Lactose Nausea and Vomiting     Only milk is the issue    Peach (Prunus Persica) Itching and Nausea and Vomiting     Allergic reactions to raw peaches    Pineapple Itching and Nausea and Vomiting    Strawberry Itching and Nausea and Vomiting     Allergies to raw strawberries         Review of Systems:  - Per HPI  -     Physical Examination:   Blood pressure 113/64, pulse 90, temperature 97.4 °F (36.3 °C), temperature source Temporal, resp. rate 16, height 5' (1.524 m), weight 231 lb (104.8 kg), SpO2 99 %, unknown if currently breastfeeding. Estimated body mass index is 45.11 kg/m² as calculated from the following:    Height as of this encounter: 5' (1.524 m). -   Weight as of this encounter: 231 lb (104.8 kg).   - General: pleasant, no distress, good eye contact  - HEENT: no exophthalmos, no periorbital edema, EOMI  - Neck: No thyromegaly  - CVS: S1-S2 regular  - RS: Normal respiratory effort  - Musculoskeletal: no tremors  - Neurological: alert and oriented  - Psychiatric: normal mood and affect  - Skin: Normal color      Data Reviewed:       Lab Results   Component Value Date/Time    Hemoglobin A1c 9.3 (H) 01/10/2022 12:57 PM    Hemoglobin A1c 8.9 (H) 09/09/2021 12:00 AM    Hemoglobin A1c 10.0 (H) 06/09/2021 10:24 AM    Glucose 175 (H) 01/10/2022 12:57 PM    Glucose 290 (H) 12/15/2013 06:00 AM    Glucose (POC) 215 (H) 01/05/2014 07:30 PM    Glucose POC 63 01/19/2022 04:15 PM    Microalbumin/Creat ratio (mg/g creat)  06/09/2021 10:24 AM     Cannot calculate ratio due to microalbumin result outside reportable range. Microalbumin,urine random <0.50 06/09/2021 10:24 AM    LDL,Direct 95 01/10/2022 12:57 PM    LDL, calculated 81 05/03/2016 03:52 PM    Creatinine (POC) 0.8 11/04/2014 03:12 PM    Creatinine 0.59 01/10/2022 12:57 PM      Lab Results   Component Value Date/Time    Cholesterol, total 169 05/03/2016 03:52 PM    HDL Cholesterol 74 05/03/2016 03:52 PM    LDL,Direct 95 01/10/2022 12:57 PM    LDL, calculated 81 05/03/2016 03:52 PM    Triglyceride 68 05/03/2016 03:52 PM    CHOL/HDL Ratio 2.6 12/15/2013 06:00 AM     Lab Results   Component Value Date/Time    ALT (SGPT) 18 01/06/2021 09:31 AM    Alk.  phosphatase 153 (H) 01/06/2021 09:31 AM    Bilirubin, total 0.3 01/06/2021 09:31 AM     Lab Results   Component Value Date/Time    GFR est  01/10/2022 12:57 PM    GFR est non- 01/10/2022 12:57 PM    Creatinine (POC) 0.8 11/04/2014 03:12 PM    Creatinine 0.59 01/10/2022 12:57 PM    BUN 17 01/10/2022 12:57 PM    Sodium 141 01/10/2022 12:57 PM    Potassium 4.3 01/10/2022 12:57 PM    Chloride 102 01/10/2022 12:57 PM    CO2 25 01/10/2022 12:57 PM      Lab Results   Component Value Date/Time    TSH 0.37 01/06/2021 09:31 AM    Triiodothyronine (T3), free 2.3 04/02/2013 12:00 AM    T4, Free 1.47 08/02/2019 12:00 AM TSH, External 0.187 03/29/2019 12:00 AM        Assessment/Plan:     1. Hyperglycemia due to type 1 diabetes mellitus ([address]    2. PCOS (polycystic ovarian syndrome)        1. Type 1 Diabetes Mellitus   Lab Results   Component Value Date/Time    Hemoglobin A1c 9.3 (H) 01/10/2022 12:57 PM    Hemoglobin A1c (POC) 7.1 07/12/2019 11:16 AM     Severe hyperglycemia, postmeal blood glucose more than 300  Bolus for all calorie intake/meals , wide fluctuation in the blood glucose, limit carbs   tandem control IQ, to use exercise mode only during work  [employer], insulin resistance  Adjusted the settings    Continuous glucose monitor data downloaded for 2 weeks and reviewed with the patient  Noted postprandial hyperglycemia  + hypoglycemia  High risk for severe hypoglycemia due to varying meal patterns      In case pump fails switch to daily injections    Lantus 30 units once daily     Novolog carb ratio is 1: 8    Correction factor is 20 , target 120     2 Obesity:Body mass index is 45.11 kg/m². 3 depression - stable    Morbid obesity: Attributes to OCPs  Need to discuss metformin next visit off label use  Low-carb diet    Spent > 40 minutes on the day of the visit reviewing chart, examining, ordering/reviewing labs, counseling, discussing therapeutics and documentation in the medical record    There are no Patient Instructions on file for this visit. Follow-up and Dispositions    · Return in about 4 months (around 9/16/2022) for labs before next visit and follow up. Thank you for allowing me to participate in the care of this patient.     Sarah Weber MD    Patient verbalized understanding

## 2022-05-17 DIAGNOSIS — E10.65 HYPERGLYCEMIA DUE TO TYPE 1 DIABETES MELLITUS (HCC): Primary | ICD-10-CM

## 2022-09-04 LAB
ALBUMIN/CREAT UR: 14 MG/G CREAT (ref 0–29)
BUN SERPL-MCNC: 9 MG/DL (ref 6–20)
BUN/CREAT SERPL: 13 (ref 9–23)
CALCIUM SERPL-MCNC: 8.9 MG/DL (ref 8.7–10.2)
CHLORIDE SERPL-SCNC: 101 MMOL/L (ref 96–106)
CO2 SERPL-SCNC: 16 MMOL/L (ref 20–29)
CREAT SERPL-MCNC: 0.7 MG/DL (ref 0.57–1)
CREAT UR-MCNC: 147.2 MG/DL
EGFR: 121 ML/MIN/1.73
EST. AVERAGE GLUCOSE BLD GHB EST-MCNC: 186 MG/DL
GLUCOSE SERPL-MCNC: 200 MG/DL (ref 65–99)
HBA1C MFR BLD: 8.1 % (ref 4.8–5.6)
LDLC SERPL DIRECT ASSAY-MCNC: 79 MG/DL (ref 0–99)
MICROALBUMIN UR-MCNC: 19.9 UG/ML
POTASSIUM SERPL-SCNC: 5.5 MMOL/L (ref 3.5–5.2)
SODIUM SERPL-SCNC: 138 MMOL/L (ref 134–144)

## 2022-09-16 ENCOUNTER — OFFICE VISIT (OUTPATIENT)
Dept: ENDOCRINOLOGY | Age: 27
End: 2022-09-16
Payer: MEDICARE

## 2022-09-16 VITALS
HEIGHT: 60 IN | DIASTOLIC BLOOD PRESSURE: 76 MMHG | TEMPERATURE: 97.6 F | SYSTOLIC BLOOD PRESSURE: 129 MMHG | BODY MASS INDEX: 46.82 KG/M2 | WEIGHT: 238.5 LBS | OXYGEN SATURATION: 98 % | HEART RATE: 101 BPM

## 2022-09-16 DIAGNOSIS — E10.65 HYPERGLYCEMIA DUE TO TYPE 1 DIABETES MELLITUS (HCC): Primary | ICD-10-CM

## 2022-09-16 DIAGNOSIS — E66.01 MORBID OBESITY (HCC): ICD-10-CM

## 2022-09-16 PROCEDURE — 99214 OFFICE O/P EST MOD 30 MIN: CPT | Performed by: INTERNAL MEDICINE

## 2022-09-16 PROCEDURE — G9717 DOC PT DX DEP/BP F/U NT REQ: HCPCS | Performed by: INTERNAL MEDICINE

## 2022-09-16 PROCEDURE — 2022F DILAT RTA XM EVC RTNOPTHY: CPT | Performed by: INTERNAL MEDICINE

## 2022-09-16 PROCEDURE — G8427 DOCREV CUR MEDS BY ELIG CLIN: HCPCS | Performed by: INTERNAL MEDICINE

## 2022-09-16 PROCEDURE — G8417 CALC BMI ABV UP PARAM F/U: HCPCS | Performed by: INTERNAL MEDICINE

## 2022-09-16 PROCEDURE — 3052F HG A1C>EQUAL 8.0%<EQUAL 9.0%: CPT | Performed by: INTERNAL MEDICINE

## 2022-09-16 PROCEDURE — 95251 CONT GLUC MNTR ANALYSIS I&R: CPT | Performed by: INTERNAL MEDICINE

## 2022-09-16 RX ORDER — BUTALBITAL, ACETAMINOPHEN AND CAFFEINE 50; 325; 40 MG/1; MG/1; MG/1
TABLET ORAL
COMMUNITY
End: 2022-09-16

## 2022-09-16 RX ORDER — OMEPRAZOLE 20 MG/1
1 TABLET, DELAYED RELEASE ORAL DAILY
COMMUNITY
End: 2022-09-16

## 2022-09-16 RX ORDER — LEVETIRACETAM 500 MG/1
TABLET ORAL
COMMUNITY
Start: 2020-10-22 | End: 2022-09-16

## 2022-09-16 RX ORDER — BROMPHENIRAMINE MALEATE, PSEUDOEPHEDRINE HYDROCHLORIDE, AND DEXTROMETHORPHAN HYDROBROMIDE 2; 30; 10 MG/5ML; MG/5ML; MG/5ML
SYRUP ORAL
COMMUNITY
End: 2022-09-16

## 2022-09-16 RX ORDER — MOMETASONE FUROATE 50 UG/1
SPRAY, METERED NASAL
COMMUNITY
End: 2022-09-16

## 2022-09-16 RX ORDER — ACETAMINOPHEN AND CODEINE PHOSPHATE 120; 12 MG/5ML; MG/5ML
0.35 SOLUTION ORAL
COMMUNITY
Start: 2020-09-28 | End: 2022-09-16

## 2022-09-16 RX ORDER — AMOXICILLIN 500 MG/1
CAPSULE ORAL
COMMUNITY
Start: 2022-08-16 | End: 2022-09-16

## 2022-09-16 RX ORDER — SERTRALINE HYDROCHLORIDE 50 MG/1
TABLET, FILM COATED ORAL
COMMUNITY
Start: 2022-09-09

## 2022-09-16 RX ORDER — CHOLECALCIFEROL (VITAMIN D3) 125 MCG
CAPSULE ORAL
COMMUNITY
Start: 2021-11-09

## 2022-09-16 RX ORDER — PROMETHAZINE HYDROCHLORIDE AND PHENYLEPHRINE HYDROCHLORIDE 6.25; 5 MG/5ML; MG/5ML
SYRUP ORAL
COMMUNITY
End: 2022-09-16

## 2022-09-16 RX ORDER — ONDANSETRON 4 MG/1
TABLET, FILM COATED ORAL
COMMUNITY
End: 2022-09-16

## 2022-09-16 RX ORDER — HYDROCODONE BITARTRATE AND ACETAMINOPHEN 5; 325 MG/1; MG/1
TABLET ORAL
COMMUNITY
Start: 2022-08-16 | End: 2022-09-16

## 2022-09-16 RX ORDER — IPRATROPIUM BROMIDE 42 UG/1
SPRAY, METERED NASAL
COMMUNITY
End: 2022-09-16

## 2022-09-16 RX ORDER — FLUTICASONE PROPIONATE 50 MCG
SPRAY, SUSPENSION (ML) NASAL
COMMUNITY
End: 2022-09-16

## 2022-09-16 RX ORDER — CYCLOBENZAPRINE HCL 5 MG
TABLET ORAL
COMMUNITY
End: 2022-09-16

## 2022-09-16 RX ORDER — NORETHINDRONE 5 MG/1
5 TABLET ORAL DAILY
COMMUNITY
Start: 2022-09-09

## 2022-09-16 RX ORDER — PSEUDOEPHEDRINE HCL 30 MG
TABLET ORAL
COMMUNITY
End: 2022-09-16

## 2022-09-16 NOTE — PROGRESS NOTES
1. Have you been to the ER, urgent care clinic since your last visit? Hospitalized since your last visit? No    2. Have you seen or consulted any other health care providers outside of the [address] [place] [address] since your last visit? Include any pap smears or colon screening.  No  Chief Complaint   Patient presents with    Follow-up    Diabetes

## 2022-09-18 LAB — POTASSIUM SERPL-SCNC: 4.1 MMOL/L (ref 3.5–5.2)

## 2022-09-18 NOTE — PROGRESS NOTES
Matt Moy MD      Patient Information  Date:9/17/2022  Name : Melo Nix 32 y.o.     YOB: 1995         Referred by: None       Chief Complaint   Patient presents with    Follow-up    Diabetes       History of Present Illness: Melo Nix is a 32 y.o. female here for follow up     Type 1 diabetes mellitus, on insulin pump  On tandem control IQ,    She is using exercise more than forgetting to turn it off, she is on exercise mode 24 hours  Weight gain  Postmeal hyperglycemia, frequent control IQ auto bolus followed by hypoglycemia  Was supposed to use exercise more while at work      Carbohydrates are ranging from  g per meal    No chest pain, shortness of breath    She was diagnosed with type 1 diabetes mellitus at age 10. Prior hx    She was seen by Dr. Melissa Perez, pediatric endocrinologist . Kranthi Vazquez took her baby  and she is here with Diabetic Service Dog. She has seen several endocrinologists . 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 Before she had the Diabetic Service Dog, she had multiple hospitalizations. Wt Readings from Last 3 Encounters:   09/16/22 238 lb 8 oz (108.2 kg)   05/16/22 231 lb (104.8 kg)   01/19/22 217 lb 12.8 oz (98.8 kg)       BP Readings from Last 3 Encounters:   09/16/22 129/76   05/16/22 113/64   01/19/22 106/78           Past Medical History:   Diagnosis Date    Asperger's syndrome     Asthma     Bipolar 1 disorder (HCC)     Depression     Di[facility] [address]     E-coli UTI     Endocrine disease     diabetes    FHx: allergies     Hx of blood clots 2020    Manic depression (HCC)     Vision decreased      Current Outpatient Medications   Medication Sig    sertraline (ZOLOFT) 50 mg tablet TAKE 1 TABLET BY ORAL ROUTE 1 TIME PER DAY WITH 200MG DOSE FOR TOTAL DOSE 250MG DAILY.     norethindrone acetate (AYGESTIN) 5 mg tablet Take 5 mg by mouth daily. melatonin 5 mg tablet melatonin 5 mg tablet   TAKE 1-2 TABLETS BY MOUTH AT BEDTIME AS NEEDED    insulin aspart U-100 (NovoLOG U-100 Insulin aspart) 100 unit/mL injection USE WITH INSULIN PUMP MAX UNITS DAILY: 100    FLAXSEED PO Take  by mouth daily. glucosamine HCl/chondroitin heath (GLUCOSAMINE-CHONDROITIN PO) Take  by mouth daily. glucose blood VI test strips (Contour Next Test Strips) strip TEST BLOOD GLUCOSE 6 TIMES DAILY DX CODE: E10.65    busPIRone (BUSPAR) 10 mg tablet Take 10 mg by mouth three (3) times daily. sertraline (ZOLOFT) 100 mg tablet Take 1 Tab by mouth daily. Indications: DEPRESSION (Patient taking differently: Take 250 mg by mouth daily. Indications: depression)    Glucagon Emergency Kit, human, 1 mg solr 1 MG BY INTRAMUSCULAR ROUTE AS NEEDED (HYPOGLYCEMIA). (Patient not taking: Reported on 9/16/2022)    glucagon 1 mg solr 1 mg by IntraMUSCular route as needed (HYPOGLYCEMIA). (Patient not taking: Reported on 9/16/2022)    glucagon (GlucaGen HypoKit) 1 mg injection INJECT 1 ML INTRAMUSCULARLY ONCE FOR 1 DOSE. (Patient not taking: Reported on 9/16/2022)    norethindrone (MICRONOR) 0.35 mg tab Take 1 Tab by mouth daily. (Patient not taking: Reported on 9/16/2022)    Blood-Glucose Meter (CONTOUR NEXT METER) misc Test blood glucose 4x daily (Patient not taking: Reported on 9/16/2022)     No current facility-administered medications for this visit.      Allergies   Allergen Reactions    Latex Rash    Other Food Itching     Califlower    Banana Itching and Nausea and Vomiting    Benadryl [Diphenhydramine Hcl] Rash    Children's Tylenol [Acetaminophen] Rash    Honey Itching     Itchy tounge    Kiwi Itching and Nausea and Vomiting    Lactose Nausea and Vomiting     Only milk is the issue    Peach (Prunus Persica) Itching and Nausea and Vomiting     Allergic reactions to raw peaches    Pineapple Itching and Nausea and Vomiting    Procaine Other (comments)    Strawberry Itching and Nausea and Vomiting     Allergies to raw strawberries         Review of Systems:  Per HPI      Physical Examination:   Blood pressure 129/76, pulse (!) 101, temperature 97.6 °F (36.4 °C), temperature source Temporal, height 5' (1.524 m), weight 238 lb 8 oz (108.2 kg), SpO2 98 %, unknown if currently breastfeeding. Estimated body mass index is 46.58 kg/m² as calculated from the following:    Height as of this encounter: 5' (1.524 m). Weight as of this encounter: 238 lb 8 oz (108.2 kg). General: pleasant, no distress, good eye contact  HEENT: no exophthalmos, no periorbital edema, EOMI  Neck: No thyromegaly  CVS: S1-S2 regular  RS: Normal respiratory effort  Musculoskeletal: no tremors  Neurological: alert and oriented  Psychiatric: normal mood and affect  Skin: Normal color      Data Reviewed:       Lab Results   Component Value Date/Time    Hemoglobin A1c 8.1 (H) 09/02/2022 12:00 AM    Hemoglobin A1c 9.3 (H) 01/10/2022 12:57 PM    Hemoglobin A1c 8.9 (H) 09/09/2021 12:00 AM    Glucose 200 (H) 09/02/2022 12:00 AM    Glucose 290 (H) 12/15/2013 06:00 AM    Glucose (POC) 215 (H) 01/05/2014 07:30 PM    Glucose POC 63 01/19/2022 04:15 PM    Microalbumin/Creat ratio (mg/g creat)  06/09/2021 10:24 AM     Cannot calculate ratio due to microalbumin result outside reportable range.     Microalb/Creat ratio (ug/mg creat.) 14 09/02/2022 12:00 AM    Microalbumin,urine random <0.50 06/09/2021 10:24 AM    LDL,Direct 79 09/02/2022 12:00 AM    LDL, calculated 81 05/03/2016 03:52 PM    Creatinine (POC) 0.8 11/04/2014 03:12 PM    Creatinine 0.70 09/02/2022 12:00 AM      Lab Results   Component Value Date/Time    Cholesterol, total 169 05/03/2016 03:52 PM    HDL Cholesterol 74 05/03/2016 03:52 PM    LDL,Direct 79 09/02/2022 12:00 AM    LDL, calculated 81 05/03/2016 03:52 PM    Triglyceride 68 05/03/2016 03:52 PM    CHOL/HDL Ratio 2.6 12/15/2013 06:00 AM     Lab Results   Component Value Date/Time    ALT (SGPT) 18 01/06/2021 09:31 AM Alk. phosphatase 153 (H) 01/06/2021 09:31 AM    Bilirubin, total 0.3 01/06/2021 09:31 AM     Lab Results   Component Value Date/Time    GFR est  01/10/2022 12:57 PM    GFR est non- 01/10/2022 12:57 PM    Creatinine (POC) 0.8 11/04/2014 03:12 PM    Creatinine 0.70 09/02/2022 12:00 AM    BUN 9 09/02/2022 12:00 AM    Sodium 138 09/02/2022 12:00 AM    Potassium 5.5 (H) 09/02/2022 12:00 AM    Chloride 101 09/02/2022 12:00 AM    CO2 16 (L) 09/02/2022 12:00 AM      Lab Results   Component Value Date/Time    TSH 0.37 01/06/2021 09:31 AM    Triiodothyronine (T3), free 2.3 04/02/2013 12:00 AM    T4, Free 1.47 08/02/2019 12:00 AM    TSH, External 0.187 03/29/2019 12:00 AM        Assessment/Plan:     1. Hyperglycemia due to type 1 diabetes mellitus ([facility]        1. Type 1 Diabetes Mellitus   Lab Results   Component Value Date/Time    Hemoglobin A1c 8.1 (H) 09/02/2022 12:00 AM    Hemoglobin A1c (POC) 7.4 05/16/2022 04:16 PM     Stable control   Bolus for all calorie intake/meals , wide fluctuation in the blood glucose, limit carbs   tandem control IQ, to use exercise mode only during work    Continuous glucose monitor data downloaded for 2 weeks and reviewed with the patient  Noted postprandial hyperglycemia    High risk for severe hypoglycemia due to varying meal patterns      In case pump fails switch to daily injections    Lantus 30 units once daily     Novolog carb ratio is 1: 8    Correction factor is 20 , target 120     2 Obesity:Body mass index is 46.58 kg/m². 3 depression - stable    Morbid obesity:   Low-carb diet      There are no Patient Instructions on file for this visit. Follow-up and Dispositions    Return in about 5 months (around 2/16/2023) for labs before next visit and follow up. Thank you for allowing me to participate in the care of this patient.     Joshua Sanchez MD    Patient verbalized understanding

## 2022-12-02 ENCOUNTER — DOCUMENTATION ONLY (OUTPATIENT)
Dept: ENDOCRINOLOGY | Age: 27
End: 2022-12-02

## 2022-12-02 NOTE — PROGRESS NOTES
Patient was informed of Dr. Lorrie Perea note patient verbalized understanding of the results with no further questions or concerns at this time. Surgery clearance formed faxed to [facility].

## 2022-12-02 NOTE — PROGRESS NOTES
Request came for clearing  her for  surgery from [facility]     A1c is at 8 %  in sept 2022   She is on insulin pump     Patient is to call us if she has any trouble adjusting her insulin pump prior the surgery day and on the day of surgery     Will Garces MD

## 2022-12-29 ENCOUNTER — TELEPHONE (OUTPATIENT)
Dept: ENDOCRINOLOGY | Age: 27
End: 2022-12-29

## 2022-12-29 NOTE — TELEPHONE ENCOUNTER
Patient is on tandem pump     When she is NPO patient to use exercise mode (to go to activity and change to exercise) and when she resumes eating she can take bolus for the food    notify patient

## 2022-12-29 NOTE — PERIOP NOTES
Hibiclens/Chlorhexidine    Preventing Infections Before and After - Your Surgery    IMPORTANT INSTRUCTIONS    Please read and follow these instructions carefully. If you are unable to comply with the below instructions your procedure will be cancelled. Every Night for Three (3) nights before your surgery:  Shower with an antibacterial soap, such as Dial, or the soap provided at your preassessment appointment. A shower is better than a bath for cleaning your skin. If needed, ask someone to help you reach all areas of your body. Dont forget to clean your belly button with every shower. The night before your surgery: If you lose your Hibiclens/chlorhexidine please contact surgery center or you can purchase it at a local pharmacy  On the night before your surgery, shower with an antibacterial soap, such as Dial, or the soap provided at your preassessment appointment. With one packet of Hibiclens/Chlorhexidine in hand, turn water off. Apply Hibiclens antiseptic skin cleanser with a clean, freshly washed washcloth. Gently apply to your body from chin to toes (except the genital area) and especially the area(s) where your incision(s) will be. Leave Hibiclens/Chlorhexidine on your skin for at least 20 seconds. CAUTION: If needed, Hibiclens/chlorhexidine may be used to clean the folds of skin of the legs (such as in the area of the groin) and on your buttocks and hips. However, do not use Hibiclens/Chlorhexidine above the neck or in the genital area (your bottom) or put inside any area of your body. Turn the water back on and rinse. Dry gently with a clean, freshly washed towel. After your shower, do not use any powder, deodorant, perfumes or lotion. Use clean, freshly washed towels and washcloths every time you shower. Wear clean, freshly washed pajamas to bed the night before surgery. Sleep on clean, freshly washed sheets. Do not allow pets to sleep in your bed with you.         The Morning of your surgery:  Shower again thoroughly with an antibacterial soap, such as Dial or the soap provided at your preassessment appointment. If needed, ask someone for help to reach all areas of your body. Dont forget to clean your belly button! Rinse. Dry gently with a clean, freshly washed towel. After your shower, do not use any powder, deodorant, perfumes or lotion prior to surgery. Put on clean, freshly washed clothing. Tips to help prevent infections after your surgery:  Protect your surgical wound from germs:  Hand washing is the most important thing you and your caregivers can do to prevent infections. Keep your bandage clean and dry! Do not touch your surgical wound. Use clean, freshly washed towels and washcloths every time you shower; do not share bath linens with others. Until your surgical wound is healed, wear clothing and sleep on bed linens each day that are clean and freshly washed. Do not allow pets to sleep in your bed with you or touch your surgical wound. Do not smoke - smoking delays wound healing. This may be a good time to stop smoking. If you have diabetes, it is important for you to manage your blood sugar levels properly before your surgery as well as after your surgery. Poorly managed blood sugar levels slow down wound healing and prevent you from healing completely. If you lose your Hibiclens/chlorhexidine, please call the [facility], or it is available for purchase at your pharmacy.                ___________________      ___________________      ________________  (Signature of Patient)          (Witness)                   (Date and Time)

## 2022-12-29 NOTE — TELEPHONE ENCOUNTER
Patient having surg in Jan. 2023 the 3rd or 4th week. They need a letter stating what instructions to follow day of and day after surg. Add to charting so they may review and pull out of system.  Thank you

## 2022-12-29 NOTE — TELEPHONE ENCOUNTER
Informed pt of Dr. Fregoso [place] note. Pt verbalized understanding. Asked her if we needed to fax this anywhere and she stated no. We already faxed the clearance on 12/2/22. No further questions or concerns at this time.

## 2022-12-29 NOTE — PERIOP NOTES
Patient said she is 3 hours away from [facility] to come for PAT testing prior to surgery and was asking if it can be done on DOS or another lab center near  their place  Told patient that I will call Dr. Xiomara Chen office  and I will call her back to inform them of above. Message sent to [facility] at Dr. Xiomara Chen office. Patient stated that her Endocrine doctor had advised her to leave Insulin pump as is on DOS. Instructed patient not to take marijuana on Day of Surgery. Spoke to Juliano re: need for pre-op Instructions for Insulin pump from doctor. As per Juliano Hernandes is on vacation until Jan 10th, but will leave a note need for Insulin pump instructions pre-op.

## 2022-12-29 NOTE — PERIOP NOTES
[place] [facility]  Ambulatory Surgery Unit  Pre-operative Instructions    Surgery/Procedure Date  1/20/2022            Tentative Arrival Time TBD      1. On the day of your surgery/procedure, please report to the Ambulatory Surgery Unit Registration Desk and sign in at your designated time. The Ambulatory Surgery Unit is located in [facility] on the [facility] side of the [facility] across from the [address] [place] [address]. Please have all of your health insurance cards, co-payment, and a photo ID.    **TWO adults may accompany you the day of the procedure. We have limited seating available. If our waiting room is at capacity, your ride may be asked to remain in their vehicle. No one under 15 is allowed in the waiting room. 2. You must have someone with you to drive you home, as you should not drive a car for 24 hours following anesthesia. Please make arrangements for a responsible adult friend or family member to stay with you for at least the first 24 hours after your surgery. 3. Do not have anything to eat or drink (including water, gum, mints, coffee, juice) after 11:59 PM  1/19/2022. This may not apply to medications prescribed by your physician. (Please note below the special instructions with medications to take the morning of surgery, if applicable.)    4. We recommend you do not drink any alcoholic beverages for 24 hours before and after your surgery. 5. Contact your surgeons office for instructions on the following medications: non-steroidal anti-inflammatory drugs (i.e. Advil, Aleve), vitamins, and supplements. (Some surgeons will want you to stop these medications prior to surgery and others may allow you to take them)   **If you are currently taking Plavix, Coumadin, Aspirin and/or other blood-thinning agents, contact your surgeon for instructions. ** Your surgeon will partner with the physician prescribing these medications to determine if it is safe to stop or if you need to continue taking. Please do not stop taking these medications without instructions from your surgeon. 6. See Hibiclens Bathing Instructions. 7. Wear comfortable clothes. Wear glasses instead of contacts. Do not bring any jewelry or money (other than copays or fees as instructed). Do not wear make-up, particularly mascara, the morning of your surgery. Do not wear nail polish, particularly if you are having foot /hand surgery. Wear your hair loose or down, no ponytails, buns, jesus pins or clips. All body piercings must be removed. 8. You should understand that if you do not follow these instructions your surgery may be cancelled. If your physical condition changes (i.e. fever, cold or flu) please contact your surgeon as soon as possible. 9. It is important that you be on time. If a situation occurs where you may be late, or if you have any questions or problems, please call [phone].    10. Your surgery time may be subject to change. You will receive a phone call the day prior to surgery to confirm your arrival time. 11. Pediatric patients: please bring a change of clothes, diapers, bottle/sippy cup, pacifier, etc.      Special Instructions: Take all medications and inhalers, as prescribed, on the morning of surgery with a sip of water. Except: Marijuna      Insulin Pump per Endocrinologist's instructions    Insulin Dependent Diabetic patients: Take your diabetic medications as prescribed the day before surgery. Hold all diabetic medications the day of surgery. If you are scheduled to arrive for surgery after 8:00 AM, and your AM blood sugar is >200, please call Ambulatory Surgery. I understand a pre-operative phone call will be made to verify my surgery time. In the event that I am not available, I give permission for a message to be left on my answering service and/or with another person?       Yes           ___________________      ___________________ ________________  (Signature of Patient)          (Witness)                   (Date and Time)

## 2022-12-30 NOTE — PERIOP NOTES
Hibiclens/Chlorhexidine    Preventing Infections Before and After - Your Surgery    IMPORTANT INSTRUCTIONS    Please read and follow these instructions carefully. If you are unable to comply with the below instructions your procedure will be cancelled. Every Night for Three (3) nights before your surgery:  Shower with an antibacterial soap,  or the soap provided at your preassessment appointment. A shower is better than a bath for cleaning your skin. If needed, ask someone to help you reach all areas of your body. Dont forget to clean your belly button with every shower. The night before your surgery: If you lose your Hibiclens/chlorhexidine please contact surgery center or you can purchase it at a local pharmacy  On the night before your surgery, shower with an antibacterial soap, or the soap provided at your preassessment appointment. With one packet of Hibiclens/Chlorhexidine in hand, turn water off. Apply Hibiclens antiseptic skin cleanser with a clean, freshly washed washcloth. Gently apply to your body from chin to toes (except the genital area) and especially the area(s) where your incision(s) will be. Leave Hibiclens/Chlorhexidine on your skin for at least 20 seconds. CAUTION: If needed, Hibiclens/chlorhexidine may be used to clean the folds of skin of the legs (such as in the area of the groin) and on your buttocks and hips. However, do not use Hibiclens/Chlorhexidine above the neck or in the genital area (your bottom) or put inside any area of your body. Turn the water back on and rinse. Dry gently with a clean, freshly washed towel. After your shower, do not use any powder, deodorant, perfumes or lotion. Use clean, freshly washed towels and washcloths every time you shower. Wear clean, freshly washed pajamas to bed the night before surgery. Sleep on clean, freshly washed sheets. Do not allow pets to sleep in your bed with you.         The Morning of your surgery:  Shower again thoroughly with an antibacterial soap, or the soap provided at your preassessment appointment. If needed, ask someone for help to reach all areas of your body. Dont forget to clean your belly button! Rinse. Dry gently with a clean, freshly washed towel. After your shower, do not use any powder, deodorant, perfumes or lotion prior to surgery. Put on clean, freshly washed clothing. Tips to help prevent infections after your surgery:  Protect your surgical wound from germs:  Hand washing is the most important thing you and your caregivers can do to prevent infections. Keep your bandage clean and dry! Do not touch your surgical wound. Use clean, freshly washed towels and washcloths every time you shower; do not share bath linens with others. Until your surgical wound is healed, wear clothing and sleep on bed linens each day that are clean and freshly washed. Do not allow pets to sleep in your bed with you or touch your surgical wound. Do not smoke - smoking delays wound healing. This may be a good time to stop smoking. If you have diabetes, it is important for you to manage your blood sugar levels properly before your surgery as well as after your surgery. Poorly managed blood sugar levels slow down wound healing and prevent you from healing completely. If you lose your Hibiclens/chlorhexidine, please call the [facility], or it is available for purchase at your pharmacy.                ___________________      ___________________      ________________  (Signature of Patient)          (Witness)                   (Date and Time)

## 2022-12-30 NOTE — PERIOP NOTES
Patient called back, PAT scheduled on 1/13/2023 at 12pm. Given address and tel# to patient. Offered to give directions but patient stated she will just google it. Patient stated she does not use Dial because she is allergic to it.

## 2022-12-30 NOTE — PERIOP NOTES
[facility]  Ambulatory Surgery Unit  Pre-operative Instructions    Surgery/Procedure Date  1/20/2023            Tentative Arrival Time TBD      1. On the day of your surgery/procedure, please report to the Ambulatory Surgery Unit Registration Desk and sign in at your designated time. The Ambulatory Surgery Unit is located in [facility] on the [facility] side of the hospitals across from the [address] [place] [address]. Please have all of your health insurance cards, co-payment, and a photo ID.    **TWO adults may accompany you the day of the procedure. We have limited seating available. If our waiting room is at capacity, your ride may be asked to remain in their vehicle. No one under 15 is allowed in the waiting room. 2. You must have someone with you to drive you home, as you should not drive a car for 24 hours following anesthesia. Please make arrangements for a responsible adult friend or family member to stay with you for at least the first 24 hours after your surgery. 3. Do not have anything to eat or drink (including water, gum, mints, coffee, juice) after 11:59 PM  1/22/2023. This may not apply to medications prescribed by your physician. (Please note below the special instructions with medications to take the morning of surgery, if applicable.)    4. We recommend you do not drink any alcoholic beverages for 24 hours before and after your surgery. 5. Contact your surgeons office for instructions on the following medications: non-steroidal anti-inflammatory drugs (i.e. Advil, Aleve), vitamins, and supplements. (Some surgeons will want you to stop these medications prior to surgery and others may allow you to take them)   **If you are currently taking Plavix, Coumadin, Aspirin and/or other blood-thinning agents, contact your surgeon for instructions. ** Your surgeon will partner with the physician prescribing these medications to determine if it is safe to stop or if you need to continue taking. Please do not stop taking these medications without instructions from your surgeon. 6. See Hibiclens Bathing Instructions ( Patient Allergic to Dial Soap). 7. Wear comfortable clothes. Wear glasses instead of contacts. Do not bring any jewelry or money (other than copays or fees as instructed). Do not wear make-up, particularly mascara, the morning of your surgery. Do not wear nail polish, particularly if you are having foot /hand surgery. Wear your hair loose or down, no ponytails, buns, jesus pins or clips. All body piercings must be removed. 8. You should understand that if you do not follow these instructions your surgery may be cancelled. If your physical condition changes (i.e. fever, cold or flu) please contact your surgeon as soon as possible. 9. It is important that you be on time. If a situation occurs where you may be late, or if you have any questions or problems, please call [phone].    10. Your surgery time may be subject to change. You will receive a phone call the day prior to surgery to confirm your arrival time. 11. Pediatric patients: please bring a change of clothes, diapers, bottle/sippy cup, pacifier, etc.      Special Instructions: Take all medications and inhalers, as prescribed, on the morning of surgery with a sip of water EXCEPT:   Patient on Insulin Pump, to follow Endocrinologist's instructions. Insulin Dependent Diabetic patients: Take your diabetic medications as prescribed the day before surgery. Hold all diabetic medications the day of surgery. If you are scheduled to arrive for surgery after 8:00 AM, and your AM blood sugar is >200, please call Ambulatory Surgery. I understand a pre-operative phone call will be made to verify my surgery time. In the event that I am not available, I give permission for a message to be left on my answering service and/or with another person?       yes         ___________________ ___________________      ________________  (Signature of Patient)          (Witness)                   (Date and Time)

## 2023-01-05 ENCOUNTER — VIRTUAL VISIT (OUTPATIENT)
Dept: SLEEP MEDICINE | Age: 28
End: 2023-01-05
Payer: MEDICARE

## 2023-01-05 DIAGNOSIS — G47.33 OSA (OBSTRUCTIVE SLEEP APNEA): Primary | ICD-10-CM

## 2023-01-05 PROCEDURE — G9717 DOC PT DX DEP/BP F/U NT REQ: HCPCS | Performed by: SPECIALIST

## 2023-01-05 PROCEDURE — 99204 OFFICE O/P NEW MOD 45 MIN: CPT | Performed by: SPECIALIST

## 2023-01-05 PROCEDURE — G8427 DOCREV CUR MEDS BY ELIG CLIN: HCPCS | Performed by: SPECIALIST

## 2023-01-05 NOTE — PROGRESS NOTES
[address] [facility] [address] [place], [address]  Tel.  [phone]  Fax. [address] [facility] 60  [place], [address] [facility]  Tel.  [phone]  Fax. [phone] [address] [facility] Michael Ramon   Tel.  [phone]  Fax. [address] [facility], who was evaluated through a synchronous (real-time) audio-video encounter, and/or her healthcare decision maker, is aware that it is a billable service, which includes applicable co-pays, with coverage as determined by her insurance carrier. She provided verbal consent to proceed and patient identification was verified. This visit was conducted pursuant to the emergency declaration under the [facility], [address] [place] [address] authority and the [employer] ([place]) and Diligent [employer] General Act. A caregiver was present when appropriate. Ability to conduct physical exam was limited. The patient was located at: Home: [place] [address]  The provider was located at: Facility (Appt Department): [address] [place] [phone]    --Francicso Soler MD on 1/5/2023 at 5:08 PM             Chief Complaint       Chief Complaint   Patient presents with    Sleep Problem     NP; assess for SHOSHANA, snoring and witnessed apnea, family history of SHOSHANA       HPI      Melo Nix is 32 y.o. female seen for evaluation of a sleep disorder. The patient reports she has experienced snoring and associated apnea. Normally retires between 5: 45-10 PM and will get a bed at 8 AM.  May awaken several times during the night. Has been told of snoring associated with apparent apnea. Notes sleepiness persisting on awakening. Denies vivid dreaming ; nightmares, sleep talking or sleepwalking, bruxism or nocturnal incontinence, abnormal arm or leg movements, hypnagogue hallucinations, sleep paralysis or cataplexy.     The patient has not undergone diagnostic testing for the current problems. [place] Sleepiness Score: (P) 10       Allergies   Allergen Reactions    Latex Rash    Other Food Itching     Califlower    Banana Itching and Nausea and Vomiting    Benadryl [Diphenhydramine Hcl] Rash    Children's Tylenol [Acetaminophen] Rash    Honey Itching     Itchy tounge    Kiwi Itching and Nausea and Vomiting    Lactose Nausea and Vomiting     Only milk is the issue    Other Plant, Animal, Environmental Other (comments)     Dial Soap  - irritates skin    Peach (Prunus Persica) Itching and Nausea and Vomiting     Allergic reactions to raw peaches    Pineapple Itching and Nausea and Vomiting    Procaine Other (comments)    Strawberry Itching and Nausea and Vomiting     Allergies to raw strawberries       Current Outpatient Medications   Medication Sig Dispense Refill    sertraline (ZOLOFT) 50 mg tablet TAKE 1 TABLET BY ORAL ROUTE 1 TIME PER DAY WITH 200MG DOSE FOR TOTAL DOSE 250MG DAILY. norethindrone acetate (AYGESTIN) 5 mg tablet Take 5 mg by mouth daily. melatonin 5 mg tablet melatonin 5 mg tablet   TAKE 1-2 TABLETS BY MOUTH AT BEDTIME AS NEEDED      Glucagon Emergency Kit, human, 1 mg solr 1 MG BY INTRAMUSCULAR ROUTE AS NEEDED (HYPOGLYCEMIA). 1 Each 4    insulin aspart U-100 (NovoLOG U-100 Insulin aspart) 100 unit/mL injection USE WITH INSULIN PUMP MAX UNITS DAILY: 100 90 mL 2    glucagon 1 mg solr 1 mg by IntraMUSCular route as needed (HYPOGLYCEMIA). 1 Each 1    FLAXSEED PO Take  by mouth daily. glucosamine HCl/chondroitin heath (GLUCOSAMINE-CHONDROITIN PO) Take  by mouth daily. glucagon (GlucaGen HypoKit) 1 mg injection INJECT 1 ML INTRAMUSCULARLY ONCE FOR 1 DOSE. 2 mL 1    busPIRone (BUSPAR) 10 mg tablet Take 10 mg by mouth three (3) times daily.       glucose blood VI test strips (Contour Next Test Strips) strip TEST BLOOD GLUCOSE 6 TIMES DAILY DX CODE: E10.65 (Patient not taking: No sig reported) 150 Strip 11    norethindrone (MICRONOR) 0.35 mg tab Take 1 Tab by mouth daily. (Patient not taking: No sig reported)      sertraline (ZOLOFT) 100 mg tablet Take 1 Tab by mouth daily. Indications: DEPRESSION (Patient taking differently: Take 250 mg by mouth daily. Indications: depression) 15 Tab 1        She  has a past medical history of Arthritis, Asperger's syndrome, Asthma, Bipolar 1 disorder ([address] Chronic pain, Depression, Diabetes ([address] E-coli UTI, Endocrine disease, FHx: allergies, GERD (gastroesophageal reflux disease), blood clots (2020), Manic depression ([address] Seizure ([address] (2020), Thyroid disease, and Vision decreased. She  has a past surgical history that includes hx ankle fracture tx (Right, 08/08/2018) and hx colonoscopy (11/2022). She family history includes Asthma in her brother and mother; Diabetes in her mother; Hypertension in her maternal grandfather and maternal grandmother; Other in her brother. She  reports that she has been smoking cigarettes. She has never used smokeless tobacco. She reports current alcohol use of about 1.0 standard drink per week. She reports current drug use. Frequency: 7.00 times per week. Drug: Marijuana. Review of Systems:  ROS      Objective:   Visit Vitals  LMP  (LMP Unknown) Comment: patient taking Norethindrone     There is no height or weight on file to calculate BMI. General:   Conversant, cooperative   Eyes:  no nystagmus   Oropharynx:   Mallampati score II, tongue scalloped                   Skin:  no obvious rashes   Neuro:  Speech fluent, face symmetrical, tongue movement normal   Psych:  Normal affect,  normal countenance        Assessment:       ICD-10-CM ICD-9-CM    1. SHOSHANA (obstructive sleep apnea)  G47.33 327.23 SLEEP STUDY UNATTENDED, 4 CHANNEL          Potential sleep disordered breathing.   Patient will be evaluated with a home sleep test.      Plan:     Orders Placed This Encounter    SLEEP STUDY UNATTENDED, 4 CHANNEL     Order Specific Question:   Reason for Exam     Answer:   snoring       * Patient has a history and examination consistent with the diagnosis of sleep apnea. *Home sleep testing was ordered for initial evaluation. * She was provided information on sleep apnea including corresponding risk factors and the importance of proper treatment. * Treatment options if indicated were reviewed today. Instructions:  Do not engage in activities requiring a normal degree of alertness if fatigue is present. The patient understands that untreated or undertreated sleep apnea could impair judgement and the ability to function normally during the day. Call or return if symptoms worsen or persist.          Marvin Mas MD, FAASM  Electronically signed 01/05/23     Greater than 20 minutes spent: In direct video patient care, chart review and planning. This note was created using voice recognition software. Despite editing, there may be syntax errors. This note will not be viewable in [address]

## 2023-01-13 ENCOUNTER — HOSPITAL ENCOUNTER (OUTPATIENT)
Dept: SURGERY | Age: 28
Discharge: HOME | End: 2023-01-13
Payer: MEDICARE

## 2023-01-13 VITALS
TEMPERATURE: 98.6 F | DIASTOLIC BLOOD PRESSURE: 78 MMHG | SYSTOLIC BLOOD PRESSURE: 128 MMHG | OXYGEN SATURATION: 98 % | RESPIRATION RATE: 16 BRPM | HEART RATE: 73 BPM

## 2023-01-13 LAB
ABO + RH BLD: NORMAL
APPEARANCE UR: CLEAR
BACTERIA URNS QL MICRO: ABNORMAL /HPF
BILIRUB UR QL: NEGATIVE
BLOOD GROUP ANTIBODIES SERPL: NORMAL
COLOR UR: ABNORMAL
EPITH CASTS URNS QL MICRO: ABNORMAL /LPF
ERYTHROCYTE [DISTWIDTH] IN BLOOD BY AUTOMATED COUNT: 12.5 % (ref 11.5–14.5)
GLUCOSE UR STRIP.AUTO-MCNC: >1000 MG/DL
HCT VFR BLD AUTO: 43.6 % (ref 35–47)
HGB BLD-MCNC: 14.5 G/DL (ref 11.5–16)
HGB UR QL STRIP: NEGATIVE
HYALINE CASTS URNS QL MICRO: ABNORMAL /LPF (ref 0–2)
KETONES UR QL STRIP.AUTO: ABNORMAL MG/DL
LEUKOCYTE ESTERASE UR QL STRIP.AUTO: ABNORMAL
MCH RBC QN AUTO: 30 PG (ref 26–34)
MCHC RBC AUTO-ENTMCNC: 33.3 G/DL (ref 30–36.5)
MCV RBC AUTO: 90.1 FL (ref 80–99)
NITRITE UR QL STRIP.AUTO: NEGATIVE
NRBC # BLD: 0 K/UL (ref 0–0.01)
NRBC BLD-RTO: 0 PER 100 WBC
PH UR STRIP: 7.5 (ref 5–8)
PLATELET # BLD AUTO: 345 K/UL (ref 150–400)
PMV BLD AUTO: 9.2 FL (ref 8.9–12.9)
PROT UR STRIP-MCNC: NEGATIVE MG/DL
RBC # BLD AUTO: 4.84 M/UL (ref 3.8–5.2)
RBC #/AREA URNS HPF: ABNORMAL /HPF (ref 0–5)
SP GR UR REFRACTOMETRY: 1.03
SPECIMEN EXP DATE BLD: NORMAL
UA: UC IF INDICATED,UAUC: ABNORMAL
UROBILINOGEN UR QL STRIP.AUTO: 1 EU/DL (ref 0.2–1)
WBC # BLD AUTO: 7 K/UL (ref 3.6–11)
WBC URNS QL MICRO: ABNORMAL /HPF (ref 0–4)

## 2023-01-13 PROCEDURE — 36415 COLL VENOUS BLD VENIPUNCTURE: CPT

## 2023-01-13 PROCEDURE — 86900 BLOOD TYPING SEROLOGIC ABO: CPT

## 2023-01-13 PROCEDURE — 81001 URINALYSIS AUTO W/SCOPE: CPT

## 2023-01-13 PROCEDURE — 85027 COMPLETE CBC AUTOMATED: CPT

## 2023-01-13 NOTE — PERIOP NOTES
Patient came in today for PAT testing, stated that she was given instructions by her endocrinologist  re: Insulin Pump pre-op.

## 2023-01-16 NOTE — PERIOP NOTES
Spoke to PIPO Shah, NP Anesthesia, referred UA result done on 1/13/2023. Glucose greater than 1000, trace leukoesterase UA 3 plus,epithelial cells. but no culture indicated . As per PIPO Shah to fax results to PCP  and Dr. Uriel Ludwig. 8:43 Spoke to [place] at Dr. Phoenix Thao office and informed her that UA result is being faxed to their office to have Dr. Karina Low look at it. fax# [phone]. )850: UA result also faxed to Dr. Lilly Killian' office.

## 2023-01-17 ENCOUNTER — ANESTHESIA EVENT (OUTPATIENT)
Dept: SURGERY | Age: 28
End: 2023-01-17
Payer: MEDICARE

## 2023-01-20 ENCOUNTER — ANESTHESIA (OUTPATIENT)
Dept: SURGERY | Age: 28
End: 2023-01-20
Payer: MEDICARE

## 2023-01-20 ENCOUNTER — HOSPITAL ENCOUNTER (OUTPATIENT)
Age: 28
Setting detail: OUTPATIENT SURGERY
Discharge: HOME | End: 2023-01-20
Attending: OBSTETRICS & GYNECOLOGY | Admitting: OBSTETRICS & GYNECOLOGY
Payer: MEDICARE

## 2023-01-20 VITALS
TEMPERATURE: 97.8 F | OXYGEN SATURATION: 98 % | RESPIRATION RATE: 17 BRPM | BODY MASS INDEX: 40.93 KG/M2 | DIASTOLIC BLOOD PRESSURE: 62 MMHG | SYSTOLIC BLOOD PRESSURE: 106 MMHG | HEIGHT: 63 IN | WEIGHT: 231 LBS | HEART RATE: 91 BPM

## 2023-01-20 DIAGNOSIS — E28.2 PCOS (POLYCYSTIC OVARIAN SYNDROME): Primary | ICD-10-CM

## 2023-01-20 LAB
GLUCOSE BLD STRIP.AUTO-MCNC: 111 MG/DL (ref 65–117)
HCG UR QL: NEGATIVE
SERVICE CMNT-IMP: NORMAL

## 2023-01-20 PROCEDURE — 77030002933 HC SUT MCRYL J&J -A: Performed by: OBSTETRICS & GYNECOLOGY

## 2023-01-20 PROCEDURE — 74011250636 HC RX REV CODE- 250/636: Performed by: OBSTETRICS & GYNECOLOGY

## 2023-01-20 PROCEDURE — 82962 GLUCOSE BLOOD TEST: CPT

## 2023-01-20 PROCEDURE — 77030016151 HC PROTCTR LNS DFOG COVD -B: Performed by: OBSTETRICS & GYNECOLOGY

## 2023-01-20 PROCEDURE — 74011250636 HC RX REV CODE- 250/636

## 2023-01-20 PROCEDURE — 77030013079 HC BLNKT BAIR HGGR 3M -A: Performed by: NURSE ANESTHETIST, CERTIFIED REGISTERED

## 2023-01-20 PROCEDURE — 74011250636 HC RX REV CODE- 250/636: Performed by: ANESTHESIOLOGY

## 2023-01-20 PROCEDURE — 76030000003 HC AMB SURG OR TIME 1.5 TO 2: Performed by: OBSTETRICS & GYNECOLOGY

## 2023-01-20 PROCEDURE — 77030038613 HC SUT PDS STRATA SPIRL J&J -B: Performed by: OBSTETRICS & GYNECOLOGY

## 2023-01-20 PROCEDURE — 76060000063 HC AMB SURG ANES 1.5 TO 2 HR: Performed by: OBSTETRICS & GYNECOLOGY

## 2023-01-20 PROCEDURE — 77030010507 HC ADH SKN DERMBND J&J -B: Performed by: OBSTETRICS & GYNECOLOGY

## 2023-01-20 PROCEDURE — 74011250636 HC RX REV CODE- 250/636: Performed by: NURSE ANESTHETIST, CERTIFIED REGISTERED

## 2023-01-20 PROCEDURE — 77030008771 HC TU NG SALEM SUMP -A: Performed by: NURSE ANESTHETIST, CERTIFIED REGISTERED

## 2023-01-20 PROCEDURE — 77030019908 HC STETH ESOPH SIMS -A: Performed by: NURSE ANESTHETIST, CERTIFIED REGISTERED

## 2023-01-20 PROCEDURE — 77030037241 HC PRT ACC BLDLSS AIRSEAL CNMD -B: Performed by: OBSTETRICS & GYNECOLOGY

## 2023-01-20 PROCEDURE — 77030026438 HC STYL ET INTUB CARD -A: Performed by: NURSE ANESTHETIST, CERTIFIED REGISTERED

## 2023-01-20 PROCEDURE — 74011000250 HC RX REV CODE- 250: Performed by: OBSTETRICS & GYNECOLOGY

## 2023-01-20 PROCEDURE — 76210000050 HC AMBSU PH II REC 0.5 TO 1 HR: Performed by: OBSTETRICS & GYNECOLOGY

## 2023-01-20 PROCEDURE — 81025 URINE PREGNANCY TEST: CPT

## 2023-01-20 PROCEDURE — 77030025625 HC DEV TISS SEAL J&J -F: Performed by: OBSTETRICS & GYNECOLOGY

## 2023-01-20 PROCEDURE — 77030008684 HC TU ET CUF COVD -B: Performed by: NURSE ANESTHETIST, CERTIFIED REGISTERED

## 2023-01-20 PROCEDURE — 77030020061 HC IV BLD WRMR ADMIN SET 3M -B: Performed by: NURSE ANESTHETIST, CERTIFIED REGISTERED

## 2023-01-20 PROCEDURE — 77030008606 HC TRCR ENDOSC KII AMR -B: Performed by: OBSTETRICS & GYNECOLOGY

## 2023-01-20 PROCEDURE — 77030021678 HC GLIDESCP STAT DISP VERT -B: Performed by: NURSE ANESTHETIST, CERTIFIED REGISTERED

## 2023-01-20 PROCEDURE — 76210000036 HC AMBSU PH I REC 1.5 TO 2 HR: Performed by: OBSTETRICS & GYNECOLOGY

## 2023-01-20 PROCEDURE — 74011000250 HC RX REV CODE- 250: Performed by: NURSE ANESTHETIST, CERTIFIED REGISTERED

## 2023-01-20 PROCEDURE — 77030018684: Performed by: OBSTETRICS & GYNECOLOGY

## 2023-01-20 PROCEDURE — 77030010032 HC SCLPL DISECT HARM J&J -C: Performed by: OBSTETRICS & GYNECOLOGY

## 2023-01-20 PROCEDURE — 88307 TISSUE EXAM BY PATHOLOGIST: CPT

## 2023-01-20 PROCEDURE — 77030020702 HC ADAPT HARM DISP J&J -B: Performed by: OBSTETRICS & GYNECOLOGY

## 2023-01-20 PROCEDURE — 2709999900 HC NON-CHARGEABLE SUPPLY: Performed by: OBSTETRICS & GYNECOLOGY

## 2023-01-20 PROCEDURE — 74011250637 HC RX REV CODE- 250/637

## 2023-01-20 PROCEDURE — 77030018778 HC MANIP UTER VCAR CNMD -B: Performed by: OBSTETRICS & GYNECOLOGY

## 2023-01-20 RX ORDER — ROCURONIUM BROMIDE 10 MG/ML
INJECTION, SOLUTION INTRAVENOUS AS NEEDED
Status: DISCONTINUED | OUTPATIENT
Start: 2023-01-20 | End: 2023-01-20 | Stop reason: HOSPADM

## 2023-01-20 RX ORDER — GLYCOPYRROLATE 0.2 MG/ML
INJECTION INTRAMUSCULAR; INTRAVENOUS AS NEEDED
Status: DISCONTINUED | OUTPATIENT
Start: 2023-01-20 | End: 2023-01-20 | Stop reason: HOSPADM

## 2023-01-20 RX ORDER — SODIUM CHLORIDE, SODIUM LACTATE, POTASSIUM CHLORIDE, CALCIUM CHLORIDE 600; 310; 30; 20 MG/100ML; MG/100ML; MG/100ML; MG/100ML
25 INJECTION, SOLUTION INTRAVENOUS CONTINUOUS
Status: DISCONTINUED | OUTPATIENT
Start: 2023-01-20 | End: 2023-01-20 | Stop reason: HOSPADM

## 2023-01-20 RX ORDER — HYDROMORPHONE HYDROCHLORIDE 1 MG/ML
.2-.5 INJECTION, SOLUTION INTRAMUSCULAR; INTRAVENOUS; SUBCUTANEOUS ONCE
Status: DISCONTINUED | OUTPATIENT
Start: 2023-01-20 | End: 2023-01-20 | Stop reason: HOSPADM

## 2023-01-20 RX ORDER — ENOXAPARIN SODIUM 100 MG/ML
40 INJECTION SUBCUTANEOUS ONCE
Status: COMPLETED | OUTPATIENT
Start: 2023-01-20 | End: 2023-01-20

## 2023-01-20 RX ORDER — MIDAZOLAM HYDROCHLORIDE 1 MG/ML
INJECTION, SOLUTION INTRAMUSCULAR; INTRAVENOUS AS NEEDED
Status: DISCONTINUED | OUTPATIENT
Start: 2023-01-20 | End: 2023-01-20 | Stop reason: HOSPADM

## 2023-01-20 RX ORDER — BUPIVACAINE HYDROCHLORIDE AND EPINEPHRINE 5; 5 MG/ML; UG/ML
INJECTION, SOLUTION PERINEURAL AS NEEDED
Status: DISCONTINUED | OUTPATIENT
Start: 2023-01-20 | End: 2023-01-20 | Stop reason: HOSPADM

## 2023-01-20 RX ORDER — FENTANYL CITRATE 50 UG/ML
25 INJECTION, SOLUTION INTRAMUSCULAR; INTRAVENOUS
Status: DISCONTINUED | OUTPATIENT
Start: 2023-01-20 | End: 2023-01-20 | Stop reason: HOSPADM

## 2023-01-20 RX ORDER — OXYCODONE HYDROCHLORIDE 5 MG/1
5 TABLET ORAL
Qty: 20 TABLET | Refills: 0 | Status: SHIPPED | OUTPATIENT
Start: 2023-01-20 | End: 2023-01-23

## 2023-01-20 RX ORDER — DROPERIDOL 2.5 MG/ML
0.62 INJECTION, SOLUTION INTRAMUSCULAR; INTRAVENOUS AS NEEDED
Status: DISCONTINUED | OUTPATIENT
Start: 2023-01-20 | End: 2023-01-20 | Stop reason: HOSPADM

## 2023-01-20 RX ORDER — OXYCODONE HYDROCHLORIDE 5 MG/1
5 TABLET ORAL
Status: COMPLETED | OUTPATIENT
Start: 2023-01-20 | End: 2023-01-20

## 2023-01-20 RX ORDER — FAMOTIDINE 10 MG/ML
INJECTION INTRAVENOUS
Status: DISCONTINUED
Start: 2023-01-20 | End: 2023-01-20 | Stop reason: HOSPADM

## 2023-01-20 RX ORDER — HYDROMORPHONE HYDROCHLORIDE 2 MG/ML
INJECTION, SOLUTION INTRAMUSCULAR; INTRAVENOUS; SUBCUTANEOUS AS NEEDED
Status: DISCONTINUED | OUTPATIENT
Start: 2023-01-20 | End: 2023-01-20 | Stop reason: HOSPADM

## 2023-01-20 RX ORDER — SODIUM CHLORIDE 0.9 % (FLUSH) 0.9 %
5-40 SYRINGE (ML) INJECTION EVERY 8 HOURS
Status: DISCONTINUED | OUTPATIENT
Start: 2023-01-20 | End: 2023-01-20 | Stop reason: HOSPADM

## 2023-01-20 RX ORDER — ONDANSETRON 4 MG/1
4 TABLET, ORALLY DISINTEGRATING ORAL
Qty: 20 TABLET | Refills: 1 | Status: SHIPPED | OUTPATIENT
Start: 2023-01-20

## 2023-01-20 RX ORDER — SODIUM CHLORIDE 0.9 % (FLUSH) 0.9 %
5-40 SYRINGE (ML) INJECTION AS NEEDED
Status: DISCONTINUED | OUTPATIENT
Start: 2023-01-20 | End: 2023-01-20 | Stop reason: HOSPADM

## 2023-01-20 RX ORDER — IBUPROFEN 800 MG/1
TABLET ORAL
Qty: 30 TABLET | Refills: 1 | Status: SHIPPED | OUTPATIENT
Start: 2023-01-20

## 2023-01-20 RX ORDER — NEOSTIGMINE METHYLSULFATE 1 MG/ML
INJECTION, SOLUTION INTRAVENOUS AS NEEDED
Status: DISCONTINUED | OUTPATIENT
Start: 2023-01-20 | End: 2023-01-20 | Stop reason: HOSPADM

## 2023-01-20 RX ORDER — KETOROLAC TROMETHAMINE 30 MG/ML
INJECTION, SOLUTION INTRAMUSCULAR; INTRAVENOUS AS NEEDED
Status: DISCONTINUED | OUTPATIENT
Start: 2023-01-20 | End: 2023-01-20 | Stop reason: HOSPADM

## 2023-01-20 RX ORDER — FENTANYL CITRATE 50 UG/ML
INJECTION, SOLUTION INTRAMUSCULAR; INTRAVENOUS
Status: COMPLETED
Start: 2023-01-20 | End: 2023-01-20

## 2023-01-20 RX ORDER — LIDOCAINE HYDROCHLORIDE 20 MG/ML
INJECTION, SOLUTION EPIDURAL; INFILTRATION; INTRACAUDAL; PERINEURAL AS NEEDED
Status: DISCONTINUED | OUTPATIENT
Start: 2023-01-20 | End: 2023-01-20 | Stop reason: HOSPADM

## 2023-01-20 RX ORDER — MORPHINE SULFATE 10 MG/ML
2 INJECTION, SOLUTION INTRAMUSCULAR; INTRAVENOUS
Status: DISCONTINUED | OUTPATIENT
Start: 2023-01-20 | End: 2023-01-20 | Stop reason: HOSPADM

## 2023-01-20 RX ORDER — FAMOTIDINE 10 MG/ML
20 INJECTION INTRAVENOUS ONCE
Status: COMPLETED | OUTPATIENT
Start: 2023-01-20 | End: 2023-01-20

## 2023-01-20 RX ORDER — OXYCODONE HYDROCHLORIDE 5 MG/1
TABLET ORAL
Status: COMPLETED
Start: 2023-01-20 | End: 2023-01-20

## 2023-01-20 RX ORDER — PROPOFOL 10 MG/ML
INJECTION, EMULSION INTRAVENOUS AS NEEDED
Status: DISCONTINUED | OUTPATIENT
Start: 2023-01-20 | End: 2023-01-20 | Stop reason: HOSPADM

## 2023-01-20 RX ORDER — METRONIDAZOLE 500 MG/100ML
500 INJECTION, SOLUTION INTRAVENOUS ONCE
Status: COMPLETED | OUTPATIENT
Start: 2023-01-20 | End: 2023-01-20

## 2023-01-20 RX ORDER — SUCCINYLCHOLINE CHLORIDE 20 MG/ML
INJECTION INTRAMUSCULAR; INTRAVENOUS AS NEEDED
Status: DISCONTINUED | OUTPATIENT
Start: 2023-01-20 | End: 2023-01-20 | Stop reason: HOSPADM

## 2023-01-20 RX ORDER — LIDOCAINE HYDROCHLORIDE 10 MG/ML
0.1 INJECTION, SOLUTION EPIDURAL; INFILTRATION; INTRACAUDAL; PERINEURAL AS NEEDED
Status: DISCONTINUED | OUTPATIENT
Start: 2023-01-20 | End: 2023-01-20 | Stop reason: HOSPADM

## 2023-01-20 RX ORDER — ONDANSETRON 2 MG/ML
INJECTION INTRAMUSCULAR; INTRAVENOUS AS NEEDED
Status: DISCONTINUED | OUTPATIENT
Start: 2023-01-20 | End: 2023-01-20 | Stop reason: HOSPADM

## 2023-01-20 RX ADMIN — SODIUM CHLORIDE 4 MCG: 900 INJECTION, SOLUTION INTRAVENOUS at 12:59

## 2023-01-20 RX ADMIN — FENTANYL CITRATE 25 MCG: 50 INJECTION, SOLUTION INTRAMUSCULAR; INTRAVENOUS at 15:15

## 2023-01-20 RX ADMIN — FAMOTIDINE 20 MG: 10 INJECTION, SOLUTION INTRAVENOUS at 11:00

## 2023-01-20 RX ADMIN — ONDANSETRON HYDROCHLORIDE 4 MG: 2 INJECTION, SOLUTION INTRAMUSCULAR; INTRAVENOUS at 14:20

## 2023-01-20 RX ADMIN — PROPOFOL 250 MG: 10 INJECTION, EMULSION INTRAVENOUS at 12:53

## 2023-01-20 RX ADMIN — ROCURONIUM BROMIDE 10 MG: 10 INJECTION INTRAVENOUS at 13:08

## 2023-01-20 RX ADMIN — SODIUM CHLORIDE, POTASSIUM CHLORIDE, SODIUM LACTATE AND CALCIUM CHLORIDE 25 ML/HR: 600; 310; 30; 20 INJECTION, SOLUTION INTRAVENOUS at 10:40

## 2023-01-20 RX ADMIN — OXYCODONE HYDROCHLORIDE 5 MG: 5 TABLET ORAL at 15:50

## 2023-01-20 RX ADMIN — LIDOCAINE HYDROCHLORIDE 40 MG: 20 INJECTION, SOLUTION EPIDURAL; INFILTRATION; INTRACAUDAL; PERINEURAL at 14:34

## 2023-01-20 RX ADMIN — MIDAZOLAM HYDROCHLORIDE 2 MG: 1 INJECTION, SOLUTION INTRAMUSCULAR; INTRAVENOUS at 12:47

## 2023-01-20 RX ADMIN — METRONIDAZOLE 500 MG: 500 INJECTION, SOLUTION INTRAVENOUS at 13:03

## 2023-01-20 RX ADMIN — GLYCOPYRROLATE 0.4 MG: 0.2 INJECTION, SOLUTION INTRAMUSCULAR; INTRAVENOUS at 14:20

## 2023-01-20 RX ADMIN — Medication 3 MG: at 14:20

## 2023-01-20 RX ADMIN — WATER 2 G: 1 INJECTION INTRAMUSCULAR; INTRAVENOUS; SUBCUTANEOUS at 12:58

## 2023-01-20 RX ADMIN — FENTANYL CITRATE 25 MCG: 50 INJECTION, SOLUTION INTRAMUSCULAR; INTRAVENOUS at 14:58

## 2023-01-20 RX ADMIN — LIDOCAINE HYDROCHLORIDE 40 MG: 20 INJECTION, SOLUTION EPIDURAL; INFILTRATION; INTRACAUDAL; PERINEURAL at 12:53

## 2023-01-20 RX ADMIN — ROCURONIUM BROMIDE 5 MG: 10 INJECTION INTRAVENOUS at 12:53

## 2023-01-20 RX ADMIN — ENOXAPARIN SODIUM 40 MG: 40 INJECTION, SOLUTION INTRAVENOUS; SUBCUTANEOUS at 14:44

## 2023-01-20 RX ADMIN — SUCCINYLCHOLINE CHLORIDE 160 MG: 20 INJECTION, SOLUTION INTRAMUSCULAR; INTRAVENOUS at 12:53

## 2023-01-20 RX ADMIN — KETOROLAC TROMETHAMINE 30 MG: 30 INJECTION, SOLUTION INTRAMUSCULAR; INTRAVENOUS at 14:20

## 2023-01-20 RX ADMIN — OXYCODONE 5 MG: 5 TABLET ORAL at 15:50

## 2023-01-20 RX ADMIN — HYDROMORPHONE HYDROCHLORIDE 0.5 MG: 2 INJECTION, SOLUTION INTRAMUSCULAR; INTRAVENOUS; SUBCUTANEOUS at 14:00

## 2023-01-20 RX ADMIN — SODIUM CHLORIDE 6 MCG: 900 INJECTION, SOLUTION INTRAVENOUS at 13:09

## 2023-01-20 RX ADMIN — ROCURONIUM BROMIDE 25 MG: 10 INJECTION INTRAVENOUS at 13:04

## 2023-01-20 RX ADMIN — HYDROMORPHONE HYDROCHLORIDE 0.5 MG: 2 INJECTION, SOLUTION INTRAMUSCULAR; INTRAVENOUS; SUBCUTANEOUS at 13:11

## 2023-01-20 RX ADMIN — HYDROMORPHONE HYDROCHLORIDE 0.5 MG: 2 INJECTION, SOLUTION INTRAMUSCULAR; INTRAVENOUS; SUBCUTANEOUS at 12:47

## 2023-01-20 RX ADMIN — SODIUM CHLORIDE, POTASSIUM CHLORIDE, SODIUM LACTATE AND CALCIUM CHLORIDE: 600; 310; 30; 20 INJECTION, SOLUTION INTRAVENOUS at 14:24

## 2023-01-20 NOTE — ANESTHESIA PREPROCEDURE EVALUATION
Relevant Problems   RESPIRATORY SYSTEM   (+) Asthma      NEUROLOGY   (+) Asperger's disorder   (+) Autism spectrum disorder   (+) Depression   (+) Seizures (HCC)      GASTROINTESTINAL   (+) GERD (gastroesophageal reflux disease)      ENDOCRINE   (+) Severe obesity (HCC)   (+) Type 1 diabetes mellitus complicating pregnancy, antepartum       Anesthetic History     PONV          Review of Systems / Medical History  Patient summary reviewed, nursing notes reviewed and pertinent labs reviewed    Pulmonary          Smoker (smokes Marijuana daily & cigs just occasionally)  Asthma : well controlled       Neuro/Psych     seizures    Psychiatric history    Comments: Bipolar D/o  Asperger's  Manic/ depression    Last seizure 2020 Cardiovascular                  Exercise tolerance: <4 METS     GI/Hepatic/Renal     GERD (has some brealthrough)           Endo/Other    Diabetes: type 1  Hypothyroidism  Morbid obesity and arthritis     Other Findings   Comments: Menorrhagia    On insulin pump; set to exercise mode per Endocrine MD         Physical Exam    Airway  Mallampati: II  TM Distance: < 4 cm  Neck ROM: normal range of motion, short neck   Mouth opening: Normal     Cardiovascular    Rhythm: regular  Rate: normal         Dental  No notable dental hx       Pulmonary  Breath sounds clear to auscultation               Abdominal  GI exam deferred       Other Findings            Anesthetic Plan    ASA: 3  Anesthesia type: general    Monitoring Plan: BIS      Induction: Intravenous  Anesthetic plan and risks discussed with: Patient      Tylenol po preop    Famotidine 20mg IV.

## 2023-01-20 NOTE — PERIOP NOTES
Permission received to review discharge instructions and discuss private health information with  Mercy Kinsey [phone] and sister Charan Dumont. Patient states that family/friend will be with them for at least 24 hours following today's procedure. Warming blanket applied and set to appropriate temp for patient.

## 2023-01-20 NOTE — OP NOTES
Operative Note    Patient ID:   Name: Isrrael Velázquez Record Number: [id number]    YOB: 1995    Preoperative Diagnosis: Menorrhagia with regular cycle [N92.0]    Postoperative Diagnosis: Menorrhagia with regular cycle [N92.0]    Surgeon:  Clifton Kruger MD     Assistant:  CHACORTA Gunn    Anesthesia: General    Procedure: Total Laparoscopic Hysterectomy, bilateral salpingectomy    Findings: normal uterus and tubes, normal ovaries    Estimated Blood Loss: Minimal    Drains: none    Pathology /Specimens:     ID Type Source Tests Collected by Time Destination   1 : Uterus, cervix, bilateral fallopian tubes Preservative Uterus with Bilateral Fallopian Tubes  Hugh Martell MD 1/20/2023 1407 Pathology       DVT Prophylaxis: [facility]    Antibiotic Prophylaxis: Ancef, flagyl    After understanding the risks, benefits, and alternatives to the proposed procedure, the patient was taken to the operating room, where she was administered general anesthesia in the supine position. She was then placed in the dorsal lithotomy position and prepped and draped in usual sterile fashion. A sosa catheter is placed. A sidearm speculum is introduced into the vagina. There cervix is grasped with a single tooth tenaculum. A Jawboneare uterine manipulator is placed and the balloon inflated with air. Gloves are changed and attention is turned to the abdomen. An umbilical incision is made, and access is gained using the optiview technique with a 5 mm trocar. Pneumoperitoneum is obtained and intraabdominal placement is verified. There was no bleeding and no evidence of injury to the bowel. 5 mm incisions were then made in the left and right lower quadrants and 5 mm ports placed in each of those under direct visualization. Findings were noted as above. The articulating EnSeal was used. The ureters were identified on either side.  On the left hand side, the tube and uteroovarian ligament was clamped and divided using the EnSeal. The dissection was then taken down through the round ligament. The anterior peritoneum was incised at the midline. The bladder was dissected off the lower uterine segment and cervix. Posterior peritoneum was taken down, skeletonizing the uterine arteries. The uterine arteries were clamped, coagulated and cut across the ascending branches using EnSeal. Cardinal ligament was developed. The same steps were followed on the right side. The fornices of the vagina are identified via the VCare cup. Colpotomy was made with the Harmonic Hook and carried around the VCare cup. When the specimen was free, it was delivered through the vagina. The bilateral mesosalpinges are divided with the EnSeal to remove both fallopian tubes which are then withdrawn through the trocar. The vaginal cuff was then closed with a running suture of 2-0 Stratafix with care taken to incorporate the angles of the cuff on each side. This stitch is doubled back across the length of the closure. Hemostasis was achieved. The pelvis was irrigated with copious amounts of saline and suctioned away. Hemostasis was assured. The left and right lower trocars were removed under direct visualization. Once the pneumoperitoneum was completely reduced and hemostasis is still effective, the final trocar was removed. Skin of all incisions was closed with 4-0 Monocryl in a subcuticular closure. 0.5% Marcaine with epinephrine is injected at each incision site. Dermabond was applied to the skin. All sponge, lap, needle, and instrument counts were correct times two. Subsequently, the patient was cleaned up, the sosa was removed, and she was returned to the supine position, awakened, and taken to the recovery room in stable condition.      Signed By: Virgie Acevedo MD

## 2023-01-20 NOTE — ANESTHESIA POSTPROCEDURE EVALUATION
Procedure(s):  TOTAL LAPAROSCOPIC HYSTERECTOMY  WITH BILATERAL SALPINGECTOMY.     general    Anesthesia Post Evaluation      Multimodal analgesia: multimodal analgesia used between 6 hours prior to anesthesia start to PACU discharge  Patient location during evaluation: PACU  Patient participation: complete - patient participated  Level of consciousness: awake and alert  Pain management: satisfactory to patient  Airway patency: patent  Anesthetic complications: no  Cardiovascular status: acceptable  Respiratory status: acceptable  Hydration status: acceptable  Post anesthesia nausea and vomiting:  none  Final Post Anesthesia Temperature Assessment:  Normothermia (36.0-37.5 degrees C)      INITIAL Post-op Vital signs:   Vitals Value Taken Time   /75 01/20/23 1530   Temp 36.6 °C (97.9 °F) 01/20/23 1443   Pulse 74 01/20/23 1530   Resp 14 01/20/23 1515   SpO2 99 % 01/20/23 1530

## 2023-01-20 NOTE — PERIOP NOTES
Jacqueline Kendrick  1995  [id number]    Situation:  Verbal report given from: RADU Larson and MAR Urbina RN  Procedure: Procedure(s):  TOTAL LAPAROSCOPIC HYSTERECTOMY  WITH BILATERAL SALPINGECTOMY    Background:    Preoperative diagnosis: Menorrhagia with regular cycle [N92.0]    Postoperative diagnosis: Menorrhagia with regular cycle [N92.0]    :  Dr. Jon Pang    Assistant(s): Circ-1: Albert Cowan RN  Circ-Relief: Jm NIELSON  Scrub Tech-1: Claudette Dad    Specimens:   ID Type Source Tests Collected by Time Destination   1 : Uterus, cervix, bilateral fallopian tubes Preservative Uterus with Bilateral Fallopian Tubes  Amena Sharma MD 1/20/2023 8420 Pathology       Assessment:  Intra-procedure medications         Anesthesia gave intra-procedure sedation and medications, see anesthesia flow sheet     Intravenous fluids: LR@ KVO     Vital signs stable       Recommendation:    Permission to share finding with  : Zachery Wu

## 2023-01-20 NOTE — PERIOP NOTES
1442 Pt received in PACU arousable to name called, asking for , pain 4/10. BS from dexcom 185    1450  Pt sleeping    1458  C/o low abdominal cramping pain 7/10 medicate with fentanyl. 1515 Pain a little better, 6/10 medicate with fentanyl. 1521 Pain 3/10 tolerable    1550 Pain remains 3/10. Pt has 2 hour ride home. Request pain pill. Given oxycodone. 1600 Pain 2/10    1621  Discharge instructions reviewed with [facility]. Verbalize understanding. Ameya Vaz expresses that he just received a call that pt's mother is unexpectedly near death and he wants to know if it is ok for her to go and visit her on the way home. Explained pt just had surgery and to her best interest to go home. Encouraged him to call Dr. Nanci Amezcua and seek guidance as to what to do.       [id number] Pt assist to dress. Ambulate to bathroom. 1 Pt discharged via wheelchair, accompanied by RN. Pt discharged awake and alert, respirations equal and unlabored, skin warm, dry, and intact. Pt and family members' questions and concerns addressed prior to discharge.

## 2023-01-20 NOTE — H&P
Pre-operative Evaluation / History & Physical    Sent From: Sent To: [facility]  [address], [address]  Phone: [phone] Fax: [phone]     Patient Information  Patient Name Sim COLEMAN   1995 Age 27yo  Address [address] [place] [address], [address] [place] [address] Blvd Phone H: [phone]  M: [phone]  [address] [place] [address] (Medicare Replacement/Advantage - HMO)  ID: [id number]  Group: [id number]  Policy Rodrigues: Mariela Quiles, [address] [facility], [facility] [employer] (Medicaid Replacement - HMO)  ID: [id number]  Group: [id number]  Policy Rodrigues: Justin Born    Pre-Op Visit and Medical History  Chief Complaint Pre-Op Exam  History of Present Illness   Virtual Visit  27yo  patient presents today for pre op visit  h/o menorrhagia  Endometrial bx performed on 22: path benign  US normal.  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 Has been on norethindrone but having heavy menstrual period on this and treatment options for HMB are otherwise limited due to her history. The norethindrone has caused weight gain which her endocrinologist would like her to manage more efficiently. 2 prior CS, 1 , has completed childbearing. Diabetic, on novolog pump. Endo is Dr. Fredrick Soares  Has a family history of IUD problems and she is not interested in this. Lysteda likely not a reasonable choice with VTE hx. Not eligible for other medical therapies and is failing norethindrone for her HMB. Planning hysterectomy.   US was normal.  Past Medical History Past Medical History not reviewed (last reviewed 2022)  Blood Clots/DVT/Pulmonary Embolism: Y - in brain? (not a stroke per pt), previously on anticoagulation - none currently  Diabetes Mellitus: Y  Other: N - Autism (Aspergers)  Seizures / Epilepsy: N - when taking tylenol, epi pen or albuterol inhaler  Surgical History Surgical History not reviewed (last reviewed 2022)   section  Ankle arthroscopy/surgery - 2018   section  Gynecological / Obstetrical History Reviewed GYN History  Date of LMP: 10/15/2020 (Notes: unsure of lmp-- no periods with OCPs). Duration of Flow (days): 10. Flow: Heavy. Menses Monthly: Y. Date of Last Pap Smear: 2021 (Notes: NIL). Abnormal Pap: N. Any Treatment for Abnormal Pap?: N.  HPV Vaccine: Y (Notes: completed). Sexually Active?: Y. Sexual Problems?: N.  STIs/STDs: N.  Current Birth Control Method: BCPs. Endometriosis: N. Fibroids: N. Infertility: N. Ovarian Cyst: Y. PCOS: Y.   Social History Social History not reviewed (last reviewed 2022)  Substance Use  Do you or have you ever smoked tobacco?: Never smoker  How much tobacco do you smoke?: None  Do you or have you ever used e-cigarettes or vape?: Former user of electronic cigarettes  Do you or have you ever used smokeless tobacco?: Never used smokeless tobacco  How much tobacco do you chew?: none  What was the date of your most recent tobacco screening?: 2022  What is your level of alcohol consumption?: Occasional  Do you use any illicit or recreational drugs?: Yes  Which illicit or recreational drugs have you used?: cannabis  Education and Occupation  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  Marriage and Sexuality  Are you sexually active?: Yes  How many children do you have?: -3  OB/GYN Social History  How often do you have a DRINK containing ALCOHOL?: Never  Other  Marital status:   Gender Identity and LGBTQ Identity  Sexual orientation: Straight or heterosexual  Family History Family History not reviewed (last reviewed 2022)  Mother - Heart disease    - Hypertensive disorder    - Diabetes mellitus  Brother - Autistic disorder  Daughter - Autistic disorder  Maternal Grandmother - Cerebrovascular accident  Maternal Grandfather - Hypertensive disorder    - Cerebrovascular accident    - Heart disease  Allergies List   Allergies not reviewed (last reviewed 09/26/2022)  BENADRYL   IODINE: Itching   LATEX: Edema (Moderate to severe)   NOVOCAIN   TYLENOL   Medications   Reviewed Medications  busPIRone 10 mg tablet  TAKE 1 TABLET BY MOUTH three times A DAY  09/09/22   filled  Contour Next Test Strips  TEST BLOOD GLUCOSE 6 TIMES DAILY DX CODE: E10.65  07/24/21   filled  Glucagon Emergency Kit 1 mg solution for injection  1 MG BY INTRAMUSCULAR ROUTE AS NEEDED (HYPOGLYCEMIA). 09/09/22   filled  loratadine 10 mg tablet  06/25/20   filled  melatonin 5 mg tablet  TAKE 1-2 TABLETS BY MOUTH AT BEDTIME AS NEEDED  01/19/22   filled  mometasone 50 mcg/actuation nasal spray  08/05/21   filled  norethindrone acetate 5 mg tablet  TAKE 1 TABLET BY MOUTH EVERY DAY  01/09/23   renewed  NovoLOG U-100 Insulin aspart 100 unit/mL subcutaneous solution  USE WITH INSULIN PUMP MAX UNITS DAILY: 100  07/10/22   filled  sertraline 100 mg tablet  TAKE 2 TABLETS BY MOUTH EVERY MORNING  09/09/22   filled  sertraline 50 mg tablet  TAKE 1 TABLET BY ORAL ROUTE 1 TIME PER DAY WITH 200MG DOSE FOR TOTAL DOSE 250MG DAILY. 09/09/22   filled  Review of Systems None recorded  Vital Signs 2023-01-13 09:04  Ht: 5 ft 5 in (165.1 cm)  Physical Exam None recorded  Lab Results   Assessment and Plan   1. Menorrhagia -  Failing norethindrone due to side effects. Otherwise ineligible for medial therapies due to her history. We discussed endometrial ablation and she is not interested in this. Her age suggests that she would likely fail ablation long before menopause and so she would prefer definitive management with hysterectomy. We will plan total laparoscopic hysterectomy. We discussed the risks and benefits of ovarian conservation and the pt would like to have her ovaries left in place as long as they are normal. She understands that oopherectomy would mean immediate menopause.  We also discussed supracervical vs total laparoscopic hyst, and she would like to proceed with the total procedure. She understands that if surgical complications necessitate supracervical hyst, that there is a risk of cyclic menstrual bleeding. I reviewed with the patient indications, alternatives, risks, and benefits of proposed procedure, the risks of which include injury to bowel, bladder, nerves, blood vessels, or ureters, injury to any other intraabdominal structure, risk of bleeding and infection, and inherent risks of anesthesia, including death. The patient indicates understanding of these risks, and agrees to the proposed procedure. She is instructed to stay NPO after midnight the night before surgery. Planning to put her pump in exercise mode during surgery. N92.0: Excessive and frequent menstruation with regular cycle      Return to Office  Robin Machado MD for Surgery at [facility] on 01/20/2023 at 09:30 AM  Robin Machado MD for [place] [address] Virtual Visit at [address] [place] [address] on 02/01/2023 at 12:30 PM  Robin Macahdo MD for Post Op Exam at [facility] [facility] on 03/10/2023 at 09:00 AM  Current Problems (Diagnoses) Reviewed Problems    Electronically Signed by: Eliot Sotomayor MD    _____________________________________________  Ordered/Documented by:  Visit Date: 01/13/2023    The History and Physical is reviewed today. The patient is seen and examined and no changes are required.   Renetta Oliver MD  1/20/2023  11:48 AM

## 2023-01-20 NOTE — DISCHARGE INSTRUCTIONS
After Care Instructions for Your Laparoscopic Hysterectomy      1. When you are discharged from the hospital, you should plan to eat a bland, light diet for the first 1-2 days. Avoid spicy or greasy foods and high roughage foods such as salads and raw vegetables (these can cause gas and bloating). Drink plenty of non-caffeinated fluids (water is best). You may resume your usual diet gradually. 2. Avoid heavy lifting or straining. Gradually increase your activity. First try walking and doing light activity around the house. Most women can return to work 2-3 weeks after the procedure. You should speak with your doctor about your individual return to work plan and any other restrictions. 3. You may take showers. Please do not take baths until you are seen for your post-operative visit. 4. It is not unusual to experience some discomfort under your ribs and/or soreness of your neck and shoulders over the first 1-2 days. This is due to the gas used in your abdomen during the surgery to help your doctor see your pelvic organs. This gas gets naturally reabsorbed. The right shoulder is often more sore than the left. 5. It is not unusual to have vaginal spotting lasting up to 2 weeks off and on. Some women do not experience any bleeding at all. You should call your doctor immediately if you ever experience heavy vaginal bleeding or gushes of any liquid coming from your vagina that does not seem like the amount you would expect for your normal vaginal discharge. 6. Bruises may appear around the incisions and will gradually resolve as they heal.    7. There are several ways that your incisions may be closed. Most of these do not require the removal of any sutures. Some patients may have skin glue, Dermabond, placed over the incisions. Do not try to peel this off, it will gradually wear off on its own. Other patients will have small paper strips placed over the incisions.   Allow these to fall off on their own or your doctor will remove them. No other special dressing is required. 8. Call the office at [phone] to report any of the following problems: abdominal pain that is increasing in severity despite using the prescribed pain medication, heavy vaginal bleeding, drainage or separation of your incision(s), temperature greater than 100.4 or persistent nausea and vomiting. 9. You should be seen in the office following your surgery. Call for an appointment if this has not already been arranged. At this appointment, the findings noted at the time of your procedure and /or pathology reports will be reviewed.    >>>You received Toradol during your surgery. You may not take any form of NSAIDS (non steroidal anti inflammatory drugs) such as Advil, Ibuprofen, Aleve, Motrin until 8:15pm.<<<         TAKE NARCOTIC PAIN MEDICATIONS WITH FOOD     Narcotics tend to be constipating, we suggest taking a stool softener such as Colace or Miralax (follow package instructions). DO NOT DRIVE WHILE TAKING NARCOTIC PAIN MEDICATIONS. DO NOT TAKE SLEEPING MEDICATIONS OR ANTIANXIETY MEDICATIONS WHILE TAKING NARCOTIC PAIN MEDICATIONS,  ESPECIALLY THE NIGHT OF ANESTHESIA! CPAP PATIENTS BE SURE TO WEAR MACHINE WHENEVER NAPPING OR SLEEPING! DISCHARGE SUMMARY from Nurse    The following personal items collected during your admission are returned to you:   Dental Appliance: Dental Appliances: None  Vision: Visual Aid: Glasses, With patient (placed in PACU)  Hearing Aid:    Jewelry: Jewelry: None  Clothing: Clothing: Shirt, Pants, Footwear, With patient  Other Valuables: Other Valuables: Jenelle Punch, Cell Phone, With patient (backpack with clothes)  Valuables sent to safe:        PATIENT INSTRUCTIONS:    After General Anesthesia or Intravenous Sedation, for 24 hours or while taking prescription Narcotics:        Someone should be with you for the next 24 hours.         For your own safety, a responsible adult must drive you home.  Limit your activities  Recommended activity: Rest today, up with assistance today. Do not climb stairs or shower unattended for the next 24 hours. Please start with a soft bland diet and advance as tolerated (no nausea) to regular diet. If you have a sore throat you should try the following: fluids, warm salt water gargles, or throat lozenges. If it does not improve after several days please follow up with your primary physician. Do not drive and operate hazardous machinery  Do not make important personal or business decisions  Do  not drink alcoholic beverages  If you have not urinated within 8 hours after discharge, please contact your surgeon on call. Report the following to your surgeon:  Excessive pain, swelling, redness or odor of or around the surgical area  Temperature over 100.5  Nausea and vomiting lasting longer than 4 hours or if unable to take medications  Any signs of decreased circulation or nerve impairment to extremity: change in color, persistent  numbness, tingling, coldness or increase pain      You will receive a Post Operative Call from one of the Recovery Room Nurses on the day after your surgery to check on you. It is very important for us to know how you are recovering after your surgery. If you have an issue or need to speak with someone, please call your surgeon, do not wait for the post operative call. You may receive an e-mail or letter in the mail from [employer] regarding your experience with us in the Ambulatory Surgery Unit. Your feedback is valuable to us and we appreciate your participation in the survey. If the above instructions are not adequate or you are having problems after your surgery, call the physician at their office number. We wish you a speedy recovery ? What to do at Home:      *  Please give a list of your current medications to your Primary Care Provider.     *  Please update this list whenever your medications are discontinued, doses are      changed, or new medications (including over-the-counter products) are added. *  Please carry medication information at all times in case of emergency situations. If you have not received your influenza and/or pneumococcal vaccine, please follow up with your primary care physician. The discharge information has been reviewed with the patient and caregiver. The patient and caregiver verbalized understanding. TO PREVENT AN INFECTION      [address] YOUR HANDS    To prevent infection, good handwashing is the most important thing you or your caregiver can do. Wash your hands with soap and water or use the hand  we gave you before you touch any wounds. SHOWER    Use the antibacterial soap we gave you when you take a shower. Shower with this soap until your wounds are healed. To reach all areas of your body, you may need someone to help you. Dont forget to clean your belly button with every shower. USE CLEAN SHEETS    Use freshly cleaned sheets on your bed after surgery. To keep the surgery site clean, do not allow pets to sleep with you while your wound is still healing. STOP SMOKING    Stop smoking, or at least cut back on smoking    Smoking slows your healing. CONTROL YOUR BLOOD SUGAR    High blood sugars slow wound healing. If you are diabetic, control your blood sugar levels before and after your surgery.

## 2023-02-17 ENCOUNTER — VIRTUAL VISIT (OUTPATIENT)
Dept: ENDOCRINOLOGY | Age: 28
End: 2023-02-17
Payer: MEDICARE

## 2023-02-17 DIAGNOSIS — E28.2 PCOS (POLYCYSTIC OVARIAN SYNDROME): ICD-10-CM

## 2023-02-17 DIAGNOSIS — E66.01 MORBID OBESITY (HCC): ICD-10-CM

## 2023-02-17 DIAGNOSIS — E10.65 HYPERGLYCEMIA DUE TO TYPE 1 DIABETES MELLITUS (HCC): Primary | ICD-10-CM

## 2023-02-17 RX ORDER — INSULIN ASPART 100 [IU]/ML
INJECTION, SOLUTION INTRAVENOUS; SUBCUTANEOUS
Qty: 90 ML | Refills: 2 | Status: SHIPPED | OUTPATIENT
Start: 2023-02-17

## 2023-02-17 NOTE — PROGRESS NOTES
Jackelin Cuevas MD      Patient Information  Date:2/17/2023  Name : Roger Vera 32 y.o.     YOB: 1995         Referred by: Roger Pool MD       Chief complaint: Type 1 diabetes mellitus    History of Present Illness: Roger Vera is a 32 y.o. female here for follow up     Type 1 diabetes mellitus, on insulin pump  On tandem control IQ,    Hysterectomy 2022, used exercise mode during surgery, no hypoglycemia  No DKA  Getting the supplies consistently now    Prior history  She is using exercise more than forgetting to turn it off, she is on exercise mode 24 hours    Postmeal hyperglycemia, frequent control IQ auto bolus   Was supposed to use exercise more while at work      Carbohydrates are ranging from  g per meal    No chest pain, shortness of breath    She was diagnosed with type 1 diabetes mellitus at age 10. Prior hx    She was seen by Dr. Jose Olivas, pediatric endocrinologist . Nora Dasilva took her baby  and she is here with Diabetic Service Dog. She has seen several endocrinologists . 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 Before she had the Diabetic Service Dog, she had multiple hospitalizations.               Wt Readings from Last 3 Encounters:   01/20/23 231 lb (104.8 kg)   09/16/22 238 lb 8 oz (108.2 kg)   05/16/22 231 lb (104.8 kg)       BP Readings from Last 3 Encounters:   01/20/23 106/62   01/13/23 128/78   09/16/22 129/76           Past Medical History:   Diagnosis Date    Arthritis     Asperger's syndrome     Asthma     Bipolar 1 disorder (HCC)     Chronic pain     Depression     Diabetes ([address]     E-coli UTI     Endocrine disease     diabetes    FHx: allergies     GERD (gastroesophageal reflux disease)     Hx of blood clots 2020    DVT    Manic depression ([address]     Seizure ([address] 2020    as of 12/29/2022 last had it in 2020    Thyroid disease     Vision decreased      Current Outpatient Medications   Medication Sig    OTHER Take 1 Tablet by mouth daily. Birth control - nodesterone    insulin aspart U-100 (NovoLOG U-100 Insulin aspart) 100 unit/mL injection USE WITH INSULIN PUMP MAX UNITS DAILY: 100    ondansetron (ZOFRAN ODT) 4 mg disintegrating tablet Take 1 Tablet by mouth every eight (8) hours as needed for Nausea or Vomiting. ibuprofen (MOTRIN) 800 mg tablet Take one tablet every 8 hours for three days, and then every 8 hours as needed for pain thereafter    sertraline (ZOLOFT) 50 mg tablet TAKE 1 TABLET BY ORAL ROUTE 1 TIME PER DAY WITH 200MG DOSE FOR TOTAL DOSE 250MG DAILY. norethindrone acetate (AYGESTIN) 5 mg tablet Take 5 mg by mouth daily. melatonin 5 mg tablet melatonin 5 mg tablet   TAKE 1-2 TABLETS BY MOUTH AT BEDTIME AS NEEDED    Glucagon Emergency Kit, human, 1 mg solr 1 MG BY INTRAMUSCULAR ROUTE AS NEEDED (HYPOGLYCEMIA). glucagon 1 mg solr 1 mg by IntraMUSCular route as needed (HYPOGLYCEMIA). FLAXSEED PO Take  by mouth daily. glucosamine HCl/chondroitin heath (GLUCOSAMINE-CHONDROITIN PO) Take  by mouth daily. glucagon (GlucaGen HypoKit) 1 mg injection INJECT 1 ML INTRAMUSCULARLY ONCE FOR 1 DOSE.    glucose blood VI test strips (Contour Next Test Strips) strip TEST BLOOD GLUCOSE 6 TIMES DAILY DX CODE: E10.65 (Patient not taking: No sig reported)    busPIRone (BUSPAR) 10 mg tablet Take 10 mg by mouth three (3) times daily. sertraline (ZOLOFT) 100 mg tablet Take 1 Tab by mouth daily. Indications: DEPRESSION (Patient taking differently: Take 250 mg by mouth daily. Indications: depression)     No current facility-administered medications for this visit.      Allergies   Allergen Reactions    Latex Rash    Other Food Itching     Califlower    Banana Itching and Nausea and Vomiting    Benadryl [Diphenhydramine Hcl] Rash    Children's Tylenol [Acetaminophen] Rash    Honey Itching     Itchy tounge    Kiwi Itching and Nausea and Vomiting Lactose Nausea and Vomiting     Only milk is the issue    Other Plant, Animal, Environmental Other (comments)     Dial Soap  - irritates skin    Peach (Prunus Persica) Itching and Nausea and Vomiting     Allergic reactions to raw peaches    Pineapple Itching and Nausea and Vomiting    Procaine Other (comments)    Strawberry Itching and Nausea and Vomiting     Allergies to raw strawberries         Review of Systems:  Per HPI      Physical Examination:   unknown if currently breastfeeding. Estimated body mass index is 40.92 kg/m² as calculated from the following:    Height as of 1/20/23: 5' 3\" (1.6 m). Weight as of 1/20/23: 231 lb (104.8 kg). General: pleasant, no distress, good eye contact  HEENT: no exophthalmos, no periorbital edema, EOMI  Neck: No visible thyromegaly  RS: Normal respiratory effort  Musculoskeletal: no tremors  Neurological: alert and oriented  Psychiatric: normal mood and affect  Skin: Normal color      Data Reviewed:       Lab Results   Component Value Date/Time    Hemoglobin A1c 7.6 (H) 02/06/2023 09:31 AM    Hemoglobin A1c 8.1 (H) 09/02/2022 12:00 AM    Hemoglobin A1c 9.3 (H) 01/10/2022 12:57 PM    Glucose 117 (H) 02/06/2023 09:31 AM    Glucose 290 (H) 12/15/2013 06:00 AM    Glucose (POC) 111 01/20/2023 10:37 AM    Microalbumin/Creat ratio (mg/g creat)  06/09/2021 10:24 AM     Cannot calculate ratio due to microalbumin result outside reportable range.     Microalb/Creat ratio (ug/mg creat.) 5 02/06/2023 09:31 AM    Microalbumin,urine random <0.50 06/09/2021 10:24 AM    LDL,Direct 74 02/06/2023 09:31 AM    LDL, calculated 81 05/03/2016 03:52 PM    Creatinine (POC) 0.8 11/04/2014 03:12 PM    Creatinine 0.64 02/06/2023 09:31 AM      Lab Results   Component Value Date/Time    Cholesterol, total 169 05/03/2016 03:52 PM    HDL Cholesterol 74 05/03/2016 03:52 PM    LDL,Direct 74 02/06/2023 09:31 AM    LDL, calculated 81 05/03/2016 03:52 PM    Triglyceride 68 05/03/2016 03:52 PM    CHOL/HDL Ratio 2.6 12/15/2013 06:00 AM     Lab Results   Component Value Date/Time    ALT (SGPT) 18 01/06/2021 09:31 AM    Alk. phosphatase 153 (H) 01/06/2021 09:31 AM    Bilirubin, total 0.3 01/06/2021 09:31 AM     Lab Results   Component Value Date/Time    GFR est  01/10/2022 12:57 PM    GFR est non- 01/10/2022 12:57 PM    Creatinine (POC) 0.8 11/04/2014 03:12 PM    Creatinine 0.64 02/06/2023 09:31 AM    BUN 18 02/06/2023 09:31 AM    Sodium 142 02/06/2023 09:31 AM    Potassium 4.4 02/06/2023 09:31 AM    Chloride 104 02/06/2023 09:31 AM    CO2 22 02/06/2023 09:31 AM      Lab Results   Component Value Date/Time    TSH 0.37 01/06/2021 09:31 AM    Triiodothyronine (T3), free 2.3 04/02/2013 12:00 AM    T4, Free 1.47 08/02/2019 12:00 AM    TSH, External 0.187 03/29/2019 12:00 AM        Assessment/Plan:     1. Hyperglycemia due to type 1 diabetes mellitus ([address]    2. PCOS (polycystic ovarian syndrome)          1. Type 1 Diabetes Mellitus   Lab Results   Component Value Date/Time    Hemoglobin A1c 7.6 (H) 02/06/2023 09:31 AM    Hemoglobin A1c (POC) 7.4 05/16/2022 04:16 PM     Control improved  Bolus for all calorie intake/meals , wide fluctuation in the blood glucose, limit carbs   tandem control IQ, to use exercise mode only during work    Continuous glucose monitor data downloaded for 2 weeks and reviewed with the patient  36% in target, 59% about target, below target 4%  Noted postprandial hyperglycemia, getting frequent auto correction bolus, carb input once a day. Encouraged to put carbs before each meal    Basal 53%, food bolus and correction bolus 12%, correction control IQ auto bolus 35%. High risk for severe hypoglycemia due to varying meal patterns      In case pump fails switch to daily injections    Lantus 30 units once daily     Novolog carb ratio is 1: 8    Correction factor is 20 , target 120     2 Obesity:There is no height or weight on file to calculate BMI.      3 depression - stable    4. Hysterectomy, no oophorectomy. 2023    Spent > 40 minutes on the day of the visit reviewing chart, examining, ordering/reviewing labs, counseling, discussing therapeutics and documentation in the medical record      There are no Patient Instructions on file for this visit. Follow-up and Dispositions    Return in about 4 months (around 6/17/2023) for labs before next visit and follow up. Thank you for allowing me to participate in the care of this patient.     Kimberly Martinez MD    Patient verbalized understanding

## 2023-06-03 LAB
ALBUMIN SERPL-MCNC: 4.3 G/DL (ref 3.9–5)
ALBUMIN/GLOB SERPL: 1.8 {RATIO} (ref 1.2–2.2)
ALP SERPL-CCNC: 135 IU/L (ref 44–121)
ALT SERPL-CCNC: 18 IU/L (ref 0–32)
AST SERPL-CCNC: 16 IU/L (ref 0–40)
BILIRUB SERPL-MCNC: 0.2 MG/DL (ref 0–1.2)
BUN SERPL-MCNC: 14 MG/DL (ref 6–20)
BUN/CREAT SERPL: 21 (ref 9–23)
CALCIUM SERPL-MCNC: 9 MG/DL (ref 8.7–10.2)
CHLORIDE SERPL-SCNC: 100 MMOL/L (ref 96–106)
CHOLEST SERPL-MCNC: 174 MG/DL (ref 100–199)
CO2 SERPL-SCNC: 26 MMOL/L (ref 20–29)
CREAT SERPL-MCNC: 0.66 MG/DL (ref 0.57–1)
EGFRCR SERPLBLD CKD-EPI 2021: 122 ML/MIN/1.73
EST. AVERAGE GLUCOSE BLD GHB EST-MCNC: 166 MG/DL
GLOBULIN SER CALC-MCNC: 2.4 G/DL (ref 1.5–4.5)
GLUCOSE SERPL-MCNC: 340 MG/DL (ref 70–99)
HBA1C MFR BLD: 7.4 % (ref 4.8–5.6)
HDLC SERPL-MCNC: 67 MG/DL
LDLC SERPL CALC-MCNC: 92 MG/DL (ref 0–99)
POTASSIUM SERPL-SCNC: 4.5 MMOL/L (ref 3.5–5.2)
PROT SERPL-MCNC: 6.7 G/DL (ref 6–8.5)
SODIUM SERPL-SCNC: 138 MMOL/L (ref 134–144)
TRIGL SERPL-MCNC: 82 MG/DL (ref 0–149)
VLDLC SERPL CALC-MCNC: 15 MG/DL (ref 5–40)

## 2023-06-04 LAB
ALBUMIN/CREAT UR: 4 MG/G CREAT (ref 0–29)
CREAT UR-MCNC: 109.1 MG/DL
IMP & REVIEW OF LAB RESULTS: NORMAL
MICROALBUMIN UR-MCNC: 4.8 UG/ML

## 2023-09-09 LAB
ALBUMIN/CREAT UR: <3 MG/G CREAT (ref 0–29)
BUN SERPL-MCNC: 9 MG/DL (ref 6–20)
BUN/CREAT SERPL: 15 (ref 9–23)
CALCIUM SERPL-MCNC: 8.8 MG/DL (ref 8.7–10.2)
CHLORIDE SERPL-SCNC: 104 MMOL/L (ref 96–106)
CHOLEST SERPL-MCNC: 160 MG/DL (ref 100–199)
CO2 SERPL-SCNC: 25 MMOL/L (ref 20–29)
CREAT SERPL-MCNC: 0.6 MG/DL (ref 0.57–1)
CREAT UR-MCNC: 102.5 MG/DL
EGFRCR SERPLBLD CKD-EPI 2021: 125 ML/MIN/1.73
GLUCOSE SERPL-MCNC: 153 MG/DL (ref 70–99)
HBA1C MFR BLD: 7.3 % (ref 4.8–5.6)
HDLC SERPL-MCNC: 58 MG/DL
LDLC SERPL CALC-MCNC: 90 MG/DL (ref 0–99)
MICROALBUMIN UR-MCNC: <3 UG/ML
POTASSIUM SERPL-SCNC: 4.5 MMOL/L (ref 3.5–5.2)
SODIUM SERPL-SCNC: 143 MMOL/L (ref 134–144)
T4 FREE SERPL-MCNC: 1.01 NG/DL (ref 0.82–1.77)
TRIGL SERPL-MCNC: 59 MG/DL (ref 0–149)
TSH SERPL DL<=0.005 MIU/L-ACNC: 0.62 UIU/ML (ref 0.45–4.5)
VLDLC SERPL CALC-MCNC: 12 MG/DL (ref 5–40)

## 2023-09-10 LAB — IMP & REVIEW OF LAB RESULTS: NORMAL

## 2023-09-22 ENCOUNTER — OFFICE VISIT (OUTPATIENT)
Age: 28
End: 2023-09-22

## 2023-09-22 VITALS
SYSTOLIC BLOOD PRESSURE: 103 MMHG | HEART RATE: 89 BPM | BODY MASS INDEX: 41.57 KG/M2 | WEIGHT: 234.6 LBS | TEMPERATURE: 97.6 F | RESPIRATION RATE: 18 BRPM | DIASTOLIC BLOOD PRESSURE: 65 MMHG | HEIGHT: 63 IN

## 2023-09-22 DIAGNOSIS — E10.65 TYPE 1 DIABETES MELLITUS WITH HYPERGLYCEMIA (HCC): Primary | ICD-10-CM

## 2023-09-22 DIAGNOSIS — R53.82 CHRONIC FATIGUE: ICD-10-CM

## 2023-09-22 DIAGNOSIS — E66.01 MORBID OBESITY (HCC): ICD-10-CM

## 2023-09-22 RX ORDER — FAMOTIDINE 10 MG
10 TABLET ORAL DAILY
COMMUNITY

## 2023-09-22 RX ORDER — PROCHLORPERAZINE 25 MG/1
SUPPOSITORY RECTAL
COMMUNITY

## 2023-09-22 NOTE — PROGRESS NOTES
Adelina Blackwell MD         Patient Information   Date:2/17/2023   Name : Wilda Bal 32 y.o.       YOB: 1995           Referred by: Abisai Walter MD         Chief Complaint   Patient presents with    Diabetes           History of Present Illness: Wilda Bal is a 32 y.o. female here  for follow up       Type 1 diabetes mellitus was diagnosed at age 10, on insulin pump   On tandem control IQ,     Hysterectomy 2022, used exercise mode during surgery, no hypoglycemia  No DKA  Getting the supplies consistently now  Working at [employer], few episodes of hypoglycemia while working, uses exercise mode then  Delay in the bolus for meals        Prior history   She is using exercise more than forgetting to turn it off, she is on exercise mode 24 hours                 Physical Examination:      General: pleasant, no distress, good eye contact  HEENT: no exophthalmos, no periorbital edema, EOMI  Neck: No visible thyromegaly   RS: Normal respiratory effort  Musculoskeletal: no tremors  Neurological: alert and oriented  Psychiatric: normal mood and affect  Skin: Normal color         Data Reviewed:             Lab Results   Component Value Date    GFRAA 146 01/10/2022        Lab Results   Component Value Date    LABA1C 7.3 (H) 09/08/2023    LABA1C 7.4 (H) 06/03/2023    LABA1C 7.6 (H) 02/06/2023         Lab Results   Component Value Date    LDLCALC 90 09/08/2023    TRIG 59 09/08/2023    HDL 58 09/08/2023     09/08/2023    K 4.5 09/08/2023     09/08/2023    CO2 25 09/08/2023    BUN 9 09/08/2023    CREATININE 0.60 09/08/2023    GFRAA 146 01/10/2022    GLUCOSE 153 (H) 09/08/2023    CALCIUM 8.8 09/08/2023    MALBCR <3 09/08/2023              Assessment/Plan:           1.  Type 1 Diabetes Mellitus      Lab Results   Component Value Date    LABA1C 7.3 (H) 09/08/2023     Controlled   Bolus 15 minutes before for all calorie intake/meals , wide

## 2023-09-22 NOTE — PROGRESS NOTES
Irma Ruelas is a 29 y.o. adult here for   Chief Complaint   Patient presents with    Diabetes       1. Have you been to the ER, urgent care clinic since your last visit? Hospitalized since your last visit? - no     2. Have you seen or consulted any other health care providers outside of the [address] [place] [address] Avenue since your last visit?   Include any pap smears or colon screening.- PCP

## 2023-09-22 NOTE — PATIENT INSTRUCTIONS
In case pump fails switch to daily injections      Lantus 30 units once daily       Novolog carb ratio is 1: 8      Correction factor is 20 , target 120 Orbicularis Oris Muscle Flap Text: The defect edges were debeveled with a #15 scalpel blade.  Given that the defect affected the competency of the oral sphincter an orbicularis oris muscle flap was deemed most appropriate to restore this competency and normal muscle function.  Using a sterile surgical marker, an appropriate flap was drawn incorporating the defect. The area thus outlined was incised with a #15 scalpel blade.

## 2023-12-06 DIAGNOSIS — E10.65 TYPE 1 DIABETES MELLITUS WITH HYPERGLYCEMIA (HCC): ICD-10-CM

## 2023-12-06 DIAGNOSIS — R53.82 CHRONIC FATIGUE: ICD-10-CM

## 2023-12-16 LAB
BUN SERPL-MCNC: 13 MG/DL (ref 6–20)
BUN/CREAT SERPL: 19 (ref 9–23)
CALCIUM SERPL-MCNC: 8.9 MG/DL (ref 8.7–10.2)
CHLORIDE SERPL-SCNC: 104 MMOL/L (ref 96–106)
CO2 SERPL-SCNC: 21 MMOL/L (ref 20–29)
CREAT SERPL-MCNC: 0.69 MG/DL (ref 0.57–1)
EGFRCR SERPLBLD CKD-EPI 2021: 121 ML/MIN/1.73
GLUCOSE SERPL-MCNC: 137 MG/DL (ref 70–99)
HBA1C MFR BLD: 7.1 % (ref 4.8–5.6)
POTASSIUM SERPL-SCNC: 4.3 MMOL/L (ref 3.5–5.2)
SODIUM SERPL-SCNC: 142 MMOL/L (ref 134–144)

## 2024-01-05 ENCOUNTER — TELEMEDICINE (OUTPATIENT)
Age: 29
End: 2024-01-05

## 2024-01-05 DIAGNOSIS — E10.65 TYPE 1 DIABETES MELLITUS WITH HYPERGLYCEMIA (HCC): Primary | ICD-10-CM

## 2024-01-05 DIAGNOSIS — E66.01 MORBID OBESITY (HCC): ICD-10-CM

## 2024-01-05 NOTE — PROGRESS NOTES
[facility] DIABETES AND ENDOCRINOLOGY                 Juliet Liu MD         Patient Information   Date:2/17/2023   Name : Kailee Hood 27 y.o.       YOB: 1995           Referred by: Anisa Koch MD    Kailee Hood  was evaluated through a synchronous (real-time) audio-video encounter. The patient (or guardian if applicable) is aware that this is a billable service, which includes applicable co-pays. This Virtual Visit was conducted with patient's (and/or legal guardian's) consent. Patient identification was verified, and a caregiver was present when appropriate.   The patient was located at Home:   Provider was located at Facility ([facility]): [address] [place] [id number]         Chief Complaint   Patient presents with    Diabetes           History of Present Illness: Kailee Hood is a 27 y.o. female here  for follow up       Type 1 diabetes mellitus was diagnosed at age 6, on insulin pump   On tandem control IQ,     Hysterectomy 2022, used exercise mode during surgery, no hypoglycemia  No DKA  Getting the supplies consistently now  Working at [employer], had 1 episode of severe hypoglycemia requiring glucagon injection, no precipitating cause known  No infection      Prior history   She is using exercise more then forgetting to turn it off, she was on exercise mode 24 hours                 Physical Examination:    General: pleasant, no distress, good eye contact  HEENT: no exophthalmos, no periorbital edema, EOMI  Neck: No visible thyromegaly  RS: Normal respiratory effort  Musculoskeletal: no tremors  Neurological: alert and oriented  Psychiatric: normal mood and affect  Skin: Normal color      Data Reviewed:             Lab Results   Component Value Date    GFRAA 146 01/10/2022        Lab Results   Component Value Date    LABA1C 7.1 (H) 12/15/2023    LABA1C 7.3 (H) 09/08/2023    LABA1C 7.4 (H) 06/03/2023         Lab Results   Component Value Date    LDLCALC 90

## 2024-02-29 ENCOUNTER — TELEPHONE (OUTPATIENT)
Age: 29
End: 2024-02-29

## 2024-02-29 NOTE — TELEPHONE ENCOUNTER
Trisha Freitas called looking for Certificate of Medical Necessity for DME supplies fir [employer] cartridge. If you have questions you can call her at [phone], ext [phone].

## 2024-04-15 ENCOUNTER — OFFICE VISIT (OUTPATIENT)
Age: 29
End: 2024-04-15
Payer: MEDICARE

## 2024-04-15 VITALS
DIASTOLIC BLOOD PRESSURE: 61 MMHG | TEMPERATURE: 97.9 F | BODY MASS INDEX: 41.46 KG/M2 | WEIGHT: 234 LBS | HEIGHT: 63 IN | HEART RATE: 80 BPM | SYSTOLIC BLOOD PRESSURE: 95 MMHG | RESPIRATION RATE: 18 BRPM | OXYGEN SATURATION: 99 %

## 2024-04-15 DIAGNOSIS — E28.2 POLYCYSTIC OVARIAN SYNDROME: ICD-10-CM

## 2024-04-15 DIAGNOSIS — E78.5 DYSLIPIDEMIA: ICD-10-CM

## 2024-04-15 DIAGNOSIS — E10.65 HYPERGLYCEMIA DUE TO TYPE 1 DIABETES MELLITUS (HCC): Primary | ICD-10-CM

## 2024-04-15 PROCEDURE — 99215 OFFICE O/P EST HI 40 MIN: CPT | Performed by: INTERNAL MEDICINE

## 2024-04-15 PROCEDURE — 95251 CONT GLUC MNTR ANALYSIS I&R: CPT | Performed by: INTERNAL MEDICINE

## 2024-04-15 PROCEDURE — 3051F HG A1C>EQUAL 7.0%<8.0%: CPT | Performed by: INTERNAL MEDICINE

## 2024-04-15 RX ORDER — VORTIOXETINE 5 MG/1
TABLET, FILM COATED ORAL
COMMUNITY

## 2024-04-15 RX ORDER — ROSUVASTATIN CALCIUM 10 MG/1
10 TABLET, COATED ORAL NIGHTLY
Qty: 30 TABLET | Refills: 3 | Status: SHIPPED | OUTPATIENT
Start: 2024-04-15

## 2024-04-15 NOTE — PATIENT INSTRUCTIONS
In case pump fails switch to daily injections      Lantus 30 units once daily       Novolog carb ratio is 1: 8      Correction factor is 20 , target 120

## 2024-04-15 NOTE — PROGRESS NOTES
fails switch to daily injections      Lantus 30 units once daily       Novolog carb ratio is 1: 8      Correction factor is 20 , target 120       2 Obesity:There is no height or weight on file to calculate BMI.         3 Depression - stable      4.  Hysterectomy, no oophorectomy.  2023       5.  Dyslipidemia: Statin, has had diabetes for more than 20 years, for prevention    Spent > 40 minutes on the day of the visit reviewing chart, examining, ordering/reviewing labs, counseling, discussing therapeutics and documentation in the medical record         Thank you for allowing me to participate in the care of this patient.      Juliet Liu MD      Patient verbalized understanding

## 2024-07-03 PROBLEM — E78.5 DYSLIPIDEMIA: Status: ACTIVE | Noted: 2024-07-03

## 2024-07-18 LAB
ALBUMIN/CREAT UR: 3 MG/G CREAT (ref 0–29)
BUN SERPL-MCNC: 9 MG/DL
BUN/CREAT SERPL: 15
CALCIUM SERPL-MCNC: 9.2 MG/DL
CHLORIDE SERPL-SCNC: 103 MMOL/L (ref 96–106)
CHOLEST SERPL-MCNC: 132 MG/DL
CO2 SERPL-SCNC: 27 MMOL/L (ref 20–29)
CREAT SERPL-MCNC: 0.59 MG/DL
CREAT UR-MCNC: 129.5 MG/DL
EGFRCR SERPLBLD CKD-EPI 2021: ABNORMAL ML/MIN/1.73
GLUCOSE SERPL-MCNC: 180 MG/DL (ref 70–99)
HBA1C MFR BLD HPLC: 7.5 %
HBA1C MFR BLD: 7.5 % (ref 4.8–5.6)
HDLC SERPL-MCNC: 69 MG/DL
LDLC SERPL CALC-MCNC: 52 MG/DL
LDLC/HDLC SERPL: 0.8 RATIO
MICROALBUMIN UR-MCNC: 4.1 UG/ML
POTASSIUM SERPL-SCNC: 4.8 MMOL/L (ref 3.5–5.2)
SODIUM SERPL-SCNC: 143 MMOL/L (ref 134–144)
TRIGL SERPL-MCNC: 50 MG/DL
VLDLC SERPL CALC-MCNC: 11 MG/DL (ref 5–40)

## 2024-07-23 ENCOUNTER — OFFICE VISIT (OUTPATIENT)
Age: 29
End: 2024-07-23
Payer: MEDICARE

## 2024-07-23 VITALS
DIASTOLIC BLOOD PRESSURE: 62 MMHG | WEIGHT: 236 LBS | OXYGEN SATURATION: 98 % | HEIGHT: 63 IN | BODY MASS INDEX: 41.82 KG/M2 | RESPIRATION RATE: 18 BRPM | TEMPERATURE: 98.1 F | SYSTOLIC BLOOD PRESSURE: 111 MMHG | HEART RATE: 63 BPM

## 2024-07-23 DIAGNOSIS — E28.2 PCOS (POLYCYSTIC OVARIAN SYNDROME): ICD-10-CM

## 2024-07-23 DIAGNOSIS — E66.01 MORBID OBESITY (HCC): ICD-10-CM

## 2024-07-23 DIAGNOSIS — E78.5 DYSLIPIDEMIA: ICD-10-CM

## 2024-07-23 DIAGNOSIS — E10.65 HYPERGLYCEMIA DUE TO TYPE 1 DIABETES MELLITUS (HCC): Primary | ICD-10-CM

## 2024-07-23 PROCEDURE — 3051F HG A1C>EQUAL 7.0%<8.0%: CPT | Performed by: INTERNAL MEDICINE

## 2024-07-23 PROCEDURE — 99215 OFFICE O/P EST HI 40 MIN: CPT | Performed by: INTERNAL MEDICINE

## 2024-07-23 PROCEDURE — 95251 CONT GLUC MNTR ANALYSIS I&R: CPT | Performed by: INTERNAL MEDICINE

## 2024-07-23 RX ORDER — PHENTERMINE HYDROCHLORIDE 37.5 MG/1
37.5 TABLET ORAL
Qty: 30 TABLET | Refills: 3 | Status: SHIPPED | OUTPATIENT
Start: 2024-07-23 | End: 2024-08-22

## 2024-07-23 NOTE — PATIENT INSTRUCTIONS
Phenterine/ Qsymia     Please watch out for chest pain, heart racing, dizziness, or anxiety and let me know if you develop any of these symptoms.  Please monitor your blood pressure 1-2 times a week while on this and let me know if you are having any readings over 140/90.     You can not be pregnant or get pregnant while you take the medication.If there is any possibility of pregnancy, a pregnancy test should be done to rule out before starting medication.

## 2024-07-23 NOTE — PROGRESS NOTES
[facility] DIABETES AND ENDOCRINOLOGY                 Juliet Liu MD           Name : Kailee JOHNSON Erwin ERICODavidB : 1995           Referred by: Anisa Koch MD        Chief Complaint   Patient presents with    Diabetes           History of Present Illness: Kailee Hood is here  for follow up       Type 1 diabetes mellitus was diagnosed at age 6, on insulin pump   On tandem control IQ,     Hysterectomy 2022, used exercise mode during surgery, no hypoglycemia  No DKA  Working on diet, exercise, no weight loss  On antidepressant medications,  Getting the supplies consistently now    Had labs recently  No severe hypoglycemia,      Wt Readings from Last 3 Encounters:   07/23/24 107 kg (236 lb)   04/15/24 106.1 kg (234 lb)   09/22/23 106.4 kg (234 lb 9.6 oz)        Past Medical History:   Diagnosis Date    Arthritis     Asperger's syndrome     Asthma     Bipolar 1 disorder (HCC)     Chronic pain     Depression     Diabetes (HCC)     E-coli UTI     Endocrine disease     diabetes    FHx: allergies     GERD (gastroesophageal reflux disease)     Hx of blood clots 2020    DVT    Manic depression (HCC)     Seizure (HCC) 2020    as of 12/29/2022 last had it in 2020    Thyroid disease     Vision decreased        Current Outpatient Medications   Medication Sig    TRINTELLIX 5 MG tablet TAKE 1 TABLET BY MOUTH ONCE DAILY AT BEDTIME FOR MOOD AND THOUGHT. DISCONTINUE TRINTILLEX    rosuvastatin (CRESTOR) 10 MG tablet Take 1 tablet by mouth nightly    NOVOLOG 100 UNIT/ML injection vial USE WITH INSULIN PUMP MAX UNITS DAILY: 100    famotidine (PEPCID) 10 MG tablet Take 1 tablet by mouth daily    Continuous Blood Gluc Sensor (DEXCOM G6 SENSOR) MISC by Does not apply route    loratadine (CLARITIN) 10 MG capsule Take 1 capsule by mouth daily    Misc Natural Products (GLUCOSAMINE CHONDROITIN ADV PO) Take by mouth    sertraline (ZOLOFT) 50 MG tablet TAKE 1 TABLET BY ORAL ROUTE 1 TIME PER DAY WITH 200MG DOSE FOR TOTAL DOSE

## 2024-07-24 DIAGNOSIS — E10.65 HYPERGLYCEMIA DUE TO TYPE 1 DIABETES MELLITUS (HCC): Primary | ICD-10-CM

## 2024-08-22 DIAGNOSIS — E78.5 DYSLIPIDEMIA: ICD-10-CM

## 2024-08-22 DIAGNOSIS — E10.65 HYPERGLYCEMIA DUE TO TYPE 1 DIABETES MELLITUS (HCC): ICD-10-CM

## 2024-08-23 DIAGNOSIS — E10.65 TYPE 1 DIABETES MELLITUS WITH HYPERGLYCEMIA (HCC): ICD-10-CM

## 2024-08-23 RX ORDER — INSULIN ASPART 100 [IU]/ML
INJECTION, SOLUTION INTRAVENOUS; SUBCUTANEOUS
Qty: 90 ML | Refills: 3 | Status: SHIPPED | OUTPATIENT
Start: 2024-08-23

## 2024-08-23 RX ORDER — ROSUVASTATIN CALCIUM 10 MG/1
10 TABLET, COATED ORAL NIGHTLY
Qty: 90 TABLET | Refills: 3 | Status: SHIPPED | OUTPATIENT
Start: 2024-08-23

## 2024-10-02 LAB
ALBUMIN/CREAT UR: 6 MG/G CREAT (ref 0–29)
BUN SERPL-MCNC: 7 MG/DL
BUN/CREAT SERPL: 12
CALCIUM SERPL-MCNC: 8.9 MG/DL
CHLORIDE SERPL-SCNC: 104 MMOL/L (ref 96–106)
CO2 SERPL-SCNC: 26 MMOL/L (ref 20–29)
CREAT SERPL-MCNC: 0.58 MG/DL
CREAT UR-MCNC: 160.4 MG/DL
EGFRCR SERPLBLD CKD-EPI 2021: ABNORMAL ML/MIN/1.73
GLUCOSE SERPL-MCNC: 176 MG/DL (ref 70–99)
HBA1C MFR BLD: 7.3 % (ref 4.8–5.6)
LDLC SERPL DIRECT ASSAY-MCNC: 40 MG/DL
MICROALBUMIN UR-MCNC: 10.4 UG/ML
POTASSIUM SERPL-SCNC: 4.7 MMOL/L (ref 3.5–5.2)
SODIUM SERPL-SCNC: 142 MMOL/L (ref 134–144)

## 2024-10-18 ENCOUNTER — OFFICE VISIT (OUTPATIENT)
Age: 29
End: 2024-10-18

## 2024-10-18 VITALS
TEMPERATURE: 97.8 F | HEIGHT: 63 IN | SYSTOLIC BLOOD PRESSURE: 110 MMHG | WEIGHT: 225 LBS | BODY MASS INDEX: 39.87 KG/M2 | OXYGEN SATURATION: 98 % | DIASTOLIC BLOOD PRESSURE: 73 MMHG | RESPIRATION RATE: 16 BRPM | HEART RATE: 97 BPM

## 2024-10-18 DIAGNOSIS — E10.65 TYPE 1 DIABETES MELLITUS WITH HYPERGLYCEMIA (HCC): Primary | ICD-10-CM

## 2024-10-18 DIAGNOSIS — E28.2 PCOS (POLYCYSTIC OVARIAN SYNDROME): ICD-10-CM

## 2024-10-18 DIAGNOSIS — E78.5 DYSLIPIDEMIA: ICD-10-CM

## 2024-10-18 DIAGNOSIS — E66.01 MORBID OBESITY: ICD-10-CM

## 2024-10-18 RX ORDER — PHENTERMINE HYDROCHLORIDE 37.5 MG/1
37.5 TABLET ORAL
COMMUNITY
Start: 2024-09-20

## 2024-10-18 NOTE — PROGRESS NOTES
[facility] DIABETES AND ENDOCRINOLOGY                 Juliet Liu MD           Name : Kailee Hood    D.ODavidB : 1995           Referred by: Anisa Koch MD        Chief Complaint   Patient presents with    Diabetes           History of Present Illness: Kailee Hood is here  for follow up       Type 1 diabetes mellitus was diagnosed at age 6, on insulin pump   On tandem control IQ,     Hysterectomy 2022, used exercise mode during surgery, no hypoglycemia    She recently acquired a new insulin pump due to the malfunction of her previous one, which was no longer charging. She successfully transferred the settings from her old pump to the new one. Her daily basal insulin dose is 24 units. She reports that her appetite has decreased significantly, particularly in the mornings. She reports no episodes of diabetic ketoacidosis, infections, or severe hypoglycemia.    She had an eye examination on Monday, during which she was informed that her prescription had slightly changed due to her eyes adjusting.    She has been taking Adipex as prescribed, which has resulted in weight loss. She reports no heart palpitations. She notes that the medication has improved her focus and reduced her appetite.              Wt Readings from Last 3 Encounters:   10/18/24 102.1 kg (225 lb)   07/23/24 107 kg (236 lb)   04/15/24 106.1 kg (234 lb)        Past Medical History:   Diagnosis Date    Arthritis     Asperger's syndrome     Asthma     Bipolar 1 disorder (HCC)     Chronic pain     Depression     Diabetes (HCC)     E-coli UTI     Endocrine disease     diabetes    FHx: allergies     GERD (gastroesophageal reflux disease)     Hx of blood clots 2020    DVT    Manic depression (HCC)     Seizure (HCC) 2020    as of 12/29/2022 last had it in 2020    Thyroid disease     Vision decreased        Current Outpatient Medications   Medication Sig    phentermine (ADIPEX-P) 37.5 MG tablet Take 1 tablet by mouth every morning

## 2024-11-22 DIAGNOSIS — E66.01 MORBID (SEVERE) OBESITY DUE TO EXCESS CALORIES: ICD-10-CM

## 2024-11-22 DIAGNOSIS — E66.01 MORBID OBESITY: ICD-10-CM

## 2024-11-22 DIAGNOSIS — E10.65 TYPE 1 DIABETES MELLITUS WITH HYPERGLYCEMIA (HCC): Primary | ICD-10-CM

## 2024-11-22 RX ORDER — PHENTERMINE HYDROCHLORIDE 37.5 MG/1
TABLET ORAL
Qty: 90 TABLET | Refills: 3 | Status: SHIPPED | OUTPATIENT
Start: 2024-11-22 | End: 2025-05-22

## 2025-01-08 LAB — HBA1C MFR BLD HPLC: 7.4 %

## 2025-01-21 ENCOUNTER — OFFICE VISIT (OUTPATIENT)
Age: 30
End: 2025-01-21
Payer: MEDICARE

## 2025-01-21 VITALS
SYSTOLIC BLOOD PRESSURE: 112 MMHG | RESPIRATION RATE: 18 BRPM | DIASTOLIC BLOOD PRESSURE: 62 MMHG | HEART RATE: 89 BPM | HEIGHT: 63 IN | TEMPERATURE: 97.3 F | OXYGEN SATURATION: 100 % | WEIGHT: 214 LBS | BODY MASS INDEX: 37.92 KG/M2

## 2025-01-21 DIAGNOSIS — E78.5 DYSLIPIDEMIA: ICD-10-CM

## 2025-01-21 DIAGNOSIS — E10.65 HYPERGLYCEMIA DUE TO TYPE 1 DIABETES MELLITUS (HCC): Primary | ICD-10-CM

## 2025-01-21 DIAGNOSIS — E66.9 NON MORBID OBESITY: ICD-10-CM

## 2025-01-21 PROCEDURE — 99215 OFFICE O/P EST HI 40 MIN: CPT | Performed by: INTERNAL MEDICINE

## 2025-01-21 NOTE — PROGRESS NOTES
[facility] DIABETES AND ENDOCRINOLOGY                 Juliet Liu MD           Name : Kailee Hood    D.ODavidB : 1995           Referred by: Anisa Koch MD        Chief Complaint   Patient presents with    Diabetes           History of Present Illness: Kailee Hood is here  for follow up       Type 1 diabetes mellitus was diagnosed at age 6, on insulin pump   On tandem control IQ,    Hospital visit for flu, no DKA  No severe hypoglycemia  Lost weight  Tolerating phentermine  Working full-time  On tandem control IQ      She has been taking Adipex as prescribed, which has resulted in weight loss.               Wt Readings from Last 3 Encounters:   01/21/25 97.1 kg (214 lb)   10/18/24 102.1 kg (225 lb)   07/23/24 107 kg (236 lb)        Past Medical History:   Diagnosis Date    Arthritis     Asperger's syndrome     Asthma     Bipolar 1 disorder (HCC)     Chronic pain     Depression     Diabetes (HCC)     E-coli UTI     Endocrine disease     diabetes    FHx: allergies     GERD (gastroesophageal reflux disease)     Hx of blood clots 2020    DVT    Manic depression (HCC)     Seizure (HCC) 2020    as of 12/29/2022 last had it in 2020    Thyroid disease     Vision decreased        Current Outpatient Medications   Medication Sig    phentermine (ADIPEX-P) 37.5 MG tablet TAKE 1 TABLET BY MOUTH IN THE MORNING BEFORE  BREAKFAST  MAX  DAILY  DOSE  OF  1  TABLET    rosuvastatin (CRESTOR) 10 MG tablet TAKE 1 TABLET BY MOUTH EVERY DAY AT NIGHT    NOVOLOG 100 UNIT/ML injection vial USE WITH INSULIN PUMP MAX UNITS DAILY: 100    glucagon 1 MG injection 1 MG BY INTRAMUSCULAR ROUTE AS NEEDED (HYPOGLYCEMIA).    VORTIoxetine (TRINTELLIX) 10 MG TABS tablet Take 1 tablet by mouth nightly    famotidine (PEPCID) 10 MG tablet Take 1 tablet by mouth daily    Continuous Blood Gluc Sensor (DEXCOM G6 SENSOR) MISC by Does not apply route    loratadine (CLARITIN) 10 MG capsule Take 1 capsule by mouth daily    Misc Natural

## 2025-01-21 NOTE — PROGRESS NOTES
Kailee Hood is a 29 y.o. adult here for   Chief Complaint   Patient presents with    Diabetes       1. Have you been to the ER, urgent care clinic since your last visit?  Hospitalized since your last visit? -no    2. Have you seen or consulted any other health care providers outside of the [facility] since your last visit?  Include any pap smears or colon screening.-no

## 2025-04-03 ENCOUNTER — TELEPHONE (OUTPATIENT)
Age: 30
End: 2025-04-03

## 2025-04-03 DIAGNOSIS — E10.65 HYPERGLYCEMIA DUE TO TYPE 1 DIABETES MELLITUS (HCC): Primary | ICD-10-CM

## 2025-04-03 NOTE — TELEPHONE ENCOUNTER
[facility] still waiting to receive information to fill sensors, patient called has no more and no back up meter or supplies. Please call. Thank you

## 2025-04-04 NOTE — TELEPHONE ENCOUNTER
Form received from [employer], completed and faxed back as urgent.   Pt made aware. Asked pt which pharmacy she would like back up meter and supplies sent to. She stated she's already picked up a meter and does not need one at this time. No further questions or concerns at this time.

## 2025-04-04 NOTE — TELEPHONE ENCOUNTER
Attempted to call [facility]. Had to leave VM. Asked them to contact pt and update her in ref to supplies. If something is needed on our end asked them to contact/fax us as nothing has been received recently on pt. Phone and fax number was left.

## 2025-05-06 LAB
ALBUMIN URINE, EXTERNAL: 0.2
CREATININE, URINE, EXTERNAL: 117
HBA1C MFR BLD HPLC: 7.2 %
LDL CHOLESTEROL, EXTERNAL: 46
MICROALBUMIN/CREAT UR: 2 MG/G{CREAT}

## 2025-05-15 ENCOUNTER — OFFICE VISIT (OUTPATIENT)
Age: 30
End: 2025-05-15

## 2025-05-15 VITALS
DIASTOLIC BLOOD PRESSURE: 65 MMHG | BODY MASS INDEX: 35.44 KG/M2 | OXYGEN SATURATION: 100 % | WEIGHT: 200 LBS | SYSTOLIC BLOOD PRESSURE: 103 MMHG | HEIGHT: 63 IN | HEART RATE: 93 BPM | TEMPERATURE: 97.8 F | RESPIRATION RATE: 18 BRPM

## 2025-05-15 DIAGNOSIS — E78.5 DYSLIPIDEMIA: ICD-10-CM

## 2025-05-15 DIAGNOSIS — E10.65 HYPERGLYCEMIA DUE TO TYPE 1 DIABETES MELLITUS (HCC): Primary | ICD-10-CM

## 2025-05-15 DIAGNOSIS — E66.9 NON MORBID OBESITY: ICD-10-CM

## 2025-05-15 NOTE — PROGRESS NOTES
[facility] DIABETES AND ENDOCRINOLOGY                 Juliet Liu MD           Name : Kailee Castillodeja ERICODavidB : 1995           Referred by: Anisa Koch MD        Chief Complaint   Patient presents with    Diabetes           History of Present Illness: Kailee Hood is here  for follow up       Type 1 diabetes mellitus was diagnosed at age 6, on insulin pump   On tandem control IQ,    Hospital visit for flu, no DKA  No severe hypoglycemia  Lost weight  Tolerating phentermine  Working full-time  On tandem control IQ  No acute issues      Has difficulty getting supplies from [place]                 Wt Readings from Last 3 Encounters:   05/15/25 90.7 kg (200 lb)   01/21/25 97.1 kg (214 lb)   10/18/24 102.1 kg (225 lb)        Past Medical History:   Diagnosis Date    Arthritis     Asperger's syndrome     Asthma     Bipolar 1 disorder (HCC)     Chronic pain     Depression     Diabetes (HCC)     E-coli UTI     Endocrine disease     diabetes    FHx: allergies     GERD (gastroesophageal reflux disease)     Hx of blood clots 2020    DVT    Manic depression (HCC)     Seizure (HCC) 2020    as of 12/29/2022 last had it in 2020    Thyroid disease     Vision decreased        Current Outpatient Medications   Medication Sig    phentermine (ADIPEX-P) 37.5 MG tablet TAKE 1 TABLET BY MOUTH IN THE MORNING BEFORE  BREAKFAST  MAX  DAILY  DOSE  OF  1  TABLET    rosuvastatin (CRESTOR) 10 MG tablet TAKE 1 TABLET BY MOUTH EVERY DAY AT NIGHT    NOVOLOG 100 UNIT/ML injection vial USE WITH INSULIN PUMP MAX UNITS DAILY: 100    glucagon 1 MG injection 1 MG BY INTRAMUSCULAR ROUTE AS NEEDED (HYPOGLYCEMIA).    VORTIoxetine (TRINTELLIX) 10 MG TABS tablet Take 1 tablet by mouth nightly    famotidine (PEPCID) 10 MG tablet Take 1 tablet by mouth daily    Continuous Blood Gluc Sensor (DEXCOM G6 SENSOR) MISC by Does not apply route    loratadine (CLARITIN) 10 MG capsule Take 1 capsule by mouth daily    Misc Natural Products

## 2025-05-15 NOTE — PROGRESS NOTES
Kailee Hood is a 30 y.o. adult here for   Chief Complaint   Patient presents with    Diabetes       1. Have you been to the ER, urgent care clinic since your last visit?  Hospitalized since your last visit? -no    2. Have you seen or consulted any other health care providers outside of the [facility] since your last visit?  Include any pap smears or colon screening.-psych

## 2025-05-16 DIAGNOSIS — E10.65 HYPERGLYCEMIA DUE TO TYPE 1 DIABETES MELLITUS (HCC): Primary | ICD-10-CM

## 2025-05-16 RX ORDER — GLUCAGON INJECTION, SOLUTION 1 MG/.2ML
1 INJECTION, SOLUTION SUBCUTANEOUS AS NEEDED
Qty: 0.2 ML | Refills: 3 | Status: SHIPPED | OUTPATIENT
Start: 2025-05-16

## 2025-05-23 DIAGNOSIS — E66.01 MORBID (SEVERE) OBESITY DUE TO EXCESS CALORIES (HCC): ICD-10-CM

## 2025-05-23 DIAGNOSIS — E66.01 MORBID OBESITY (HCC): ICD-10-CM

## 2025-05-27 RX ORDER — PHENTERMINE HYDROCHLORIDE 37.5 MG/1
37.5 TABLET ORAL
Qty: 30 TABLET | Refills: 5 | Status: SHIPPED | OUTPATIENT
Start: 2025-05-27 | End: 2025-06-26

## 2025-05-27 RX ORDER — PHENTERMINE HYDROCHLORIDE 37.5 MG/1
TABLET ORAL
Qty: 30 TABLET | Refills: 11 | OUTPATIENT
Start: 2025-05-27 | End: 2025-11-24

## 2025-05-29 DIAGNOSIS — E78.5 DYSLIPIDEMIA: ICD-10-CM

## 2025-05-29 DIAGNOSIS — E10.65 HYPERGLYCEMIA DUE TO TYPE 1 DIABETES MELLITUS (HCC): ICD-10-CM

## 2025-08-15 DIAGNOSIS — E78.5 DYSLIPIDEMIA: ICD-10-CM

## 2025-08-15 DIAGNOSIS — E10.65 TYPE 1 DIABETES MELLITUS WITH HYPERGLYCEMIA (HCC): ICD-10-CM

## 2025-08-15 DIAGNOSIS — E10.65 HYPERGLYCEMIA DUE TO TYPE 1 DIABETES MELLITUS (HCC): ICD-10-CM

## 2025-08-15 RX ORDER — INSULIN ASPART 100 [IU]/ML
INJECTION, SOLUTION INTRAVENOUS; SUBCUTANEOUS
Qty: 90 ML | Refills: 3 | Status: SHIPPED | OUTPATIENT
Start: 2025-08-15

## 2025-08-15 RX ORDER — ROSUVASTATIN CALCIUM 10 MG/1
10 TABLET, COATED ORAL NIGHTLY
Qty: 90 TABLET | Refills: 3 | Status: SHIPPED | OUTPATIENT
Start: 2025-08-15

## 2025-08-18 ENCOUNTER — OFFICE VISIT (OUTPATIENT)
Age: 30
End: 2025-08-18
Payer: MEDICARE

## 2025-08-18 VITALS
OXYGEN SATURATION: 98 % | WEIGHT: 207.5 LBS | BODY MASS INDEX: 36.77 KG/M2 | HEIGHT: 63 IN | SYSTOLIC BLOOD PRESSURE: 112 MMHG | TEMPERATURE: 98.2 F | DIASTOLIC BLOOD PRESSURE: 71 MMHG | HEART RATE: 98 BPM

## 2025-08-18 DIAGNOSIS — E10.65 HYPERGLYCEMIA DUE TO TYPE 1 DIABETES MELLITUS (HCC): Primary | ICD-10-CM

## 2025-08-18 DIAGNOSIS — E66.9 OBESITY (BMI 30-39.9): ICD-10-CM

## 2025-08-18 PROCEDURE — 99214 OFFICE O/P EST MOD 30 MIN: CPT | Performed by: INTERNAL MEDICINE

## 2025-08-18 PROCEDURE — 3051F HG A1C>EQUAL 7.0%<8.0%: CPT | Performed by: INTERNAL MEDICINE

## 2025-08-18 PROCEDURE — 95251 CONT GLUC MNTR ANALYSIS I&R: CPT | Performed by: INTERNAL MEDICINE

## 2025-08-18 RX ORDER — TIZANIDINE 2 MG/1
2 TABLET ORAL 2 TIMES DAILY PRN
COMMUNITY
Start: 2025-06-26

## 2025-08-18 RX ORDER — PHENTERMINE HYDROCHLORIDE 37.5 MG/1
37.5 TABLET ORAL
COMMUNITY
Start: 2025-07-26

## (undated) DEVICE — AIRSEAL 5 MM ACCESS PORT AND LOW PROFILE OBTURATOR WITH BLADELESS OPTICAL TIP, 100 MM LENGTH: Brand: AIRSEAL

## (undated) DEVICE — TRI-LUMEN FILTERED TUBE SET WITH ACTIVATED CHARCOAL FILTER: Brand: AIRSEAL

## (undated) DEVICE — HYPODERMIC SAFETY NEEDLE: Brand: MONOJECT

## (undated) DEVICE — Device

## (undated) DEVICE — VISUALIZATION SYSTEM: Brand: CLEARIFY

## (undated) DEVICE — GYN LAPAROSCOPY-MRMC: Brand: MEDLINE INDUSTRIES, INC.

## (undated) DEVICE — COVER LT HNDL PLAS RIG 1 PER PK

## (undated) DEVICE — VCARE MEDIUM, UTERINE MANIPULATOR, VAGINAL-CERVICAL-AHLUWALIA'S-RETRACTOR-ELEVATOR: Brand: VCARE

## (undated) DEVICE — TROCAR: Brand: KII SLEEVE

## (undated) DEVICE — SOLUTION IRRIG 1000ML STRL H2O USP PLAS POUR BTL

## (undated) DEVICE — PAD BD MATTRESS 73X32 IN STD CONVOLUTED FOAM LTX FREE

## (undated) DEVICE — ADPT HND SWCH HARM SCALP DISP --

## (undated) DEVICE — HOOK ENDOSCP L4-9CM TIP FOR SEAL AND DISECT HARM

## (undated) DEVICE — SEALER TISS L35CM DIA5MM CRV JAW ARTC ADV BPLR ENSEAL G2

## (undated) DEVICE — ADHESIVE SKIN CLSR 0.7ML TOP DERMBND ADV

## (undated) DEVICE — SUTURE MCRYL SZ 4-0 L27IN ABSRB UD L19MM PS-2 1/2 CIR PRIM Y426H

## (undated) DEVICE — GLOVE ORANGE PI 8   MSG9080

## (undated) DEVICE — ELECTRO LUBE IS A SINGLE PATIENT USE DEVICE THAT IS INTENDED TO BE USED ON ELECTROSURGICAL ELECTRODES TO REDUCE STICKING.: Brand: KEY SURGICAL ELECTRO LUBE

## (undated) DEVICE — TROCAR: Brand: KII FIOS FIRST ENTRY

## (undated) DEVICE — SUTURE STRATAFIX SPRL PDS + SZ 2-0 L9IN ABSRB VLT CT-1 SXPP1B456